# Patient Record
Sex: FEMALE | Race: OTHER | Employment: UNEMPLOYED | URBAN - METROPOLITAN AREA
[De-identification: names, ages, dates, MRNs, and addresses within clinical notes are randomized per-mention and may not be internally consistent; named-entity substitution may affect disease eponyms.]

---

## 2017-01-23 RX ORDER — METOPROLOL TARTRATE 25 MG/1
TABLET, FILM COATED ORAL
Qty: 60 TAB | Refills: 1 | Status: SHIPPED | OUTPATIENT
Start: 2017-01-23 | End: 2017-01-25 | Stop reason: SDUPTHER

## 2017-01-25 ENCOUNTER — OFFICE VISIT (OUTPATIENT)
Dept: FAMILY MEDICINE CLINIC | Age: 52
End: 2017-01-25

## 2017-01-25 VITALS
BODY MASS INDEX: 23.49 KG/M2 | RESPIRATION RATE: 26 BRPM | WEIGHT: 155 LBS | SYSTOLIC BLOOD PRESSURE: 163 MMHG | DIASTOLIC BLOOD PRESSURE: 99 MMHG | HEART RATE: 76 BPM | TEMPERATURE: 98.8 F | HEIGHT: 68 IN | OXYGEN SATURATION: 98 %

## 2017-01-25 DIAGNOSIS — Z12.11 SCREEN FOR COLON CANCER: ICD-10-CM

## 2017-01-25 DIAGNOSIS — R45.89 DEPRESSED AFFECT: ICD-10-CM

## 2017-01-25 DIAGNOSIS — Z23 NEED FOR TDAP VACCINATION: ICD-10-CM

## 2017-01-25 DIAGNOSIS — Z00.00 HEALTHCARE MAINTENANCE: Primary | ICD-10-CM

## 2017-01-25 DIAGNOSIS — I10 ESSENTIAL HYPERTENSION: ICD-10-CM

## 2017-01-25 DIAGNOSIS — J47.1 BRONCHIECTASIS WITH ACUTE EXACERBATION (HCC): ICD-10-CM

## 2017-01-25 DIAGNOSIS — Z23 ENCOUNTER FOR IMMUNIZATION: ICD-10-CM

## 2017-01-25 DIAGNOSIS — R05.9 COUGH: ICD-10-CM

## 2017-01-25 LAB
ALBUMIN UR QL STRIP: 30 MG/L
CREATININE, URINE POC: 200 MG/DL
MICROALBUMIN/CREAT RATIO POC: <30 MG/G

## 2017-01-25 RX ORDER — FLUOXETINE 10 MG/1
10 CAPSULE ORAL DAILY
Qty: 30 CAP | Refills: 11 | Status: SHIPPED | OUTPATIENT
Start: 2017-01-25 | End: 2021-06-29

## 2017-01-25 RX ORDER — METOPROLOL TARTRATE 25 MG/1
TABLET, FILM COATED ORAL
Qty: 60 TAB | Refills: 11 | Status: SHIPPED | OUTPATIENT
Start: 2017-01-25 | End: 2021-06-29

## 2017-01-25 RX ORDER — AZITHROMYCIN 250 MG/1
TABLET, FILM COATED ORAL
Qty: 6 TAB | Refills: 1 | Status: SHIPPED | OUTPATIENT
Start: 2017-01-25 | End: 2017-01-30

## 2017-01-25 RX ORDER — LOSARTAN POTASSIUM 100 MG/1
100 TABLET ORAL DAILY
Qty: 30 TAB | Refills: 11 | Status: SHIPPED | OUTPATIENT
Start: 2017-01-25 | End: 2021-06-29

## 2017-01-25 NOTE — PROGRESS NOTES
Justus Garcia is a 46 y.o. female      Issues discussed today include:        Signs and symptoms:  Cough producing yellow sputum  Duration: one week  Context:  +exposures  Location:  Head and chest  Quality:  congestion  Severity:  Preventing rest  Timing:  now  Modifying factors:  No fevers      Data reviewed or ordered today:      WELLNESS     GYN:  Dr. Shayan Luis, she sees 2017  Mammogram:  2017  LMP:  now  last pap:      Hearing screen:   done  Vision screening:   done  Depression screening:   Done, PHQ 9 = 9, Rx given today  Fall assessment:   done    Colonoscopy:  needs  FOBT:  2017  TDAP:  2008, 2017  Pneumovax:  2017  PCV-13:  Consider 2018  Flu shot:  declined  Eye exam:  Consider   EK    FTF for DME:  done:  none  Advanced directive:  Full code  Specialists:  Mark Lai  CKD Peppiatt  GI Sarah  pulmahin Kenny  ENT Mehdi Miller    In general, I advise patients to be as active as possible. I believe exercise is the key to long life and good health. The current recommendation is for individuals to exercise for 150 minutes each week (in other words 30 minutes 5 days a week). Exercise should be vigorous enough to work up a sweat. These activities include brisk walking, running, tennis, swimming, weight-lifting, etc.     I usually tell folks that work is work and exercise is exercise. Each of these activities has a different goal.  Even though you may be active at work, it may not be aerobically adequate. So build dedicated exercise time into your weekly routine. If a patient drinks alcohol, I suggest that a male drink only 2 beers (or glasses of wine, or shots of liquor) in any 24 hour period ( and not daily). For females, the limits are one drink per 24 hours (and not daily). After these limits, the toxic effects of alcohol consumption start to manifest.     Avoid tobacco products.   I may provide separate information discussing specific smoking cessation instructions if needed. I suggest a wellness exam yearly during your birth month to update health maintenance issues such as fasting labs, EKGs and other studies, appropriate cancer screenings,  immunizations, etc.      I suggest a yearly flu shot    Please call Leon Ross to help arrange and authorize any tests or referrals. 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 30 Cap 11    metoprolol tartrate (LOPRESSOR) 25 mg tablet TAKE 1 TABLET BY MOUTH TWICE A DAY 60 Tab 11    losartan (COZAAR) 100 mg tablet Take 1 Tab by mouth daily. 30 Tab 11    azithromycin (ZITHROMAX) 250 mg tablet Take 2 tablets today, then take 1 tablet daily 6 Tab 1    ascorbic acid (VITAMIN C) 500 mg tablet Take  by mouth.  fluticasone (FLONASE) 50 mcg/actuation nasal spray 2 puffs each nostril daily 1 Bottle 11    ASMANEX TWISTHALER 220 mcg (120 doses) aepb inhaler INHALE 2 PUFFS BY MOUTH TWICE DAILY 1 Inhaler 11    fexofenadine (ALLEGRA) 180 mg tablet Take  by mouth daily.  aspirin delayed-release 81 mg tablet Take  by mouth daily.  CALCIUM CARBONATE/VITAMIN D2 (CALCIUM + VITAMIN D PO) Take  by mouth.  potassium chloride (K-DUR, KLOR-CON) 10 mEq tablet Take 1 Tab by mouth daily. 30 Tab 3    FOLIC ACID/MULTIVIT-MIN/LUTEIN (CENTRUM SILVER PO) Take  by mouth. Allergies: Allergies   Allergen Reactions    Codeine Rash and Itching       LMP:  No LMP recorded.     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 Physical Exam  Visit Vitals    BP (!) 163/99    Pulse 76    Temp 98.8 °F (37.1 °C) (Oral)    Resp 26    Ht 5' 8\" (1.727 m)    Wt 155 lb (70.3 kg)    SpO2 98%    BMI 23.57 kg/m2     BP Readings from Last 3 Encounters:   01/25/17 (!) 163/99   04/07/16 178/87   03/20/16 (!) 143/93     Constitutional:  Appears well,  No Acute Distress, Vitals noted  Psychiatric:   Affect normal, Alert and cooperative, Oriented to person/place/time    Eyes:   Pupils equally round and reactive, EOMI, conjunctiva clear, eyelids normal  ENT:   External ears and nose normal/lips, teeth=OK/gums normal, TMs and Orophyarynx normal  Neck:   general inspection and Thyroid normal.  No abnormal cervical or supraclavicular nodes    Lungs:   Scattered rhonchi to auscultation, good respiratory effort  Heart: Ausculation normal.  Regular rhythm. No cardiac murmurs.   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 Healthcare maintenance Z00.00 V70.0    2. Depressed affect R45.89 311 FLUoxetine (PROZAC) 10 mg capsule      TSH 3RD GENERATION   3. Essential hypertension I10 401.9 metoprolol tartrate (LOPRESSOR) 25 mg tablet      losartan (COZAAR) 100 mg tablet      CHOLESTEROL, HDL      CHOLESTEROL, TOTAL      LDL, DIRECT      CBC W/O DIFF      METABOLIC PANEL, COMPREHENSIVE      AMB POC URINE, MICROALBUMIN, SEMIQUANT (3 RESULTS)    not optimal, ajdust Rx   4. Bronchiectasis with acute exacerbation (HCC) J47.1 494.1 azithromycin (ZITHROMAX) 250 mg tablet   5. Need for Tdap vaccination Z23 V06.1 TETANUS, DIPHTHERIA TOXOIDS AND ACELLULAR PERTUSSIS VACCINE (TDAP), IN INDIVIDS. >=7, IM   6. Encounter for immunization Z23 V03.89 PNEUMOCOCCAL POLYSACCHARIDE VACCINE, 23-VALENT, ADULT OR IMMUNOSUPPRESSED PT DOSE,   7. Screen for colon cancer Z12.11 V76.51 REFERRAL TO GASTROENTEROLOGY      AMB POC FECAL BLOOD, OCCULT, QL 3 CARDS       Plan:  Orders Placed This Encounter    PNEUMOCOCCAL POLYSACCHARIDE VACCINE, 23-VALENT, ADULT OR IMMUNOSUPPRESSED PT DOSE,    TETANUS, DIPHTHERIA TOXOIDS AND ACELLULAR PERTUSSIS VACCINE (TDAP), IN INDIVIDS. >=7, IM    CHOLESTEROL, HDL    CHOLESTEROL, TOTAL    LDL, DIRECT    TSH 3RD GENERATION    CBC W/O DIFF    METABOLIC PANEL, COMPREHENSIVE    REFERRAL TO GASTROENTEROLOGY     Referral Priority:   Routine     Referral Type:   Consultation     Referral Reason:   Specialty Services Required     Referral Location:   [facility]     Referred to Provider:   Lisset Dia MD    AMB POC URINE, MICROALBUMIN, SEMIQUANT (3 RESULTS)    AMB FECAL OCCULT BLOOD QL-3 CARDS    FLUoxetine (PROZAC) 10 mg capsule     Sig: Take 1 Cap by mouth daily.      Dispense:  30 Cap     Refill:  11    metoprolol tartrate (LOPRESSOR) 25 mg tablet     Sig: TAKE 1 TABLET BY MOUTH TWICE A DAY     Dispense:  60 Tab     Refill:  11    losartan (COZAAR) 100 mg tablet     Sig: Take 1 Tab by mouth daily. Dispense:  30 Tab     Refill:  11    azithromycin (ZITHROMAX) 250 mg tablet     Sig: Take 2 tablets today, then take 1 tablet daily     Dispense:  6 Tab     Refill:  1       See patient instructions  Patient Instructions   Start fluoxetine 10 mg daily    Follow up in 2 weeks    Continue other medicines    Take zithromax with food    Use mucinex for cough    Colonoscopy Dr Gabriela Fair #[phone]    Please call Shelly Roach to help arrange and authorize any tests and/or referrals.   Her # [phone]     Follow up for GYN exam and mammogram soon          refresh note:  done    AVS Printed:  done

## 2017-01-25 NOTE — PATIENT INSTRUCTIONS
Start fluoxetine 10 mg daily    Follow up in 2 weeks    Continue other medicines    Take zithromax with food    Use mucinex for cough    Colonoscopy Dr Bette Brush #[phone]    Please call Rosita Gaucher to help arrange and authorize any tests and/or referrals.   Her # is [phone]     Follow up for GYN exam and mammogram soon    Follow up with your specialists

## 2017-01-25 NOTE — MR AVS SNAPSHOT
Visit Information Date & Time Provider Department Dept. Phone Encounter #  
 1/25/2017  1:00 PM Kacey Galindo MD [facility] [phone] [id number] Upcoming Health Maintenance Date Due FOBT Q 1 YEAR AGE 50-75 1/25/2018 BREAST CANCER SCRN MAMMOGRAM 1/25/2019 PAP AKA CERVICAL CYTOLOGY 1/25/2020 DTaP/Tdap/Td series (2 - Td) 1/25/2027 Allergies as of 1/25/2017  Review Complete On: 1/25/2017 By: Kacey Galindo MD  
  
 Severity Noted Reaction Type Reactions Codeine High 04/06/2010    Rash, Itching Current Immunizations  Reviewed on 1/25/2017 Name Date Influenza Vaccine 9/25/2014, 11/18/2013 Influenza Vaccine Whole 10/17/2008 Pneumococcal Polysaccharide (PPSV-23)  Incomplete Pneumococcal Vaccine (Unspecified Type) 11/2/2006 TD Vaccine 10/17/2008 Tdap  Incomplete Reviewed by Niharika Osborne on 1/25/2017 at  1:06 PM  
 Reviewed by Niharika Osborne on 1/25/2017 at  1:06 PM  
You Were Diagnosed With   
  
 Codes Comments Healthcare maintenance    -  Primary ICD-10-CM: Z00.00 ICD-9-CM: V70.0 Depressed affect     ICD-10-CM: R45.89 ICD-9-CM: 879 Essential hypertension     ICD-10-CM: I10 
ICD-9-CM: 401.9 not optimal, ajdust Rx [facility]asis with acute exacerbation [facility]     ICD-10-CM: J47.1 ICD-9-CM: 494.1 Need for Tdap vaccination     ICD-10-CM: F40 ICD-9-CM: V06.1 Encounter for immunization     ICD-10-CM: G25 ICD-9-CM: V03.89 Screen for colon cancer     ICD-10-CM: Z12.11 ICD-9-CM: V76.51 Vitals BP Pulse Temp Resp Height(growth percentile) Weight(growth percentile) (!) 163/99 76 98.8 °F (37.1 °C) (Oral) 26 5' 8\" (1.727 m) 155 lb (70.3 kg) SpO2 BMI OB Status Smoking Status 98% 23.57 kg/m2 Having regular periods Never Smoker BMI and BSA Data Body Mass Index Body Surface Area  
 23.57 kg/m 2 1.84 m 2 Preferred Pharmacy Pharmacy Name Phone [facility]/PHARMACY #[id number], [address] ([place] [address]) [phone] Your Updated Medication List  
  
   
This list is accurate as of: 1/25/17  1:57 PM.  Always use your most recent med list.  
  
  
  
  
 ascorbic acid (vitamin C) 500 mg tablet Commonly known as:  VITAMIN C Take  by mouth. ASMANEX TWISTHALER 220 mcg (120 doses) Aepb inhaler Generic drug:  mometasone INHALE 2 PUFFS BY MOUTH TWICE DAILY  
  
 aspirin delayed-release 81 mg tablet Take  by mouth daily. azithromycin 250 mg tablet Commonly known as:  Eward Bert Take 2 tablets today, then take 1 tablet daily CALCIUM + VITAMIN D PO Take  by mouth. CENTRUM SILVER PO Take  by mouth. fexofenadine 180 mg tablet Commonly known as:  Chary Record Take  by mouth daily. FLUoxetine 10 mg capsule Commonly known as:  PROzac Take 1 Cap by mouth daily. fluticasone 50 mcg/actuation nasal spray Commonly known as:  Byron Sacks 2 puffs each nostril daily  
  
 losartan 100 mg tablet Commonly known as:  COZAAR Take 1 Tab by mouth daily. metoprolol tartrate 25 mg tablet Commonly known as:  LOPRESSOR  
TAKE 1 TABLET BY MOUTH TWICE A DAY  
  
 potassium chloride 10 mEq tablet Commonly known as:  K-DUR, KLOR-CON Take 1 Tab by mouth daily. Prescriptions Sent to Pharmacy Refills FLUoxetine (PROZAC) 10 mg capsule 11 Sig: Take 1 Cap by mouth daily. Class: Normal  
 Pharmacy: [facility]/pharmacy #[id number]- [id number] [facility], [address] [facility] [address] ([place] [address]) Ph #: [phone] Route: Oral  
 metoprolol tartrate (LOPRESSOR) 25 mg tablet 11 Sig: TAKE 1 TABLET BY MOUTH TWICE A DAY Class: Normal  
 Pharmacy: [facility] Ph #: [phone]  
 losartan (COZAAR) 100 mg tablet 11 Sig: Take 1 Tab by mouth daily. Class: Normal  
 Pharmacy: [facility]/pharmacy #[id number]- [address], [address] ([place] [address]) Ph #: [phone] Route: Oral  
 azithromycin (ZITHROMAX) 250 mg tablet 1 Sig: Take 2 tablets today, then take 1 tablet daily Class: Normal  
 Pharmacy: [facility]/pharmacy #[id number]- [address], [address] ([place] [address]) Ph #: [phone] We Performed the Following AMB POC FECAL BLOOD, OCCULT, QL 3 CARDS [06568 CPT(R)] AMB POC URINE, MICROALBUMIN, SEMIQUANT (3 RESULTS) [63481 CPT(R)] CBC W/O DIFF [47095 CPT(R)] CHOLESTEROL, HDL [id number] CPT(R)] CHOLESTEROL, TOTAL [28051 CPT(R)] LDL, DIRECT [id number] CPT(R)] METABOLIC PANEL, COMPREHENSIVE [75544 CPT(R)] PNEUMOCOCCAL POLYSACCHARIDE VACCINE, 23-VALENT, ADULT OR IMMUNOSUPPRESSED PT DOSE, [42673 CPT(R)] REFERRAL TO GASTROENTEROLOGY [EFO96 Custom] Comments:  
 Please evaluate patient for colonoscopy. TETANUS, DIPHTHERIA TOXOIDS AND ACELLULAR PERTUSSIS VACCINE (TDAP), IN INDIVIDS. >=7, IM [id number] CPT(R)] TSH 3RD GENERATION [64405 CPT(R)] Referral Information Referral ID Referred By Referred To  
  
 [id number] [facility], [address] [place] [address]  [address] [place] [address] Visits Status Start Date End Date 1 New Request 1/25/17 1/25/18 If your referral has a status of pending review or denied, additional information will be sent to support the outcome of this decision. Patient Instructions Start fluoxetine 10 mg daily Follow up in 2 weeks Continue other medicines Take zithromax with food Use mucinex for cough Colonoscopy Dr Saldana See #[phone] Please call Sumanth Lomeli to help arrange and authorize any tests and/or referrals. Her # is [phone] Follow up for GYN exam and mammogram soon Introducing [facility]! Shaylee Vazquez introduces [employer] patient portal. Now you can access parts of your medical record, email your doctor's office, and request medication refills online. 1. In your internet browser, go to [email or web address] 2. Click on the First Time User? Click Here link in the Sign In box. You will see the New Member Sign Up page. 3. Enter your [employer] Access Code exactly as it appears below. You will not need to use this code after youve completed the sign-up process. If you do not sign up before the expiration date, you must request a new code. · [employer] Access Code: [email or web address] Expires: 4/25/2017  1:57 PM 
 
4. Enter the last four digits of your Social Security Number (xxxx) and Date of Birth (mm/dd/yyyy) as indicated and click Submit. You will be taken to the next sign-up page. 5. Create a [employer] ID. This will be your [employer] login ID and cannot be changed, so think of one that is secure and easy to remember. 6. Create a [employer] password. You can change your password at any time. 7. Enter your Password Reset Question and Answer. This can be used at a later time if you forget your password. 8. Enter your e-mail address. You will receive e-mail notification when new information is available in [address] 9. Click Sign Up. You can now view and download portions of your medical record. 10. Click the Download Summary menu link to download a portable copy of your medical information. If you have questions, please visit the Frequently Asked Questions section of the [employer] website. Remember, [employer] is NOT to be used for urgent needs. For medical emergencies, dial 911. Now available from your iPhone and Android! Please provide this summary of care documentation to your next provider. Your primary care clinician is listed as Tripp Lentz. If you have any questions after today's visit, please call [phone].

## 2017-01-25 NOTE — PROGRESS NOTES
Chief Complaint   Patient presents with    Complete Physical     is not fasting.  Nasal Congestion     sinus congestion for about a week. feels very sick. Declined flu shot. Reviewed record in preparation for visit and have obtained necessary documentation. 1. Have you been to the ER, urgent care clinic since your last visit? Hospitalized since your last visit? No    2. Have you seen or consulted any other health care providers outside of the [address] [place] [address] since your last visit? Include any pap smears or colon screening.  No

## 2017-01-25 NOTE — LETTER
1/26/2017 12:34 PM 
 
Ms. Princess Patel [address] [address] Dear Princess Patel: 
 
Please find your most recent results below. Resulted Orders CHOLESTEROL, HDL Result Value Ref Range HDL Cholesterol 65 >39 mg/dL Narrative Performed at:  [address]  [id number] : Maryann Hanson MD, Phone:  [phone] CHOLESTEROL, TOTAL Result Value Ref Range Cholesterol, total 159 100 - 199 mg/dL Narrative Performed at:  [address]  [id number] : Maryann Hanson MD, Phone:  [phone] LDL, DIRECT Result Value Ref Range LDL,Direct 79 0 - 99 mg/dL Narrative Performed at:  [address]  [id number] : Maryann Hanson MD, Phone:  [phone] TSH 3RD GENERATION Result Value Ref Range TSH 2.950 0.450 - 4.500 uIU/mL Narrative Performed at:  [address]  [id number] : Maryann Hanson MD, Phone:  [phone] CBC W/O DIFF Result Value Ref Range WBC 8.2 3.4 - 10.8 x10E3/uL  
 RBC 4.69 3.77 - 5.28 x10E6/uL HGB 14.0 11.1 - 15.9 g/dL HCT 41.6 34.0 - 46.6 % MCV 89 79 - 97 fL  
 MCH 29.9 26.6 - 33.0 pg  
 MCHC 33.7 31.5 - 35.7 g/dL  
 RDW 14.1 12.3 - 15.4 % PLATELET 110 475 - 208 x10E3/uL Narrative Performed at:  [address]  [id number] : Maryann Hanson MD, Phone:  [phone] METABOLIC PANEL, COMPREHENSIVE Result Value Ref Range Glucose 69 65 - 99 mg/dL BUN 9 6 - 24 mg/dL Creatinine 0.53 (L) 0.57 - 1.00 mg/dL GFR est non- >59 mL/min/1.73 GFR est  >59 mL/min/1.73  
 BUN/Creatinine ratio 17 9 - 23 Sodium 146 (H) 134 - 144 mmol/L Potassium 3.3 (L) 3.5 - 5.2 mmol/L  Chloride 102 96 - 106 mmol/L  
 CO2 25 18 - 29 mmol/L Calcium 9.6 8.7 - 10.2 mg/dL Protein, total 7.6 6.0 - 8.5 g/dL Albumin 4.3 3.5 - 5.5 g/dL GLOBULIN, TOTAL 3.3 1.5 - 4.5 g/dL A-G Ratio 1.3 1.1 - 2.5 Bilirubin, total 1.2 0.0 - 1.2 mg/dL Alk. phosphatase 79 39 - 117 IU/L  
 AST 24 0 - 40 IU/L  
 ALT 23 0 - 32 IU/L Narrative Performed at:  [address]  [id number] : Micih Gandara MD, Phone:  [phone] AMB POC URINE, MICROALBUMIN, SEMIQUANT (3 RESULTS) Result Value Ref Range ALBUMIN, URINE POC 30 Negative mg/L  
 CREATININE, URINE  mg/dL Microalbumin/creat ratio (POC) <30 mg/g Comment:  
   Normal  
CVD REPORT Result Value Ref Range INTERPRETATION Note Comment:  
   Supplement report is available. Narrative Performed at:  [facility] [place] [address] [place] [address]  [id number] : Consuelo Patel PhD, Phone:  [phone] RECOMMENDATIONS: 
 
Your potassium remains borderline low. Let's recheck your blood pressure when you return in 2 weeks. Sincerely, Orville Casanova MD

## 2017-01-26 LAB
ALBUMIN SERPL-MCNC: 4.3 G/DL (ref 3.5–5.5)
ALBUMIN/GLOB SERPL: 1.3 {RATIO} (ref 1.1–2.5)
ALP SERPL-CCNC: 79 IU/L (ref 39–117)
ALT SERPL-CCNC: 23 IU/L (ref 0–32)
AST SERPL-CCNC: 24 IU/L (ref 0–40)
BILIRUB SERPL-MCNC: 1.2 MG/DL (ref 0–1.2)
BUN SERPL-MCNC: 9 MG/DL (ref 6–24)
BUN/CREAT SERPL: 17 (ref 9–23)
CALCIUM SERPL-MCNC: 9.6 MG/DL (ref 8.7–10.2)
CHLORIDE SERPL-SCNC: 102 MMOL/L (ref 96–106)
CHOLEST SERPL-MCNC: 159 MG/DL (ref 100–199)
CO2 SERPL-SCNC: 25 MMOL/L (ref 18–29)
CREAT SERPL-MCNC: 0.53 MG/DL (ref 0.57–1)
ERYTHROCYTE [DISTWIDTH] IN BLOOD BY AUTOMATED COUNT: 14.1 % (ref 12.3–15.4)
GLOBULIN SER CALC-MCNC: 3.3 G/DL (ref 1.5–4.5)
GLUCOSE SERPL-MCNC: 69 MG/DL (ref 65–99)
HCT VFR BLD AUTO: 41.6 % (ref 34–46.6)
HDLC SERPL-MCNC: 65 MG/DL
HGB BLD-MCNC: 14 G/DL (ref 11.1–15.9)
INTERPRETATION, 910389: NORMAL
LDLC SERPL DIRECT ASSAY-MCNC: 79 MG/DL (ref 0–99)
MCH RBC QN AUTO: 29.9 PG (ref 26.6–33)
MCHC RBC AUTO-ENTMCNC: 33.7 G/DL (ref 31.5–35.7)
MCV RBC AUTO: 89 FL (ref 79–97)
PLATELET # BLD AUTO: 224 X10E3/UL (ref 150–379)
POTASSIUM SERPL-SCNC: 3.3 MMOL/L (ref 3.5–5.2)
PROT SERPL-MCNC: 7.6 G/DL (ref 6–8.5)
RBC # BLD AUTO: 4.69 X10E6/UL (ref 3.77–5.28)
SODIUM SERPL-SCNC: 146 MMOL/L (ref 134–144)
TSH SERPL DL<=0.005 MIU/L-ACNC: 2.95 UIU/ML (ref 0.45–4.5)
WBC # BLD AUTO: 8.2 X10E3/UL (ref 3.4–10.8)

## 2017-01-26 NOTE — PROGRESS NOTES
Your potassium remains borderline low. Let's recheck your blood pressure when you return in 2 weeks.

## 2017-02-16 ENCOUNTER — TELEPHONE (OUTPATIENT)
Dept: FAMILY MEDICINE CLINIC | Age: 52
End: 2017-02-16

## 2017-02-16 NOTE — TELEPHONE ENCOUNTER
Pieter Key MD 1 1      Diagnosis Information   Diagnosis   Z12.11 (ICD-10-CM) - Screen for colon cancer      Referral Notes   Type Date User   Provider Comments 01/25/2017  1:56 PM Starlene Osgood, MD      Summary   Provider Comments      Note   Note     Please evaluate patient for colonoscopy                     Dr. Erlinda Mullen/colonoscopy    appt is 3/17/17 at 11:00 am    Fax to [phone]     [phone]    thanks

## 2017-03-07 ENCOUNTER — TELEPHONE (OUTPATIENT)
Dept: FAMILY MEDICINE CLINIC | Age: 52
End: 2017-03-07

## 2017-03-07 ENCOUNTER — OFFICE VISIT (OUTPATIENT)
Dept: FAMILY MEDICINE CLINIC | Age: 52
End: 2017-03-07

## 2017-03-07 VITALS
SYSTOLIC BLOOD PRESSURE: 167 MMHG | HEART RATE: 71 BPM | HEIGHT: 68 IN | BODY MASS INDEX: 24.1 KG/M2 | OXYGEN SATURATION: 98 % | TEMPERATURE: 98.5 F | DIASTOLIC BLOOD PRESSURE: 78 MMHG | RESPIRATION RATE: 18 BRPM | WEIGHT: 159 LBS

## 2017-03-07 DIAGNOSIS — E87.6 HYPOKALEMIA: ICD-10-CM

## 2017-03-07 DIAGNOSIS — E55.9 VITAMIN D DEFICIENCY: ICD-10-CM

## 2017-03-07 DIAGNOSIS — Z91.09 ENVIRONMENTAL ALLERGIES: ICD-10-CM

## 2017-03-07 DIAGNOSIS — Z91.09 ENVIRONMENTAL ALLERGIES: Primary | ICD-10-CM

## 2017-03-07 DIAGNOSIS — Z13.31 SCREENING FOR DEPRESSION: ICD-10-CM

## 2017-03-07 DIAGNOSIS — I10 ESSENTIAL HYPERTENSION: Primary | ICD-10-CM

## 2017-03-07 DIAGNOSIS — R80.9 PROTEINURIA: ICD-10-CM

## 2017-03-07 RX ORDER — GLUCOSAMINE HCL 500 MG
TABLET ORAL
COMMUNITY
End: 2017-06-09

## 2017-03-07 RX ORDER — FLUTICASONE PROPIONATE 50 MCG
SPRAY, SUSPENSION (ML) NASAL
Qty: 1 BOTTLE | Refills: 11 | Status: SHIPPED | OUTPATIENT
Start: 2017-03-07 | End: 2017-03-07 | Stop reason: ALTCHOICE

## 2017-03-07 RX ORDER — AMLODIPINE BESYLATE 2.5 MG/1
2.5 TABLET ORAL DAILY
Qty: 30 TAB | Refills: 3 | Status: SHIPPED | OUTPATIENT
Start: 2017-03-07 | End: 2017-07-01 | Stop reason: SDUPTHER

## 2017-03-07 RX ORDER — TRIAMCINOLONE ACETONIDE 55 UG/1
2 SPRAY, METERED NASAL DAILY
Qty: 1 BOTTLE | Refills: 11 | Status: SHIPPED | OUTPATIENT
Start: 2017-03-07 | End: 2021-10-13 | Stop reason: SDUPTHER

## 2017-03-07 NOTE — PROGRESS NOTES
Cem Bosch is a 46 y.o. female      Issues discussed today include:    BP check still up    Will add norvasc 2.5 to losartan 100 and metoprolol 25 BID    She still needs to see Dr. José Luis Quintanilla reviewed or ordered today:  potassium    Other problems include:  Patient Active Problem List   Diagnosis Code    [place] syndrome E80.4    Hepatitis A B15.9    Chronic tension headaches G44.229    Insomnia G47.00    DDD (degenerative disc disease) [id number]    HTN (hypertension) I10    Bronchiectasis ([address] J47.9    Vertigo [address] [facility] care maintenance Z00.00    Hyperglycemia R73.9    Microalbuminuria R80.9       Medications:  Current Outpatient Prescriptions   Medication Sig Dispense Refill    Cholecalciferol, Vitamin D3, 3,000 unit tab Take  by mouth.  amLODIPine (NORVASC) 2.5 mg tablet Take 1 Tab by mouth daily. 30 Tab 3    fluticasone (FLONASE) 50 mcg/actuation nasal spray 2 puffs each nostril daily 1 Bottle 11    FLUoxetine (PROZAC) 10 mg capsule Take 1 Cap by mouth daily. 30 Cap 11    metoprolol tartrate (LOPRESSOR) 25 mg tablet TAKE 1 TABLET BY MOUTH TWICE A DAY 60 Tab 11    losartan (COZAAR) 100 mg tablet Take 1 Tab by mouth daily. 30 Tab 11    potassium chloride (K-DUR, KLOR-CON) 10 mEq tablet Take 1 Tab by mouth daily. 30 Tab 3    FOLIC ACID/MULTIVIT-MIN/LUTEIN (CENTRUM SILVER PO) Take  by mouth.  ascorbic acid (VITAMIN C) 500 mg tablet Take  by mouth.  ASMANEX TWISTHALER 220 mcg (120 doses) aepb inhaler INHALE 2 PUFFS BY MOUTH TWICE DAILY 1 Inhaler 11    fexofenadine (ALLEGRA) 180 mg tablet Take  by mouth daily.  aspirin delayed-release 81 mg tablet Take  by mouth daily.  CALCIUM CARBONATE/VITAMIN D2 (CALCIUM + VITAMIN D PO) Take  by mouth. Allergies: Allergies   Allergen Reactions    Codeine Rash and Itching       LMP:  Patient's last menstrual period was 03/07/2017.     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 Physical Exam  Visit Vitals    /78 (BP 1 Location: Left arm, BP Patient Position: Sitting)    Pulse 71    Temp 98.5 °F (36.9 °C) (Oral)    Resp 18    Ht 5' 8\" (1.727 m)    Wt 159 lb (72.1 kg)    LMP 03/07/2017    SpO2 98%    BMI 24.18 kg/m2     BP Readings from Last 3 Encounters:   03/07/17 167/78   01/25/17 (!) 163/99   04/07/16 178/87     Constitutional:  Appears well,  No Acute Distress, Vitals noted  Psychiatric:   Affect normal, Alert and cooperative, Oriented to person/place/time    Eyes:   Pupils equally round and reactive, EOMI, conjunctiva clear, eyelids normal  ENT:   External ears and nose normal/lips, teeth=OK/gums normal, TMs and Orophyarynx normal  Neck:   general inspection and Thyroid normal.  No abnormal cervical or supraclavicular nodes    Lungs:   clear to auscultation, good respiratory effort  Heart: Ausculation normal.  Regular rhythm. No cardiac murmurs.   No carotid bruits or palpable thrills  Chest wall normal  Abd:  benign  Extremities:   without edema, good peripheral pulses  Skin:   Warm to palpation, without rashes, bruising, or suspicious lesions           Assessment:    Patient Active Problem List   Diagnosis Code    [place] syndrome E80.4    Hepatitis A B15.9    Chronic tension headaches G44.229    Insomnia G47.00    DDD (degenerative disc disease) [id number]    HTN (hypertension) I10    Bronchiectasis (HCC) J47.9    Vertigo R42    Health care maintenance Z00.00    Hyperglycemia R73.9    Microalbuminuria R80.9       Today's diagnoses are:    ICD-10-CM ICD-9-CM    1. Essential hypertension I10 401.9 REFERRAL TO NEPHROLOGY    no controlled   2. Hypokalemia E87.6 276.8 POTASSIUM      amLODIPine (NORVASC) 2.5 mg tablet      REFERRAL TO NEPHROLOGY   3. Normal body mass index (BMI) Z78.9 V49.89    4. Proteinuria R80.9 791.0 REFERRAL TO NEPHROLOGY   5. Vitamin D deficiency E55.9 268.9 VITAMIN D, 25 HYDROXY   6. Environmental allergies Z91.09 V15.09 fluticasone (FLONASE) 50 mcg/actuation nasal spray       Plan:  Orders Placed This Encounter    POTASSIUM    VITAMIN D, 25 HYDROXY    REFERRAL TO NEPHROLOGY     Referral Priority:   Routine     Referral Type:   Consultation     Referral Reason:   Specialty Services Required     Referred to Provider:   Poonam Cramer MD    Cholecalciferol, Vitamin D3, 3,000 unit tab     Sig: Take  by mouth.  amLODIPine (NORVASC) 2.5 mg tablet     Sig: Take 1 Tab by mouth daily. Dispense:  30 Tab     Refill:  3    fluticasone (FLONASE) 50 mcg/actuation nasal spray     Si puffs each nostril daily     Dispense:  1 Bottle     Refill:  11       See patient instructions  Patient Instructions   Labs today    Add amlodipine 2.5 mg daily  to losartan and metoprolol    See Dr. Jennifer Gore #[phone]    Please call Yael to help arrange and authorize any tests and/or referrals.   Her # is [phone] at [facility] is #[phone]          refresh note:  done    AVS Printed:  done

## 2017-03-07 NOTE — LETTER
3/8/2017 11:48 AM 
 
Ms. Indio Funk1 [place] Feldman [facility] [place] [address] Dear Indio Soto: 
 
Please find your most recent results below. Resulted Orders POTASSIUM Result Value Ref Range Potassium 3.5 3.5 - 5.2 mmol/L Narrative Performed at:  [address]  [id number] : Harvinder Tapia MD, Phone:  [phone] VITAMIN D, 25 HYDROXY Result Value Ref Range VITAMIN D, 25-HYDROXY 30.7 30.0 - 100.0 ng/mL Comment:  
   Vitamin D deficiency has been defined by the [address] [place] [address] practice guideline as a 
level of serum 25-OH vitamin D less than 20 ng/mL (1,2). The Endocrine Society went on to further define vitamin D 
insufficiency as a level between 21 and 29 ng/mL (2). 1. IOM ([place] of Medicine). 2010. Dietary reference 
   intakes for calcium and D. [address] [place] [address]: The 
   [employer]. 2. Sophie MF, Travon NC, Thanh BRADY, et al. 
   Evaluation, treatment, and prevention of vitamin D 
   deficiency: an Endocrine Society clinical practice 
   guideline. JCEM. 2011 Jul; 96(7):1911-30. Narrative Performed at:  [address]  [id number] : Harvinder Tapia MD, Phone:  [phone] RECOMMENDATIONS: 
 
Labs normal  
 
 
Sincerely, Pierre Wade MD

## 2017-03-07 NOTE — MR AVS SNAPSHOT
Visit Information Date & Time Provider Department Dept. Phone Encounter #  
 3/7/2017  2:30 PM Tha Zhou MD [address] [facility] [phone] [id number] Upcoming Health Maintenance Date Due FOBT Q 1 YEAR AGE 50-75 1/25/2018 BREAST CANCER SCRN MAMMOGRAM 1/25/2019 PAP AKA CERVICAL CYTOLOGY 1/25/2020 DTaP/Tdap/Td series (2 - Td) 1/25/2027 Allergies as of 3/7/2017  Review Complete On: 3/7/2017 By: Tha Zhou MD  
  
 Severity Noted Reaction Type Reactions Codeine High 04/06/2010    Rash, Itching Current Immunizations  Reviewed on 1/25/2017 Name Date Influenza Vaccine 9/25/2014, 11/18/2013 Influenza Vaccine Whole 10/17/2008 Pneumococcal Polysaccharide (PPSV-23) 1/25/2017 Pneumococcal Vaccine (Unspecified Type) 11/2/2006 TD Vaccine 10/17/2008 Tdap 1/25/2017 Not reviewed this visit You Were Diagnosed With   
  
 Codes Comments Hypokalemia    -  Primary ICD-10-CM: E87.6 ICD-9-CM: 276.8 Essential hypertension     ICD-10-CM: I10 
ICD-9-CM: 401.9 Normal body mass index (BMI)     ICD-10-CM: Z78.9 ICD-9-CM: V49.89 Proteinuria     ICD-10-CM: R80.9 ICD-9-CM: 791.0 Vitals BP Pulse Temp Resp Height(growth percentile) Weight(growth percentile) 167/78 (BP 1 Location: Left arm, BP Patient Position: Sitting) 71 98.5 °F (36.9 °C) (Oral) 18 5' 8\" (1.727 m) 159 lb (72.1 kg) LMP SpO2 BMI OB Status Smoking Status 03/07/2017 98% 24.18 kg/m2 Having regular periods Never Smoker Vitals History BMI and BSA Data Body Mass Index Body Surface Area  
 24.18 kg/m 2 1.86 m 2 Preferred Pharmacy Pharmacy Name Phone [id number] [address] ([place] [address]) [phone] Your Updated Medication List  
  
   
This list is accurate as of: 3/7/17  3:06 PM.  Always use your most recent med list.  
  
  
  
  
 amLODIPine 2.5 mg tablet Commonly known as:  Dick Alstrom Take 1 Tab by mouth daily. ascorbic acid (vitamin C) 500 mg tablet Commonly known as:  VITAMIN C Take  by mouth. ASMANEX TWISTHALER 220 mcg (120 doses) Aepb inhaler Generic drug:  mometasone INHALE 2 PUFFS BY MOUTH TWICE DAILY  
  
 aspirin delayed-release 81 mg tablet Take  by mouth daily. CALCIUM + VITAMIN D PO Take  by mouth. CENTRUM SILVER PO Take  by mouth. Cholecalciferol (Vitamin D3) 3,000 unit Tab Take  by mouth. fexofenadine 180 mg tablet Commonly known as:  Anastacia Frankel Take  by mouth daily. FLUoxetine 10 mg capsule Commonly known as:  PROzac Take 1 Cap by mouth daily. fluticasone 50 mcg/actuation nasal spray Commonly known as:  Lindalee Winnebago 2 puffs each nostril daily  
  
 losartan 100 mg tablet Commonly known as:  COZAAR Take 1 Tab by mouth daily. metoprolol tartrate 25 mg tablet Commonly known as:  LOPRESSOR  
TAKE 1 TABLET BY MOUTH TWICE A DAY  
  
 potassium chloride 10 mEq tablet Commonly known as:  K-DUR, KLOR-CON Take 1 Tab by mouth daily. Prescriptions Sent to Pharmacy Refills  
 amLODIPine (NORVASC) 2.5 mg tablet 3 Sig: Take 1 Tab by mouth daily. Class: Normal  
 Pharmacy: [id number]- [address], [address] ([place] [address]) Ph #: [phone] Route: Oral  
  
We Performed the Following POTASSIUM [id number] CPT(R)] REFERRAL TO NEPHROLOGY [BAB06 Custom] Comments:  
 Please evaluate patient for proteinuria, hypokalemia, difficult to control HTN. Referral Information Referral ID Referred By Referred To  
  
 [id number] Petra Villafana MD   
   [address] [place] [address], [address] Phone: [phone] Fax: [phone] Visits Status Start Date End Date 1 New Request 3/7/17 3/7/18 If your referral has a status of pending review or denied, additional information will be sent to support the outcome of this decision. Patient Instructions Labs today Add amlodipine 2.5 mg daily  to losartan and metoprolol See Dr. Evonne Saravia #[phone] Please call Chidi Ho to help arrange and authorize any tests and/or referrals. Her # is [phone] Central Scheduling at [facility] is #[phone] Introducing \[facility] [place] [facility]\"" & [facility] SERVICES! [employer] introduces [employer]IQ patient portal. Now you can access parts of your medical record, email your doctor's office, and request medication refills online. 1. In your internet browser, go to [email or web address] 2. Click on the First Time User? Click Here link in the Sign In box. You will see the New Member Sign Up page. 3. Enter your CancerIQ Access Code exactly as it appears below. You will not need to use this code after youve completed the sign-up process. If you do not sign up before the expiration date, you must request a new code. · CancerIQ Access Code: [email or web address] Expires: 4/25/2017  1:57 PM 
 
4. Enter the last four digits of your Social Security Number (xxxx) and Date of Birth (mm/dd/yyyy) as indicated and click Submit. You will be taken to the next sign-up page. 5. Create a CancerIQ ID. This will be your CancerIQ login ID and cannot be changed, so think of one that is secure and easy to remember. 6. Create a CancerIQ password. You can change your password at any time. 7. Enter your Password Reset Question and Answer. This can be used at a later time if you forget your password. 8. Enter your e-mail address. You will receive e-mail notification when new information is available in [address] 9. Click Sign Up. You can now view and download portions of your medical record. 10. Click the Download Summary menu link to download a portable copy of your medical information. If you have questions, please visit the Frequently Asked Questions section of the [employer] website. Remember, [employer] is NOT to be used for urgent needs. For medical emergencies, dial 911. Now available from your iPhone and Android! Please provide this summary of care documentation to your next provider. Your primary care clinician is listed as Kaylee José. If you have any questions after today's visit, please call [phone].

## 2017-03-07 NOTE — PATIENT INSTRUCTIONS
Labs today    Add amlodipine 2.5 mg daily  to losartan and metoprolol    See Dr. Leda Love #[phone]    Please call Ashley Goode to help arrange and authorize any tests and/or referrals.   Her # is [phone] at [facility] is #[phone]

## 2017-03-08 LAB
25(OH)D3+25(OH)D2 SERPL-MCNC: 30.7 NG/ML (ref 30–100)
POTASSIUM SERPL-SCNC: 3.5 MMOL/L (ref 3.5–5.2)

## 2017-03-14 ENCOUNTER — TELEPHONE (OUTPATIENT)
Dept: FAMILY MEDICINE CLINIC | Age: 52
End: 2017-03-14

## 2017-03-14 NOTE — TELEPHONE ENCOUNTER
Per patient call,    appt is 4/18/17 at 10:00 am  Dr. Meli Smith/Kidney Specialist    Referral order on file, pt didn't have phone number for office        Also appt 4/21/17 at 11:15 am  Dr. Adarsh Teague/colonoscopy    Office ph [phone]    Referral order on file

## 2017-04-20 DIAGNOSIS — Z12.11 SCREEN FOR COLON CANCER: Primary | ICD-10-CM

## 2017-06-09 ENCOUNTER — HOSPITAL ENCOUNTER (OUTPATIENT)
Dept: INTERVENTIONAL RADIOLOGY/VASCULAR | Age: 52
Discharge: HOME | End: 2017-06-09
Attending: INTERNAL MEDICINE
Payer: COMMERCIAL

## 2017-06-09 VITALS
DIASTOLIC BLOOD PRESSURE: 88 MMHG | BODY MASS INDEX: 24.71 KG/M2 | HEIGHT: 68 IN | OXYGEN SATURATION: 99 % | SYSTOLIC BLOOD PRESSURE: 145 MMHG | HEART RATE: 64 BPM | WEIGHT: 163 LBS | TEMPERATURE: 98.5 F | RESPIRATION RATE: 16 BRPM

## 2017-06-09 DIAGNOSIS — E26.9 ALDOSTERONISM (HCC): ICD-10-CM

## 2017-06-09 PROCEDURE — 36500 INSERTION OF CATHETER VEIN: CPT

## 2017-06-09 PROCEDURE — 74011250636 HC RX REV CODE- 250/636: Performed by: RADIOLOGY

## 2017-06-09 PROCEDURE — 36415 COLL VENOUS BLD VENIPUNCTURE: CPT | Performed by: INTERNAL MEDICINE

## 2017-06-09 PROCEDURE — 77030014021 HC SYR ANGI FIX LOK MRTM -A

## 2017-06-09 PROCEDURE — 74011000250 HC RX REV CODE- 250: Performed by: RADIOLOGY

## 2017-06-09 PROCEDURE — C1887 CATHETER, GUIDING: HCPCS

## 2017-06-09 PROCEDURE — C1894 INTRO/SHEATH, NON-LASER: HCPCS

## 2017-06-09 PROCEDURE — C1769 GUIDE WIRE: HCPCS

## 2017-06-09 PROCEDURE — 82533 TOTAL CORTISOL: CPT | Performed by: INTERNAL MEDICINE

## 2017-06-09 PROCEDURE — 82088 ASSAY OF ALDOSTERONE: CPT | Performed by: INTERNAL MEDICINE

## 2017-06-09 PROCEDURE — 84244 ASSAY OF RENIN: CPT | Performed by: INTERNAL MEDICINE

## 2017-06-09 PROCEDURE — 77030016712 HC CATH ANGI DX SVU3 ANGI -B

## 2017-06-09 PROCEDURE — 75893 VENOUS SAMPLING BY CATHETER: CPT

## 2017-06-09 PROCEDURE — 77030021533 HC CATH ANGI DX PRFMA MRTM -A

## 2017-06-09 PROCEDURE — 99153 MOD SED SAME PHYS/QHP EA: CPT

## 2017-06-09 PROCEDURE — 74011636320 HC RX REV CODE- 636/320: Performed by: RADIOLOGY

## 2017-06-09 PROCEDURE — C1892 INTRO/SHEATH,FIXED,PEEL-AWAY: HCPCS

## 2017-06-09 RX ORDER — HEPARIN SODIUM 200 [USP'U]/100ML
200 INJECTION, SOLUTION INTRAVENOUS ONCE
Status: COMPLETED | OUTPATIENT
Start: 2017-06-09 | End: 2017-06-09

## 2017-06-09 RX ORDER — LIDOCAINE HYDROCHLORIDE 20 MG/ML
10 INJECTION, SOLUTION INFILTRATION; PERINEURAL ONCE
Status: DISPENSED | OUTPATIENT
Start: 2017-06-09 | End: 2017-06-09

## 2017-06-09 RX ORDER — SODIUM CHLORIDE 9 MG/ML
25 INJECTION, SOLUTION INTRAVENOUS CONTINUOUS
Status: DISCONTINUED | OUTPATIENT
Start: 2017-06-09 | End: 2017-06-09

## 2017-06-09 RX ORDER — LIDOCAINE HYDROCHLORIDE 20 MG/ML
20 INJECTION, SOLUTION INFILTRATION; PERINEURAL ONCE
Status: COMPLETED | OUTPATIENT
Start: 2017-06-09 | End: 2017-06-09

## 2017-06-09 RX ORDER — FENTANYL CITRATE 50 UG/ML
100 INJECTION, SOLUTION INTRAMUSCULAR; INTRAVENOUS
Status: DISCONTINUED | OUTPATIENT
Start: 2017-06-09 | End: 2017-06-09

## 2017-06-09 RX ORDER — MIDAZOLAM HYDROCHLORIDE 1 MG/ML
5 INJECTION, SOLUTION INTRAMUSCULAR; INTRAVENOUS
Status: DISCONTINUED | OUTPATIENT
Start: 2017-06-09 | End: 2017-06-09

## 2017-06-09 RX ADMIN — MIDAZOLAM HYDROCHLORIDE 2 MG: 1 INJECTION INTRAMUSCULAR; INTRAVENOUS at 11:52

## 2017-06-09 RX ADMIN — SODIUM CHLORIDE 25 ML/HR: 900 INJECTION, SOLUTION INTRAVENOUS at 10:35

## 2017-06-09 RX ADMIN — SODIUM BICARBONATE 4 ML: 0.2 INJECTION, SOLUTION INTRAVENOUS at 13:32

## 2017-06-09 RX ADMIN — FENTANYL CITRATE 25 MCG: 50 INJECTION, SOLUTION INTRAMUSCULAR; INTRAVENOUS at 11:58

## 2017-06-09 RX ADMIN — COSYNTROPIN: 0.25 INJECTION, POWDER, LYOPHILIZED, FOR SOLUTION INTRAMUSCULAR; INTRAVENOUS at 10:35

## 2017-06-09 RX ADMIN — MIDAZOLAM HYDROCHLORIDE 1 MG: 1 INJECTION INTRAMUSCULAR; INTRAVENOUS at 12:05

## 2017-06-09 RX ADMIN — MIDAZOLAM HYDROCHLORIDE 2 MG: 1 INJECTION INTRAMUSCULAR; INTRAVENOUS at 11:58

## 2017-06-09 RX ADMIN — FENTANYL CITRATE 25 MCG: 50 INJECTION, SOLUTION INTRAMUSCULAR; INTRAVENOUS at 12:15

## 2017-06-09 RX ADMIN — IOPAMIDOL 65 ML: 755 INJECTION, SOLUTION INTRAVENOUS at 13:32

## 2017-06-09 RX ADMIN — MIDAZOLAM HYDROCHLORIDE 1 MG: 1 INJECTION INTRAMUSCULAR; INTRAVENOUS at 12:20

## 2017-06-09 RX ADMIN — HEPARIN SODIUM IN SODIUM CHLORIDE 200 UNITS: 200 INJECTION INTRAVENOUS at 13:32

## 2017-06-09 RX ADMIN — LIDOCAINE HYDROCHLORIDE 400 MG: 20 INJECTION, SOLUTION INFILTRATION; PERINEURAL at 13:31

## 2017-06-09 NOTE — DISCHARGE INSTRUCTIONS
[address] Venogram Discharge Instructions    General Information:   A venogram is a procedure that allows the interventional radiologist to take pictures of the blood flow in the vascular system. A radiology contrast (or dye) is injected into the veins so that the condition of the veins and valves may be visualized. Home Care Instructions: You can resume your regular diet. Do not shower, bathe, swim, drink alcohol, or make any important legal decisions in the next 48 hours. Do not lift anything heavier than a gallon of milk for 5 days. If you take Glucophage (metformin) for diabetes, do not take it for the next 48 hours. If you were asked to hold any blood thinners prior to the procedure, you may restart that medicine the day after the procedure is completed. Recline your car seat for the ride home. Call If:   You should call your Physician and/or the Radiology Nurse if you have any signs of infection; fever, drainage, redness, and/or swelling. If the puncture site should ooze, please call. Also call if you have any pain, decreased sensation, numbness, tingling, swelling, or change in color to the affected extremity. SEEK IMMEDIATE MEDICAL CARE IF YOUR PUNCTURE SITE STARTS TO BLEED. APPLY ENOUGH FIRM PRESSURE TO THE SITE WITH THE TIPS OF YOUR FINGERS TO STOP THE BLEEDING. Follow-Up Instructions:  Please see your ordering doctor as he/she has requested.     To Reach Us: [phone]    Patient Signature:  Date: 6/9/2017  Discharging Nurse: Constantin Painting RN

## 2017-06-09 NOTE — IP AVS SNAPSHOT
[address] [facility] 
[phone] Patient: Saqib Hill MRN: [id number] :1965 You are allergic to the following Allergen Reactions Codeine Rash Itching Recent Documentation Height Weight Breastfeeding? BMI OB Status Smoking Status 1.727 m 73.9 kg No 24.78 kg/m2 Having regular periods Never Smoker Unresulted Labs Order Current Status ALDOSTERONE In process ALDOSTERONE In process ALDOSTERONE In process ALDOSTERONE In process ALDOSTERONE In process CORTISOL In process CORTISOL In process CORTISOL In process CORTISOL In process RENIN ACTIVITY In process RENIN ACTIVITY In process RENIN ACTIVITY In process RENIN ACTIVITY In process RENIN ACTIVITY In process Emergency Contacts Name Discharge Info Relation Home Work Mobile Marge Fierro   [phone] About your hospitalization You were admitted on:  2017 You last received care in the:  [facility] RAD ANGIO IR You were discharged on:  2017 Unit phone number:  [phone] Why you were hospitalized Your primary diagnosis was:  Not on File Providers Seen During Your Hospitalizations Provider Role Specialty Primary office phone Jose Kee MD Attending Provider Nephrology [phone] Your Primary Care Physician (PCP) Primary Care Physician Office Phone Office Fax [facility] [phone] Follow-up Information Follow up With Details Comments Contact Info MD [phone] [address] 
[phone] Current Discharge Medication List  
  
CONTINUE these medications which have NOT CHANGED Dose & Instructions Dispensing Information Comments Morning Noon Evening Bedtime  
 amLODIPine 2.5 mg tablet Commonly known as:  Claudell Hesselbach Your last dose was: Your next dose is:    
   
   
 Dose:  2.5 mg Take 1 Tab by mouth daily. Quantity:  30 Tab Refills:  3  
     
   
   
   
  
 ascorbic acid (vitamin C) 500 mg tablet Commonly known as:  VITAMIN C Your last dose was: Your next dose is: Take  by mouth. Refills:  0 ASMANEX TWISTHALER 220 mcg (120 doses) Aepb inhaler Generic drug:  mometasone Your last dose was: Your next dose is:    
   
   
 INHALE 2 PUFFS BY MOUTH TWICE DAILY Quantity:  1 Inhaler Refills:  11  
     
   
   
   
  
 aspirin delayed-release 81 mg tablet Your last dose was: Your next dose is: Take  by mouth daily. Refills:  0  
     
   
   
   
  
 CALCIUM + VITAMIN D PO Your last dose was: Your next dose is: Take  by mouth. Refills:  0 CENTRUM SILVER PO Your last dose was: Your next dose is: Take  by mouth. Refills:  0  
     
   
   
   
  
 fexofenadine 180 mg tablet Commonly known as:  Jeanmarie Harris Your last dose was: Your next dose is: Take  by mouth daily. Refills:  0 FLUoxetine 10 mg capsule Commonly known as:  PROzac Your last dose was: Your next dose is:    
   
   
 Dose:  10 mg Take 1 Cap by mouth daily. Quantity:  30 Cap Refills:  11  
     
   
   
   
  
 losartan 100 mg tablet Commonly known as:  COZAAR Your last dose was: Your next dose is:    
   
   
 Dose:  100 mg Take 1 Tab by mouth daily. Quantity:  30 Tab Refills:  11  
     
   
   
   
  
 metoprolol tartrate 25 mg tablet Commonly known as:  LOPRESSOR Your last dose was: Your next dose is: TAKE 1 TABLET BY MOUTH TWICE A DAY Quantity:  60 Tab Refills:  11  
     
   
   
   
  
 potassium chloride 10 mEq tablet Commonly known as:  KLOR-CON Your last dose was: Your next dose is:    
   
   
 Dose:  10 mEq Take 1 Tab by mouth daily. Quantity:  30 Tab Refills:  3  
     
   
   
   
  
 triamcinolone 55 mcg nasal inhaler Commonly known as:  NASACORT Your last dose was: Your next dose is:    
   
   
 Dose:  2 Spray 2 Sprays by Both Nostrils route daily. Quantity:  1 Bottle Refills:  11 Discharge Instructions [address] Venogram Discharge Instructions General Information: A venogram is a procedure that allows the interventional radiologist to take pictures of the blood flow in the vascular system. A radiology contrast (or dye) is injected into the veins so that the condition of the veins and valves may be visualized. Home Care Instructions: You can resume your regular diet. Do not shower, bathe, swim, drink alcohol, or make any important legal decisions in the next 48 hours. Do not lift anything heavier than a gallon of milk for 5 days. If you take Glucophage (metformin) for diabetes, do not take it for the next 48 hours. If you were asked to hold any blood thinners prior to the procedure, you may restart that medicine the day after the procedure is completed. Recline your car seat for the ride home. Call If: 
 You should call your Physician and/or the Radiology Nurse if you have any signs of infection; fever, drainage, redness, and/or swelling. If the puncture site should ooze, please call. Also call if you have any pain, decreased sensation, numbness, tingling, swelling, or change in color to the affected extremity. SEEK IMMEDIATE MEDICAL CARE IF YOUR PUNCTURE SITE STARTS TO BLEED. APPLY ENOUGH FIRM PRESSURE TO THE SITE WITH THE TIPS OF YOUR FINGERS TO STOP THE BLEEDING. Follow-Up Instructions:  Please see your ordering doctor as he/she has requested. To Reach Us: [phone] Patient Signature: 
Date: 6/9/2017 Discharging Nurse: Eliane Marin RN Discharge Orders None Introducing [facility] & HEALTH SERVICES! [employer] introduces [employer]mx patient portal. Now you can access parts of your medical record, email your doctor's office, and request medication refills online. 1. In your internet browser, go to [email or web address] 2. Click on the First Time User? Click Here link in the Sign In box. You will see the New Member Sign Up page. 3. Enter your Semmx Access Code exactly as it appears below. You will not need to use this code after youve completed the sign-up process. If you do not sign up before the expiration date, you must request a new code. · Semmx Access Code: [email or web address] Expires: 9/7/2017  9:14 AM 
 
4. Enter the last four digits of your Social Security Number (xxxx) and Date of Birth (mm/dd/yyyy) as indicated and click Submit. You will be taken to the next sign-up page. 5. Create a Semmx ID. This will be your Semmx login ID and cannot be changed, so think of one that is secure and easy to remember. 6. Create a Semmx password. You can change your password at any time. 7. Enter your Password Reset Question and Answer. This can be used at a later time if you forget your password. 8. Enter your e-mail address. You will receive e-mail notification when new information is available in [address] 9. Click Sign Up. You can now view and download portions of your medical record. 10. Click the Download Summary menu link to download a portable copy of your medical information. If you have questions, please visit the Frequently Asked Questions section of the Semmx website. Remember, Semmx is NOT to be used for urgent needs. For medical emergencies, dial 911. Now available from your iPhone and Android! General Information Please provide this summary of care documentation to your next provider. Patient Signature:  ____________________________________________________________ Date:  ____________________________________________________________  
  
Fahad Isa Provider Signature:  ____________________________________________________________ Date:  ____________________________________________________________

## 2017-06-09 NOTE — ROUTINE PROCESS
Call placed to Lab at [facility] in regards to labeling of venous samples, spoke with [facility] and she advised how to label and order samples appropriately. Labs placed for venous samples at this time.

## 2017-06-09 NOTE — H&P
Radiology History and Physical    Patient: Saqib Hill 46 y.o. female     Chief Complaint: No chief complaint on file. History of Present Illness: Hypertension. 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  triamcinolone (NASACORT) 55 mcg nasal inhaler 2 Sprays by Both Nostrils route daily.  FLUoxetine (PROZAC) 10 mg capsule Take 1 Cap by mouth daily.  metoprolol tartrate (LOPRESSOR) 25 mg tablet TAKE 1 TABLET BY MOUTH TWICE A DAY    losartan (COZAAR) 100 mg tablet Take 1 Tab by mouth daily.  potassium chloride (K-DUR, KLOR-CON) 10 mEq tablet Take 1 Tab by mouth daily.  FOLIC ACID/MULTIVIT-MIN/LUTEIN (CENTRUM SILVER PO) Take  by mouth.  ascorbic acid (VITAMIN C) 500 mg tablet Take  by mouth.  fexofenadine (ALLEGRA) 180 mg tablet Take  by mouth daily.  CALCIUM CARBONATE/VITAMIN D2 (CALCIUM + VITAMIN D PO) Take  by mouth.  ASMANEX TWISTHALER 220 mcg (120 doses) aepb inhaler INHALE 2 PUFFS BY MOUTH TWICE DAILY    aspirin delayed-release 81 mg tablet Take  by mouth daily.        Current Facility-Administered Medications   Medication Dose Route Frequency    iopamidol (ISOVUE-370) 76 % injection 50 mL  50 mL IntraVENous ONCE    lidocaine (XYLOCAINE) 20 mg/mL (2 %) injection 200 mg  10 mL SubCUTAneous ONCE    sodium bicarbonate (NEUT) injection 2 mL  2 mL SubCUTAneous ONCE    heparinized saline 2 units/mL infusion 200 Units  200 Units IntraSHEAth ONCE    cosyntropin 250 mcg in 0.9% sodium chloride 250 mL IVPB   IntraVENous CONTINUOUS    0.9% sodium chloride infusion  25 mL/hr IntraVENous CONTINUOUS    fentaNYL citrate (PF) injection 100 mcg  100 mcg IntraVENous Multiple    midazolam (VERSED) injection 5 mg  5 mg IntraVENous Multiple    sodium bicarbonate (NEUT) injection 2 mL  2 mL SubCUTAneous ONCE    lidocaine (XYLOCAINE) 20 mg/mL (2 %) injection 400 mg  20 mL SubCUTAneous ONCE        Physical Exam:  Blood pressure 142/87, pulse 62, temperature 98.5 °F (36.9 °C), resp. rate 13, height 5' 8\" (1.727 m), weight 73.9 kg (163 lb), SpO2 100 %, not currently breastfeeding. GENERAL: alert, cooperative, no distress, appears stated age, LUNG: clear to auscultation bilaterally, HEART: regular rate and rhythm      Alerts:    Hospital Problems  Date Reviewed: 3/7/2017    None          Laboratory:    No results for input(s): HGB, HCT, WBC, PLT, INR, BUN, CREA, K, CRCLT, HGBEXT, HCTEXT, PLTEXT in the last 72 hours. No lab exists for component: PTT, PT, INREXT      Plan of Care/Planned Procedure:  Risks, benefits, and alternatives reviewed with patient and she agrees to proceed with the procedure.

## 2017-06-09 NOTE — IP AVS SNAPSHOT
Current Discharge Medication List  
  
CONTINUE these medications which have NOT CHANGED Dose & Instructions Dispensing Information Comments Morning Noon Evening Bedtime  
 amLODIPine 2.5 mg tablet Commonly known as:  Shawn Thakkar Your last dose was: Your next dose is:    
   
   
 Dose:  2.5 mg Take 1 Tab by mouth daily. Quantity:  30 Tab Refills:  3  
     
   
   
   
  
 ascorbic acid (vitamin C) 500 mg tablet Commonly known as:  VITAMIN C Your last dose was: Your next dose is: Take  by mouth. Refills:  0 ASMANEX TWISTHALER 220 mcg (120 doses) Aepb inhaler Generic drug:  mometasone Your last dose was: Your next dose is:    
   
   
 INHALE 2 PUFFS BY MOUTH TWICE DAILY Quantity:  1 Inhaler Refills:  11  
     
   
   
   
  
 aspirin delayed-release 81 mg tablet Your last dose was: Your next dose is: Take  by mouth daily. Refills:  0  
     
   
   
   
  
 CALCIUM + VITAMIN D PO Your last dose was: Your next dose is: Take  by mouth. Refills:  0 CENTRUM SILVER PO Your last dose was: Your next dose is: Take  by mouth. Refills:  0  
     
   
   
   
  
 fexofenadine 180 mg tablet Commonly known as:  Sarmad Augustin Your last dose was: Your next dose is: Take  by mouth daily. Refills:  0 FLUoxetine 10 mg capsule Commonly known as:  PROzac Your last dose was: Your next dose is:    
   
   
 Dose:  10 mg Take 1 Cap by mouth daily. Quantity:  30 Cap Refills:  11  
     
   
   
   
  
 losartan 100 mg tablet Commonly known as:  COZAAR Your last dose was: Your next dose is:    
   
   
 Dose:  100 mg Take 1 Tab by mouth daily. Quantity:  30 Tab Refills:  11  
     
   
   
   
  
 metoprolol tartrate 25 mg tablet Commonly known as:  LOPRESSOR Your last dose was: Your next dose is: TAKE 1 TABLET BY MOUTH TWICE A DAY Quantity:  60 Tab Refills:  11  
     
   
   
   
  
 potassium chloride 10 mEq tablet Commonly known as:  KLOR-CON Your last dose was: Your next dose is:    
   
   
 Dose:  10 mEq Take 1 Tab by mouth daily. Quantity:  30 Tab Refills:  3  
     
   
   
   
  
 triamcinolone 55 mcg nasal inhaler Commonly known as:  NASACORT Your last dose was: Your next dose is:    
   
   
 Dose:  2 Spray 2 Sprays by Both Nostrils route daily. Quantity:  1 Bottle Refills:  11

## 2017-06-09 NOTE — PROCEDURES
PROCEDURE:Adrenal vein sampling. INDICATION:hypertension. ANESTHESIA:sedation and local.  COMPLICATION:NONE. SPECIMENS REMOVED:blood samples for aldosterone and cortisol. BLOOD LOSS:NONE. /ASSISTANT:JAMES Arredondo RECOMMENDATIONS:none. CONSENT OBTAINED:YES.  NOTES:none.

## 2017-06-09 NOTE — ROUTINE PROCESS
0930- Pt in for adrenal vein sampling. Workup completed. Dtr with pt. Pharmacy called, spoke with Kleber Door related to cosyntropin administration. Per pharmacy no special monitoring related to medication is required. 80- Dr Brenda Childress in to discuss procedure with pt and dtr. Pt aware of risks and benefits and wishes to proceed. Consent obtained. 1300- Samples sent to lab as ordered. Pt in recovery, resting. VSS.    1400- Dr Brenda Childress in to speak with pt and dtr. Pt ambulated to BR. Voiding. Right groin WDL. Pedal pulses palpable. 1430- Discharge instructions reviewed with pt. Pt verbalized understanding. Discharged to home via w/c.

## 2017-06-10 LAB
CORTIS SERPL-MCNC: 33.6 UG/DL
CORTIS SERPL-MCNC: 35.9 UG/DL
CORTIS SERPL-MCNC: 36.3 UG/DL
CORTIS SERPL-MCNC: 37.4 UG/DL
CORTIS SERPL-MCNC: >150 UG/DL

## 2017-06-15 LAB
ALDOST SERPL-MCNC: 113.8 NG/DL (ref 0–30)
ALDOST SERPL-MCNC: 97.6 NG/DL (ref 0–30)

## 2017-06-16 LAB
RENIN PLAS-CCNC: <0.167 NG/ML/HR (ref 0.17–5.38)

## 2017-06-19 LAB
ALDOST SERPL-MCNC: 283.5 NG/DL (ref 0–30)
ALDOST SERPL-MCNC: 56.8 NG/DL (ref 0–30)
ALDOST SERPL-MCNC: 95.2 NG/DL (ref 0–30)

## 2017-07-01 DIAGNOSIS — E87.6 HYPOKALEMIA: ICD-10-CM

## 2017-07-01 RX ORDER — AMLODIPINE BESYLATE 2.5 MG/1
TABLET ORAL
Qty: 30 TAB | Refills: 3 | Status: SHIPPED | OUTPATIENT
Start: 2017-07-01 | End: 2022-05-09 | Stop reason: SDUPTHER

## 2021-06-21 ENCOUNTER — VIRTUAL VISIT (OUTPATIENT)
Dept: FAMILY MEDICINE CLINIC | Age: 56
End: 2021-06-21
Payer: MEDICARE

## 2021-06-21 DIAGNOSIS — M25.512 CHRONIC PAIN OF BOTH SHOULDERS: ICD-10-CM

## 2021-06-21 DIAGNOSIS — G89.29 CHRONIC PAIN OF BOTH SHOULDERS: ICD-10-CM

## 2021-06-21 DIAGNOSIS — H93.19 TINNITUS, UNSPECIFIED LATERALITY: ICD-10-CM

## 2021-06-21 DIAGNOSIS — R42 VERTIGO: Primary | ICD-10-CM

## 2021-06-21 DIAGNOSIS — M25.511 CHRONIC PAIN OF BOTH SHOULDERS: ICD-10-CM

## 2021-06-21 PROCEDURE — 99203 OFFICE O/P NEW LOW 30 MIN: CPT | Performed by: FAMILY MEDICINE

## 2021-06-21 NOTE — PROGRESS NOTES
Roslyn Doyle  54 y.o. female  [birth year] [address]  [id number]   Maurice Polanco MD       Encounter Date and Time: June 21, 2021 at 11:05 AM    Consent: Roslyn Doyle, who was seen by synchronous (real-time) audio-video technology, and/or her healthcare decision maker, is aware that this patient-initiated, Telehealth encounter on 6/21/2021 is a billable service, with coverage as determined by her insurance carrier. She is aware that she may receive a bill and has provided verbal consent to proceed: Yes. Chief Complaint   Patient presents with    Shoulder Pain    Dizziness     History of Present Illness   Roslyn Doyle is a 54 y.o. female was evaluated by synchronous (real-time) audio-video technology from home, through a secure patient portal.    Concerns for vertigo and dizziness. Started 2013 and went to the hospital for this. Also gets light headed at times with tinnitus. Recently returned from [facility] and saw some doctors here and also oversees. Tension in her shoulders. Has done PT and was not helpful. Worsened over 4 years. Muscles always feel tight and back. Has not tried dry needling. Review of Systems   ROS    Vitals/Objective:     General: alert, cooperative, no distress   Mental  status: mental status: alert, oriented to person, place, and time, normal mood, behavior, speech, dress, motor activity, and thought processes   Resp: resp: normal effort and no respiratory distress   Neuro: neuro: no gross deficits   Skin: skin: no discoloration or lesions of concern on visible areas   Due to this being a TeleHealth evaluation, many elements of the physical examination are unable to be assessed. Assessment and Plan:       1. Vertigo  -Refer to ENT. - REFERRAL TO ENT-OTOLARYNGOLOGY    2. Chronic pain of both shoulders  -Can follow up with Sports Medicine for shoulders    3.  Tinnitus, unspecified laterality  -Refer to ENT  - REFERRAL TO ENT-OTOLARYNGOLOGY    Time spent in direct conversation with the patient to include medical condition(s) discussed, assessment and treatment plan:       We discussed the expected course, resolution and complications of the diagnosis(es) in detail. Medication risks, benefits, costs, interactions, and alternatives were discussed as indicated. I advised her to contact the office if her condition worsens, changes or fails to improve as anticipated. She expressed understanding with the diagnosis(es) and plan. Patient understands that this encounter was a temporary measure, and the importance of further follow up and examination was emphasized. Patient verbalized understanding. Patient informed to follow up: Follow-up and Dispositions  ·   Return in about 2 weeks (around 7/5/2021) for Annual Physical with any provider and Sports Medicine at her convenience. Electronically Signed: Mani Everett MD    CPT Codes 06555-71301 for Established Patients may apply to this Telehealth Visit. POS code: 18. Modifier GT    Kostas Pagan is a 54 y.o. female who was evaluated by an audio-video encounter for concerns as above. Patient identification was verified prior to start of the visit. A caregiver was present when appropriate. Due to this being a TeleHealth encounter (During FZTLI-65 public health emergency), evaluation of the following organ systems was limited: Vitals/Constitutional/EENT/Resp/CV/GI//MS/Neuro/Skin/Heme-Lymph-Imm. Pursuant to the emergency declaration under the [facility], [facility] waiver authority and the [employer] and [employer] Act, this Virtual Visit was conducted, with patient's (and/or legal guardian's) consent, to reduce the patient's risk of exposure to COVID-19 and provide necessary medical care. Services were provided through a synchronous discussion virtually to substitute for in-person clinic visit.  I was at home. The patient was at home. History   Patients past medical, surgical and family histories were reviewed and updated. Past Medical History:   Diagnosis Date    Asthma     Bronchiectasis     Bruxism     Chronic bronchitis     Hypertension     Vertigo 3/2014     Past Surgical History:   Procedure Laterality Date    HX BACK SURGERY      HX  SECTION  200    x1 w/ twins    HX LOBECTOMY      RML     Family History   Problem Relation Age of Onset    Hypertension Mother     Glaucoma Sister     Hypertension Sister      Social History     Tobacco Use    Smoking status: Never Smoker    Smokeless tobacco: Never Used   Substance Use Topics    Alcohol use: No    Drug use: No     Patient Active Problem List   Diagnosis Code    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 1 Bottle 11    FLUoxetine (PROZAC) 10 mg capsule Take 1 Cap by mouth daily. 30 Cap 11    metoprolol tartrate (LOPRESSOR) 25 mg tablet TAKE 1 TABLET BY MOUTH TWICE A DAY 60 Tab 11    losartan (COZAAR) 100 mg tablet Take 1 Tab by mouth daily. 30 Tab 11    potassium chloride (K-DUR, KLOR-CON) 10 mEq tablet Take 1 Tab by mouth daily. 30 Tab 3    FOLIC ACID/MULTIVIT-MIN/LUTEIN (CENTRUM SILVER PO) Take  by mouth.  ascorbic acid (VITAMIN C) 500 mg tablet Take  by mouth.       ASMANEX TWISTHALER 220 mcg (120 doses) aepb inhaler INHALE 2 PUFFS BY MOUTH TWICE DAILY 1 Inhaler 11    fexofenadine (ALLEGRA) 180 mg tablet Take  by mouth daily.  aspirin delayed-release 81 mg tablet Take  by mouth daily.  CALCIUM CARBONATE/VITAMIN D2 (CALCIUM + VITAMIN D PO) Take  by mouth.        Allergies   Allergen Reactions    Codeine Rash and Itching

## 2021-06-29 ENCOUNTER — OFFICE VISIT (OUTPATIENT)
Dept: FAMILY MEDICINE CLINIC | Age: 56
End: 2021-06-29
Payer: MEDICAID

## 2021-06-29 VITALS
SYSTOLIC BLOOD PRESSURE: 135 MMHG | BODY MASS INDEX: 24.16 KG/M2 | TEMPERATURE: 98.6 F | HEART RATE: 78 BPM | RESPIRATION RATE: 16 BRPM | OXYGEN SATURATION: 98 % | WEIGHT: 159.4 LBS | HEIGHT: 68 IN | DIASTOLIC BLOOD PRESSURE: 81 MMHG

## 2021-06-29 DIAGNOSIS — J47.9 BRONCHIECTASIS WITHOUT COMPLICATION (HCC): Primary | ICD-10-CM

## 2021-06-29 DIAGNOSIS — M25.511 CHRONIC PAIN OF BOTH SHOULDERS: ICD-10-CM

## 2021-06-29 DIAGNOSIS — Z01.89 ENCOUNTER FOR BLOOD TEST: ICD-10-CM

## 2021-06-29 DIAGNOSIS — G89.29 CHRONIC PAIN OF BOTH SHOULDERS: ICD-10-CM

## 2021-06-29 DIAGNOSIS — M25.512 CHRONIC PAIN OF BOTH SHOULDERS: ICD-10-CM

## 2021-06-29 DIAGNOSIS — Z23 ENCOUNTER FOR IMMUNIZATION: ICD-10-CM

## 2021-06-29 PROCEDURE — 90471 IMMUNIZATION ADMIN: CPT | Performed by: STUDENT IN AN ORGANIZED HEALTH CARE EDUCATION/TRAINING PROGRAM

## 2021-06-29 PROCEDURE — 90750 HZV VACC RECOMBINANT IM: CPT | Performed by: STUDENT IN AN ORGANIZED HEALTH CARE EDUCATION/TRAINING PROGRAM

## 2021-06-29 PROCEDURE — 99214 OFFICE O/P EST MOD 30 MIN: CPT | Performed by: STUDENT IN AN ORGANIZED HEALTH CARE EDUCATION/TRAINING PROGRAM

## 2021-06-29 RX ORDER — LOSARTAN POTASSIUM 50 MG/1
50 TABLET ORAL DAILY
COMMUNITY
End: 2021-10-14 | Stop reason: SDUPTHER

## 2021-06-29 RX ORDER — METFORMIN HYDROCHLORIDE 850 MG/1
850 TABLET ORAL
COMMUNITY
End: 2022-01-26

## 2021-06-29 NOTE — PROGRESS NOTES
Chief Complaint   Patient presents with    Follow-up     Patient presents to for routine care; complaining of tension in neck & headaches. Visit Vitals  /81 (BP 1 Location: Left upper arm, BP Patient Position: Sitting, BP Cuff Size: Adult)   Pulse 78   Temp 98.6 °F (37 °C) (Oral)   Resp 16   Ht 5' 7.5\" (1.715 m)   Wt 159 lb 6.4 oz (72.3 kg)   SpO2 98%   BMI 24.60 kg/m²      1. Have you been to the ER, urgent care clinic since your last visit? Hospitalized since your last visit? No    2. Have you seen or consulted any other health care providers outside of the [address] [place] [address] since your last visit? Include any pap smears or colon screening.  No

## 2021-06-29 NOTE — PATIENT INSTRUCTIONS

## 2021-06-29 NOTE — PROGRESS NOTES
Mirna Keyes is an 54 y.o. female who presents for routine follow up. Pt recently returned from home [place] [facility] after been there for more than one year due to been unable to come back sooner due to [employer]. She mentioned has a long history of vertigo and tinnitus and is about to schedule appt to follow up with ENT (has been seen by Neurology and ENT in home country). Also, she mentioned having been diagnosed with Bronchiectasis in [facility] and would like to establish care with Pulmonologist here. Pt unable to provide details about it. States that she had Mammography done on 2/25/21 in [place] which showed benign bilateral cyst. Also PAP smear: ~ 1 year ago. It was normal.   Given that we do not that records I have recommended the pt to get these done within a year to make sure everything is normal.   Pt received full covid vaccine in [place], has records with her. Her complaint at this moment is chronic shoulder pain that has not improved with NSAIDs and PT. And would like to get blood work done as well. Denies fever, SOB, hip pain, CP, palpitations. Allergies - reviewed: Allergies   Allergen Reactions    Codeine Rash and Itching         Medications - reviewed:   Current Outpatient Medications   Medication Sig    losartan (COZAAR) 50 mg tablet Take 50 mg by mouth daily.  metFORMIN (GLUCOPHAGE) 850 mg tablet Take 850 mg by mouth once over twenty-four (24) hours.  amLODIPine (NORVASC) 2.5 mg tablet TAKE 1 TABLET BY MOUTH EVERY DAY (Patient taking differently: Take 5 mg by mouth daily.)    triamcinolone (NASACORT) 55 mcg nasal inhaler 2 Sprays by Both Nostrils route daily.  potassium chloride (K-DUR, KLOR-CON) 10 mEq tablet Take 1 Tab by mouth daily.  ascorbic acid (VITAMIN C) 500 mg tablet Take  by mouth.     ASMANEX TWISTHALER 220 mcg (120 doses) aepb inhaler INHALE 2 PUFFS BY MOUTH TWICE DAILY    aspirin delayed-release 81 mg tablet Take  by mouth daily.      FOLIC ACID/MULTIVIT-MIN/LUTEIN (CENTRUM SILVER PO) Take  by mouth. (Patient not taking: Reported on 2021)     No current facility-administered medications for this visit.          Past Medical History - reviewed:  Past Medical History:   Diagnosis Date    Asthma     Bilateral breast cysts 2021    Bronchiectasis     Bronchiectasis ([address] 2019    Bruxism     Chronic bronchitis     Hypertension     Presbyopia of both eyes 2019    Uterine myoma 2020    Vertigo 3/2014         Past Surgical History - reviewed:   Past Surgical History:   Procedure Laterality Date    HX BACK SURGERY      HX  SECTION  1990    x1 w/ twins    HX LOBECTOMY      RML         Social History - reviewed:  Social History     Socioeconomic History    Marital status:      Spouse name: Not on file    Number of children: 3    Years of education: Not on file    Highest education level: Not on file   Occupational History    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   Tobacco Use    Smoking status: Never Smoker    Smokeless tobacco: Never Used   Substance and Sexual Activity    Alcohol use: No    Drug use: No    Sexual activity: Yes     Partners: Male   Other Topics Concern     Service Not Asked    Blood Transfusions Not Asked    Caffeine Concern Not Asked    Occupational Exposure Not Asked    Hobby Hazards Not Asked    Sleep Concern Not Asked    Stress Concern Not Asked    Weight Concern Not Asked    Special Diet Not Asked    Back Care Not Asked    Exercise Yes     Comment: each day    Bike Helmet Not Asked    Seat Belt Not Asked    Self-Exams Not Asked   Social History Narrative    Not on file     Social Determinants of Health     Financial Resource Strain:     Difficulty of Paying Living Expenses:    Food Insecurity:     Worried About Running Out of Food in the Last Year:     Ran Out of Food in the Last Year:    Transportation Needs:     Lack of Transportation (Medical):  Lack of Transportation (Non-Medical):    Physical Activity:     Days of Exercise per Week:     Minutes of Exercise per Session:    Stress:     Feeling of Stress :    Social Connections:     Frequency of Communication with Friends and Family:     Frequency of Social Gatherings with Friends and Family:     Attends Jehovah's witness Services:     Active Member of Clubs or Organizations:     Attends Club or Organization Meetings:     Marital Status:    Intimate Partner Violence:     Fear of Current or Ex-Partner:     Emotionally Abused:     Physically Abused:     Sexually Abused:          Family History - reviewed:  Family History   Problem Relation Age of Onset    Hypertension Mother     Glaucoma Sister     Hypertension Sister          Immunizations - reviewed:   Immunization History   Administered Date(s) Administered    (RETIRED) Pneumococcal Vaccine (Unspecified Type) 11/02/2006    COVID-19, PFIZER, MRNA, LNP-S, PF, 30MCG/0.3ML DOSE 04/01/2021, 04/22/2021    Influenza Vaccine 11/18/2013, 09/25/2014    Influenza Vaccine Whole 10/17/2008    Pneumococcal Polysaccharide (PPSV-23) 01/25/2017    TD Vaccine 10/17/2008    Tdap 01/25/2017    Zoster Recombinant 06/29/2021       ROS  Negative beside what is written in HPI      Physical Exam  Visit Vitals  /81 (BP 1 Location: Left upper arm, BP Patient Position: Sitting, BP Cuff Size: Adult)   Pulse 78   Temp 98.6 °F (37 °C) (Oral)   Resp 16   Ht 5' 7.5\" (1.715 m)   Wt 159 lb 6.4 oz (72.3 kg)   SpO2 98%   BMI 24.60 kg/m²       General: No acute distress. Alert. Cooperative. Head: Normocephalic. Atraumatic. Throat: Mucosa pink. Moist mucous membranes. Shoulders: no deformities, muscle tension/spams noted in trapezius area, normal ROM, strength and sensitivity. Respiratory: CTAB. No w/r/r/c.  Cardiovascular: RRR. Normal S1,S2. No m/r/g.   GI: Nondistended.+ bowel sounds. Nontender. No rebound tenderness or guarding. Extremities: No LE edema. Distal pulses present. Musculoskeletal: Full ROM in all extremities. Skin: Warm, dry. No rashes. Neuro: Alert and oriented. Assessment/Plan    ICD-10-CM ICD-9-CM    1. Bronchiectasis without complication (HCC)  A37.0 494.0 REFERRAL TO PULMONARY DISEASE   2. Encounter for blood test  Z01.89 V72.60 CBC W/O DIFF      LIPID PANEL      HEMOGLOBIN A1C WITH EAG      METABOLIC PANEL, BASIC      MICROALBUMIN, UR, RAND W/ MICROALB/CREAT RATIO      METABOLIC PANEL, BASIC      HEMOGLOBIN A1C WITH EAG      LIPID PANEL      CBC W/O DIFF   3. Encounter for immunization  Z23 V03.89 ZOSTER VACC RECOMBINANT ADJUVANTED      ME IMMUNIZ ADMIN,1 SINGLE/COMB VAC/TOXOID    4. Shoulder Pain: will schedule an appt with SMT for eval and management. Follow-up and Dispositions    · Return in 1 year (on 6/29/2022), or if symptoms worsen or fail to improve. I have discussed the diagnosis with the patient and the intended plan as seen in the above orders. Patient verbalized understanding of the plan and agrees with the plan. The patient has received an after-visit summary and questions were answered concerning future plans. I have discussed medication side effects and warnings with the patient as well. Informed patient to return to the office if new symptoms arise. Pt discussed with dr. Cb Perez (Attending Physician).      Ny Bejarano MD  Family Medicine Resident

## 2021-06-30 LAB
ANION GAP SERPL CALC-SCNC: 6 MMOL/L (ref 5–15)
BUN SERPL-MCNC: 11 MG/DL (ref 6–20)
BUN/CREAT SERPL: 22 (ref 12–20)
CALCIUM SERPL-MCNC: 10.2 MG/DL (ref 8.5–10.1)
CHLORIDE SERPL-SCNC: 102 MMOL/L (ref 97–108)
CHOLEST SERPL-MCNC: 194 MG/DL
CO2 SERPL-SCNC: 34 MMOL/L (ref 21–32)
CREAT SERPL-MCNC: 0.49 MG/DL (ref 0.55–1.02)
CREAT UR-MCNC: 70.8 MG/DL
ERYTHROCYTE [DISTWIDTH] IN BLOOD BY AUTOMATED COUNT: 14.4 % (ref 11.5–14.5)
EST. AVERAGE GLUCOSE BLD GHB EST-MCNC: 103 MG/DL
GLUCOSE SERPL-MCNC: 93 MG/DL (ref 65–100)
HBA1C MFR BLD: 5.2 % (ref 4–5.6)
HCT VFR BLD AUTO: 42.9 % (ref 35–47)
HDLC SERPL-MCNC: 73 MG/DL
HDLC SERPL: 2.7 {RATIO} (ref 0–5)
HGB BLD-MCNC: 13.8 G/DL (ref 11.5–16)
LDLC SERPL CALC-MCNC: 97.8 MG/DL (ref 0–100)
MCH RBC QN AUTO: 29.5 PG (ref 26–34)
MCHC RBC AUTO-ENTMCNC: 32.2 G/DL (ref 30–36.5)
MCV RBC AUTO: 91.7 FL (ref 80–99)
MICROALBUMIN UR-MCNC: 1.39 MG/DL
MICROALBUMIN/CREAT UR-RTO: 20 MG/G (ref 0–30)
NRBC # BLD: 0 K/UL (ref 0–0.01)
NRBC BLD-RTO: 0 PER 100 WBC
PLATELET # BLD AUTO: 251 K/UL (ref 150–400)
PMV BLD AUTO: 11 FL (ref 8.9–12.9)
POTASSIUM SERPL-SCNC: 3.1 MMOL/L (ref 3.5–5.1)
RBC # BLD AUTO: 4.68 M/UL (ref 3.8–5.2)
SODIUM SERPL-SCNC: 142 MMOL/L (ref 136–145)
TRIGL SERPL-MCNC: 116 MG/DL (ref ?–150)
VLDLC SERPL CALC-MCNC: 23.2 MG/DL
WBC # BLD AUTO: 5.9 K/UL (ref 3.6–11)

## 2021-07-06 ENCOUNTER — PATIENT MESSAGE (OUTPATIENT)
Dept: FAMILY MEDICINE CLINIC | Age: 56
End: 2021-07-06

## 2021-07-06 PROBLEM — E87.6 HYPOKALEMIA: Status: ACTIVE | Noted: 2021-07-06

## 2021-07-06 RX ORDER — POTASSIUM CHLORIDE 20 MEQ/1
20 TABLET, EXTENDED RELEASE ORAL DAILY
Qty: 5 TABLET | Refills: 0 | Status: SHIPPED | OUTPATIENT
Start: 2021-07-06 | End: 2021-07-11

## 2021-07-06 NOTE — TELEPHONE ENCOUNTER
Good afternoon Mrs. June Chapa. Hope this message finds you well. I just want to inform you about your blood test results. Your kidney function is normal. Potassium level is slightly low, I will send a prescription to your pharmacy to treat it. Your hemoglobin and platelets level are normal. HgA1c and microalbumin levele is normal as well. Please let me know if your have any questions. Sincerely,     Dr. Tejas Ferro.

## 2021-07-08 ENCOUNTER — TRANSCRIBE ORDER (OUTPATIENT)
Dept: SCHEDULING | Age: 56
End: 2021-07-08

## 2021-07-08 DIAGNOSIS — J47.9 BRONCHIECTASIS (HCC): Primary | ICD-10-CM

## 2021-07-20 ENCOUNTER — OFFICE VISIT (OUTPATIENT)
Dept: FAMILY MEDICINE CLINIC | Age: 56
End: 2021-07-20

## 2021-07-20 VITALS
RESPIRATION RATE: 16 BRPM | WEIGHT: 157.4 LBS | TEMPERATURE: 98.3 F | SYSTOLIC BLOOD PRESSURE: 117 MMHG | HEIGHT: 68 IN | OXYGEN SATURATION: 98 % | DIASTOLIC BLOOD PRESSURE: 74 MMHG | BODY MASS INDEX: 23.86 KG/M2 | HEART RATE: 71 BPM

## 2021-07-20 DIAGNOSIS — M62.838 TRAPEZIUS MUSCLE SPASM: Primary | ICD-10-CM

## 2021-07-20 DIAGNOSIS — M54.2 CERVICALGIA: ICD-10-CM

## 2021-07-20 PROCEDURE — 99214 OFFICE O/P EST MOD 30 MIN: CPT | Performed by: FAMILY MEDICINE

## 2021-07-20 RX ORDER — MELATONIN
5000 DAILY
COMMUNITY

## 2021-07-20 NOTE — PROGRESS NOTES
[address] [facility]      Chief Complaint:   Chief Complaint   Patient presents with    Shoulder Pain     bilateral shoulder pain x 2-3 years. has a lot of knots along her shoulders. she goes to PT and gets massages but they come back within a few days. takes aleve for pain. rates pain 7/10 currently. describes it as pressure and she also has neck pain and a headache, along with ringing n her ears       Subjective: Zaire Robert is an 54 y.o. female who presents for evaluation and treatment of neck pain. Pt has been experiencing pain/pressure from her shoulders that radiates up in the neck and the back of the head. She notes that she gets ringing in the ears and dizziness when this gets worse. She has gone to PT over 3 times for 1.5 months at a time with some relief that day then it's back to baseline. She also reports massages that reduced pain significantly but that it is too expensive to keep up with. She has flares that result in increase in symptoms. Aggravating factors include driving, bending over, stress, and exercises. Also reports that she feels a knot just superior to her R knee that started two weeks ago. It is not tender nor bruised. No trauma or fall associated. Past Medical History:   Diagnosis Date    Asthma     Bilateral breast cysts 02/2021    Bronchiectasis     Bronchiectasis ([address] 12/2019    Bruxism     Chronic bronchitis     Hypertension     Presbyopia of both eyes 01/2019    Uterine myoma 12/2020    Vertigo 3/2014     Family History   Problem Relation Age of Onset    Hypertension Mother     Glaucoma Sister     Hypertension Sister      Current Outpatient Medications   Medication Sig Dispense Refill    cholecalciferol (Vitamin D3) (1000 Units /25 mcg) tablet Take 1,000 Units by mouth daily.  losartan (COZAAR) 50 mg tablet Take 50 mg by mouth daily.       metFORMIN (GLUCOPHAGE) 850 mg tablet Take 850 mg by mouth once over twenty-four (24) hours.  amLODIPine (NORVASC) 2.5 mg tablet TAKE 1 TABLET BY MOUTH EVERY DAY (Patient taking differently: Take 5 mg by mouth daily.) 30 Tab 3    triamcinolone (NASACORT) 55 mcg nasal inhaler 2 Sprays by Both Nostrils route daily. 1 Bottle 11    potassium chloride (K-DUR, KLOR-CON) 10 mEq tablet Take 1 Tab by mouth daily. 30 Tab 3    ascorbic acid (VITAMIN C) 500 mg tablet Take  by mouth.  ASMANEX TWISTHALER 220 mcg (120 doses) aepb inhaler INHALE 2 PUFFS BY MOUTH TWICE DAILY 1 Inhaler 11    FOLIC ACID/MULTIVIT-MIN/LUTEIN (CENTRUM SILVER PO) Take  by mouth. (Patient not taking: Reported on 7/20/2021)      aspirin delayed-release 81 mg tablet Take  by mouth daily.    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   Tobacco Use    Smoking status: Never Smoker    Smokeless tobacco: Never Used   Substance and Sexual Activity    Alcohol use: No    Drug use: No    Sexual activity: Yes     Partners: Male   Other Topics Concern     Service Not Asked    Blood Transfusions Not Asked    Caffeine Concern Not Asked    Occupational Exposure Not Asked    Hobby Hazards Not Asked    Sleep Concern Not Asked    Stress Concern Not Asked    Weight Concern Not Asked    Special Diet Not Asked    Back Care Not Asked    Exercise Yes     Comment: each day    Bike Helmet Not Asked    Seat Belt Not Asked    Self-Exams Not Asked   Social History Narrative    Not on file     Social Determinants of Health     Financial Resource Strain:     Difficulty of Paying Living Expenses:    Food Insecurity:     Worried About Running Out of [employer] in the Last Year:    [address] in the Last Year:    Transportation Needs:     Lack of Transportation (Medical):  Lack of Transportation (Non-Medical):    Physical Activity:     Days of Exercise per Week:     Minutes of Exercise per Session:    Stress:     Feeling of Stress :    Social Connections:     Frequency of Communication with Friends and Family:     Frequency of Social Gatherings with Friends and Family:     Attends Caodaism Services:     Active Member of Clubs or Organizations:     Attends Club or Organization Meetings:     Marital Status:    Intimate Partner Violence:     Fear of Current or Ex-Partner:     Emotionally Abused:     Physically Abused:     Sexually Abused:        Review of Systems: Per HPI    Objective:     Visit Vitals  /74 (BP 1 Location: Left upper arm, BP Patient Position: Sitting, BP Cuff Size: Adult)   Pulse 71   Temp 98.3 °F (36.8 °C) (Oral)   Resp 16   Ht 5' 7.5\" (1.715 m)   Wt 157 lb 6.4 oz (71.4 kg)   SpO2 98%   BMI 24.29 kg/m²     General: Alert and oriented and in no acute distress. Responds to all questions appropriately. SKIN: No rash. EYES: Sclera and conjunctiva clear. NEUROLOGIC: Speech intact; face symmetrical; moves all extremities equally. MSK:       Posture: Normal      Deformity: None       ROM - Cervical spine:        Flexion: Normal ROM, but with pain        Extension: Normal ROM, but with pain        Lateral bending: Reduced R sided ROM and with pain. Pain on L ROM.        Upper Ext: FROM bilaterally          Palpation:       C1  T1 tenderness: None       Trapezious tenderness: bilaterally, diffusely       Paraspinal tenderness: T1-4        Strength (0-5/5):       :   Left: 5/5  Right: 5/5       Wrist Extension:  Left: 5/5  Right: 5/5       Wrist Flexion:  Left: 5/5  Right: 5/5       Elbow Flextion: Left: 5/5  Right: 5/5       Elbow Extension:  Left: 5/5  Right: 5/5       Shoulder Flexion:  Left: 5/5  Right: 5/5       Shoulder Abduction:  Left: 5/5  Right: 5/5       Shouder Ext Rotation: Left: 5/5  Right: 5/5       Shoulder Int Rotation: Left: 5/5  Right: 5/5         Sensation: Intact, no deficits in the upper ext bilaterally. Special test:      Spurlings: Left: Negative  Right: Negative     EXTREMITIES: Well perfused. Moving all extremities equally. Assessment:       ICD-10-CM ICD-9-CM    1. Trapezius muscle spasm  M62.838 728.85 XR SPINE CERV PA LAT ODONT 3 V MAX   2. Cervicalgia  M54.2 723.1 XR SPINE CERV PA LAT ODONT 3 V MAX       Plan:   1. Home Exercise Program as per handout. 2. Ice 15 minutes, three times a day for 48 hours. 3. Discussed trigger point injection but she declined. She would like to proceed with HEP first.  If sx not improving, then she will consider trigger point injection. Medications:    1. Ibuprofen (Motrin, Advil):  200mg - take 1-4 tablets three times a day for 5 days. -OR-    Naproxin (Aleve): 220mg 1-2 tablets twice a day for 5 days   2. Acetaminophen (Tylenol):  500mg 1-2 tablets every 6 hours as needed for pain.     RTC: PRN

## 2021-07-20 NOTE — PROGRESS NOTES
Chief Complaint   Patient presents with    Shoulder Pain     bilateral shoulder pain x 2-3 years. has a lot of knots along her shoulders. she goes to PT and gets massages but they come back within a few days. takes aleve for pain. rates pain 7/10 currently. describes it as pressure and she also has neck pain and a headache, along with ringing n her ears     1. Have you been to the ER, urgent care clinic since your last visit? Hospitalized since your last visit? No    2. Have you seen or consulted any other health care providers outside of the [address] [place] [address] since your last visit? Include any pap smears or colon screening.  No

## 2021-07-28 ENCOUNTER — HOSPITAL ENCOUNTER (OUTPATIENT)
Dept: CT IMAGING | Age: 56
Discharge: HOME | End: 2021-07-28
Attending: INTERNAL MEDICINE
Payer: MEDICAID

## 2021-07-28 DIAGNOSIS — J47.9 BRONCHIECTASIS (HCC): ICD-10-CM

## 2021-07-28 PROCEDURE — 71250 CT THORAX DX C-: CPT

## 2021-08-20 ENCOUNTER — TELEPHONE (OUTPATIENT)
Dept: FAMILY MEDICINE CLINIC | Age: 56
End: 2021-08-20

## 2021-08-20 NOTE — TELEPHONE ENCOUNTER
Called patient using   #[phone] to clarify what medication is being requested. Left message requesting a callback.

## 2021-08-20 NOTE — TELEPHONE ENCOUNTER
----- Message from [place] sent at 8/20/2021 11:10 AM EDT -----  Regarding: Dr. Alex Crzu (if not patient): n/a      Relationship of caller (if not patient):  n/a       Best contact number(s):[phone]      Name of medication and dosage if known:  electrolyte      Is patient out of this medication (yes/no):  yes      Pharmacy name:  [address] listed in chart? (yes/no): yes  Pharmacy phone number:  [phone]      Details to clarify the request:  Eren Townsend [facility]

## 2021-08-25 ENCOUNTER — OFFICE VISIT (OUTPATIENT)
Dept: FAMILY MEDICINE CLINIC | Age: 56
End: 2021-08-25
Payer: MEDICAID

## 2021-08-25 VITALS
SYSTOLIC BLOOD PRESSURE: 124 MMHG | OXYGEN SATURATION: 96 % | TEMPERATURE: 97.3 F | RESPIRATION RATE: 18 BRPM | DIASTOLIC BLOOD PRESSURE: 81 MMHG | WEIGHT: 157 LBS | HEIGHT: 68 IN | BODY MASS INDEX: 23.79 KG/M2 | HEART RATE: 94 BPM

## 2021-08-25 DIAGNOSIS — G44.221 CHRONIC TENSION-TYPE HEADACHE, INTRACTABLE: Primary | ICD-10-CM

## 2021-08-25 DIAGNOSIS — M62.838 MUSCLE SPASM: ICD-10-CM

## 2021-08-25 DIAGNOSIS — F43.9 STRESS: ICD-10-CM

## 2021-08-25 DIAGNOSIS — G43.109 MIGRAINE WITH VERTIGO: ICD-10-CM

## 2021-08-25 DIAGNOSIS — R42 VERTIGO: ICD-10-CM

## 2021-08-25 PROCEDURE — 99214 OFFICE O/P EST MOD 30 MIN: CPT | Performed by: STUDENT IN AN ORGANIZED HEALTH CARE EDUCATION/TRAINING PROGRAM

## 2021-08-25 RX ORDER — CLARITHROMYCIN 250 MG/1
250 TABLET, FILM COATED ORAL 2 TIMES DAILY
COMMUNITY
End: 2022-01-26

## 2021-08-25 RX ORDER — AMITRIPTYLINE HYDROCHLORIDE 10 MG/1
20 TABLET, FILM COATED ORAL
Qty: 60 TABLET | Refills: 5 | Status: SHIPPED | OUTPATIENT
Start: 2021-08-25 | End: 2021-09-24

## 2021-08-25 RX ORDER — CYCLOBENZAPRINE HCL 5 MG
5 TABLET ORAL
Qty: 12 TABLET | Refills: 0 | Status: SHIPPED | OUTPATIENT
Start: 2021-08-25 | End: 2021-08-29

## 2021-08-25 RX ORDER — METOPROLOL TARTRATE 100 MG/1
25 TABLET ORAL DAILY
COMMUNITY
End: 2022-07-21

## 2021-08-25 RX ORDER — MINERAL OIL
180 ENEMA (ML) RECTAL DAILY
COMMUNITY
End: 2022-01-26

## 2021-08-25 RX ORDER — MONTELUKAST SODIUM 10 MG/1
10 TABLET ORAL DAILY
COMMUNITY
Start: 2021-08-09

## 2021-08-25 RX ORDER — MAGNESIUM GLUCONATE 27 MG(500)
TABLET ORAL 2 TIMES DAILY
COMMUNITY
End: 2022-01-26

## 2021-08-25 RX ORDER — FLUTICASONE PROPIONATE 50 MCG
2 SPRAY, SUSPENSION (ML) NASAL DAILY
COMMUNITY

## 2021-08-25 RX ORDER — AMITRIPTYLINE HYDROCHLORIDE 10 MG/1
20 TABLET, FILM COATED ORAL
COMMUNITY
End: 2021-08-25 | Stop reason: SDUPTHER

## 2021-08-25 RX ORDER — NAPROXEN 500 MG/1
500 TABLET ORAL 2 TIMES DAILY WITH MEALS
Qty: 28 TABLET | Refills: 0 | Status: SHIPPED | OUTPATIENT
Start: 2021-08-25 | End: 2021-09-08

## 2021-08-25 NOTE — PROGRESS NOTES
Regla Raphael is a 64 y.o. female    Chief Complaint   Patient presents with    Headache     has been having neck and shoulder pain as well as headaches. still has ringing in her ears. she also has motion sickness, dizziness, and lightheadedness. she is doing home exercises, which helps relieve her pain. takes aleve PRN       1. Have you been to the ER, urgent care clinic since your last visit? Hospitalized since your last visit? No    2. Have you seen or consulted any other health care providers outside of the [address] [place] [address] since your last visit? Include any pap smears or colon screening. No      Visit Vitals  /81 (BP 1 Location: Left upper arm, BP Patient Position: Sitting, BP Cuff Size: Adult)   Pulse 94   Temp 97.3 °F (36.3 °C) (Temporal)   Resp 18   Ht 5' 7.5\" (1.715 m)   Wt 157 lb (71.2 kg)   SpO2 96%   BMI 24.23 kg/m²           Health Maintenance Due   Topic Date Due    Colorectal Cancer Screening Combo  01/25/2018    Breast Cancer Screen Mammogram  02/09/2019    PAP AKA CERVICAL CYTOLOGY  01/25/2020    Shingrix Vaccine Age 50> (2 of 2) 08/24/2021         Medication Reconciliation completed, changes noted.   Please  Update medication list.

## 2021-08-25 NOTE — PROGRESS NOTES
Subjective  Yamini Yuan is an 64 y.o. female who presents for evaluation of headache. Patient has experiencing chronic headaches since 2014 (after her divorce). States her headaches are associated with neck and shoulder tenderness and tightness. Tenderness is worse in the occipital area and also in her temporal area. Stretching her shoulders, physical therapy, Tylenol and Aleve improves her HA. She states she wakes up with headaches and jaw pain. She has tried changing pillows but HA is still present. Ms. Naomi Gregory has a hx of chronic allergic sinusitis, tinnitus and vertigo for which she follows with ENT (Dr. See Wesley)    Ms. Naomi Gregory reports feeling very stress and states she doesn't know how to relax. She also reports daily teeth grinding and bruxism. Patient takes care of 6 grandchildren and is in plans of moving into her own place to be more calm. When HA are present she reports intensity ranging from 4-7/10 and associated with nausea. Positional changes like bending over can sometimes worse her headaches and she can sometimes feel pressure behind her eyes but denies pain with eye movements. Denies blurry vision, photophobia, phonophobia  Systemic symptoms including fever: no  Neoplasm history: no  Neurologic deficit (including decreased consciousness): no  Onset is sudden or abrupt: no  Precipitated by sneezing, coughing, or exercise: no  Progressive headache and atypical presentations: no  Painful eye with autonomic features: no  Post-traumatic onset of headache: no  Pathology of the immune system such as HIV: no  Painkiller (analgesic) overuse (eg, medication overuse headache) or new drug at onset of headache: no       Allergies - reviewed: Allergies   Allergen Reactions    Codeine Rash and Itching         Medications - reviewed:   Current Outpatient Medications   Medication Sig    levonorgestreL (KYLEENA) 17.5 mcg/24 hrs (5 yrs) 19.5 mg IUD IUD 1 Device by IntraUTERine route once.     montelukast (SINGULAIR) 10 mg tablet Take 10 mg by mouth daily.  potassium gluconate 600 mg (99 mg) tab Take  by mouth two (2) times a day.  fexofenadine (ALLEGRA) 180 mg tablet Take 180 mg by mouth daily.  clarithromycin (BIAXIN) 250 mg tablet Take 250 mg by mouth two (2) times a day. Twice every other day.  metoprolol tartrate (LOPRESSOR) 100 mg IR tablet Take 25 mg by mouth daily.  fluticasone propionate (FLONASE) 50 mcg/actuation nasal spray 2 Sprays by Both Nostrils route daily.  ipratropium (ATROVENT HFA) 17 mcg/actuation inhaler 20 mcg by Other route every six (6) hours as needed for Wheezing.  multivit with iron,hematinic (SUPER B-COMPLEX PO) Take  by mouth daily.  fish oil-omega-3 fatty acids 340-1,000 mg capsule Take 1 Capsule by mouth daily.  naproxen (NAPROSYN) 500 mg tablet Take 1 Tablet by mouth two (2) times daily (with meals) for 14 days.  cyclobenzaprine (FLEXERIL) 5 mg tablet Take 1 Tablet by mouth three (3) times daily as needed for Muscle Spasm(s) for up to 4 days.  amitriptyline (ELAVIL) 10 mg tablet Take 2 Tablets by mouth nightly for 30 days.  cholecalciferol (Vitamin D3) (1000 Units /25 mcg) tablet Take 2,000 Units by mouth daily.  losartan (COZAAR) 50 mg tablet Take 50 mg by mouth daily.  metFORMIN (GLUCOPHAGE) 850 mg tablet Take 850 mg by mouth once over twenty-four (24) hours.  amLODIPine (NORVASC) 2.5 mg tablet TAKE 1 TABLET BY MOUTH EVERY DAY (Patient taking differently: Take 2.5 mg by mouth daily.)    ascorbic acid (VITAMIN C) 500 mg tablet Take 1,000 mg by mouth daily.  triamcinolone (NASACORT) 55 mcg nasal inhaler 2 Sprays by Both Nostrils route daily. (Patient not taking: Reported on 8/25/2021)    potassium chloride (K-DUR, KLOR-CON) 10 mEq tablet Take 1 Tab by mouth daily.  (Patient not taking: Reported on 8/25/2021)    ASMANEX TWISTHALER 220 mcg (120 doses) aepb inhaler INHALE 2 PUFFS BY MOUTH TWICE DAILY (Patient not taking: Reported on 2021)     No current facility-administered medications for this visit. Past Medical History - reviewed:  Past Medical History:   Diagnosis Date    Asthma     Bilateral breast cysts 2021    Bronchiectasis     Bronchiectasis ([address] 2019    Bruxism     Chronic bronchitis     Hypertension     Presbyopia of both eyes 2019    Uterine myoma 2020    Vertigo 3/2014         Past Surgical History - reviewed:   Past Surgical History:   Procedure Laterality Date    HX BACK SURGERY      HX  SECTION  1990    x1 w/ twins    HX LOBECTOMY  1996    RML         Social History - reviewed:  Social History     Socioeconomic History    Marital status:      Spouse name: Not on file    Number of children: 3    Years of education: Not on file    Highest education level: Not on file   Occupational History    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   Tobacco Use    Smoking status: Never Smoker    Smokeless tobacco: Never Used   Substance and Sexual Activity    Alcohol use: No    Drug use: No    Sexual activity: Yes     Partners: Male   Other Topics Concern     Service Not Asked    Blood Transfusions Not Asked    Caffeine Concern Not Asked    Occupational Exposure Not Asked    Hobby Hazards Not Asked    Sleep Concern Not Asked    Stress Concern Not Asked    Weight Concern Not Asked    Special Diet Not Asked    Back Care Not Asked    Exercise Yes     Comment: each day    Bike Helmet Not Asked    Seat Belt Not Asked    Self-Exams Not Asked   Social History Narrative    Not on file     Social Determinants of Health     Financial Resource Strain:     Difficulty of Paying Living Expenses:    Food Insecurity:     Worried About Running Out of Food in the Last Year:     Ran Out of Food in the Last Year:    Transportation Needs:     Lack of Transportation (Medical):      Lack of Transportation (Non-Medical):    Physical Activity:     Days of Exercise per Week:  Minutes of Exercise per Session:    Stress:     Feeling of Stress :    Social Connections:     Frequency of Communication with Friends and Family:     Frequency of Social Gatherings with Friends and Family:     Attends Tenriism Services:     Active Member of Clubs or Organizations:     Attends Club or Organization Meetings:     Marital Status:    Intimate Partner Violence:     Fear of Current or Ex-Partner:     Emotionally Abused:     Physically Abused:     Sexually Abused:          Family History - reviewed:  Family History   Problem Relation Age of Onset    Hypertension Mother     Glaucoma Sister     Hypertension Sister          Immunizations - reviewed:   Immunization History   Administered Date(s) Administered    (RETIRED) Pneumococcal Vaccine (Unspecified Type) 11/02/2006    COVID-19, PFIZER, MRNA, LNP-S, PF, 30MCG/0.3ML DOSE 04/01/2021, 04/22/2021    Influenza Vaccine 11/18/2013, 09/25/2014    Influenza Vaccine Whole 10/17/2008    Pneumococcal Polysaccharide (PPSV-23) 01/25/2017    TD Vaccine 10/17/2008    Tdap 01/25/2017    Zoster Recombinant 06/29/2021       ROS: per HPI    Physical Exam  Visit Vitals  /81 (BP 1 Location: Left upper arm, BP Patient Position: Sitting, BP Cuff Size: Adult)   Pulse 94   Temp 97.3 °F (36.3 °C) (Temporal)   Resp 18   Ht 5' 7.5\" (1.715 m)   Wt 157 lb (71.2 kg)   SpO2 96%   BMI 24.23 kg/m²       General appearance - alert, normal appearing weight, well hydrated and anxious  HEENT - pupils equal and reactive, extraocular eye movements intact, left eye normal, right eye normal  TTP in maxillary, frontal and ethmoidal sinuses. TTP @ TM joint  Neck/ Shoulders - supple, no significant adenopathy. TTP and muscle spasms bilaterally.    Neurological - alert, oriented, normal speech, no focal findings or movement disorder noted, cranial nerves II through XII intact, funduscopic exam normal, discs flat and sharp      Assessment/Plan    64year old female with history of chronic-tension type headaches presents to clinic for evaluation of chronic headaches. I believe her HA are likely tensional, but also multifactorial in the setting of bruxism and chronic sinusitis. Patient reports feeling very stressed and that tipically exacerbates her HA, neck and shoulder pain. She was previously following with Physical therapy and this would also help her with her headaches. I have a lower suspicion for migraines and there were no warning signs that would suggest any malignancy. She does suffer from vertigo and tinnitus for which she follows with ENT and had a CT of her sinuses recently done by him. ICD-10-CM ICD-9-CM    1. Chronic tension-type headache, intractable  G44.221 339.12 naproxen (NAPROSYN) 500 mg tablet   2. Muscle spasm  M62.838 728.85 cyclobenzaprine (FLEXERIL) 5 mg tablet   3. Migraine with vertigo  G43.109 346.80      780.4    4. Vertigo  R42 780.4 amitriptyline (ELAVIL) 10 mg tablet   5. Stress  F43.9 V62.89        Chronic tension headaches: in the setting of neck and shoulder muscle spasm.  - Recommended Naproxen BID for 14 days. - Continue home exercises provided by PT.  - Flexeril 5 mg TID. We discussed side effects including somnolence and to avoid using if driving.  - Continue with heating pads as needed. - Continue using Elavil HS as this can also be use for chronic-tension HA ppx    Bruxism:  - Recommended mouth guard HS    Stress:  - Recommended alone time for breathing and relaxation.  - Recommended mindfulness meditation. Chronic Sinusitis  - Follow up with ENT for CT scan results. - Continue daily Allegra. - Continue daily Flonase. - Avoid identifiable triggers and dust mites and pollen. Vertigo:  - Refilled  Amitriptiline  - Recommended to continue following up with ENT      I have discussed the diagnosis with the patient and the intended plan as seen in the above orders.  Patient verbalized understanding of the plan and agrees with the plan. The patient has received an after-visit summary and questions were answered concerning future plans. I have discussed medication side effects and warnings with the patient as well. Informed patient to return to the office if new symptoms arise.     Patient discussed with Dr. Víctor Louise (Attending physician)    Barby Potter MD  Family Medicine Resident

## 2021-08-25 NOTE — PATIENT INSTRUCTIONS
Meclizine for vertigo       Learning About Mindfulness for Stress  What are mindfulness and stress? Stress is what you feel when you have to handle more than you are used to. A lot of things can cause stress. You may feel stress when you go on a job interview, take a test, or run a race. This kind of short-term stress is normal and even useful. It can help you if you need to work hard or react quickly. Stress also can last a long time. Long-term stress is caused by stressful situations or events. Examples of long-term stress include long-term health problems, ongoing problems at work, and conflicts in your family. Long-term stress can harm your health. Mindfulness is a focus only on things happening in the present moment. It's a process of purposefully paying attention to and being aware of your surroundings, your emotions, your thoughts, and how your body feels. You are aware of these things, but you aren't judging these experiences as \"good\" or \"bad. \" Mindfulness can help you learn to calm your mind and body to help you cope with illness, pain, and stress. How does mindfulness help to relieve stress? Mindfulness can help quiet your mind and relax your body. Studies show that it can help some people sleep better, feel less anxious, and bring their blood pressure down. And it's been shown to help some people live and cope better with certain health problems like heart disease, depression, chronic pain, and cancer. How do you practice mindfulness? To be mindful is to pay attention, to be present, and to be accepting. · When you're mindful, you do just one thing and you pay close attention to that one thing. For example, you may sit quietly and notice your emotions or how your food tastes and smells. · When you're present, you focus on the things that are happening right now. You let go of your thoughts about the past and the future.  When you dwell on the past or the future, you miss moments that can heal and strengthen you. You may miss moments like hearing a child laugh or seeing a friendly face when you think you're all alone. · When you're accepting, you don't  the present moment. Instead you accept your thoughts and feelings as they come. You can practice anytime, anywhere, and in any way you choose. You can practice in many ways. Here are a few ideas:  · While doing your chores, like washing the dishes, let your mind focus on what's in your hand. What does the dish feel like? Is the water warm or cold? · Go outside and take a few deep breaths. What is the air like? Is it warm or cold? · When you can, take some time at the start of your day to sit alone and think. · Take a slow walk by yourself. Count your steps while you breathe in and out. · Try yoga breathing exercises, stretches, and poses to strengthen and relax your muscles. · At work, if you can, try to stop for a few moments each hour. Note how your body feels. Let yourself regroup and let your mind settle before you return to what you were doing. · If you struggle with anxiety or \"worry thoughts,\" imagine your mind as a blue suha and your worry thoughts as clouds. Now imagine those worry thoughts floating across your mind's suha. Just let them pass by as you watch. Follow-up care is a key part of your treatment and safety. Be sure to make and go to all appointments, and call your doctor if you are having problems. It's also a good idea to know your test results and keep a list of the medicines you take. Where can you learn more? Go to http://www.gray.com/  Enter M676 in the search box to learn more about \"Learning About Mindfulness for Stress. \"  Current as of: August 31, 2020               Content Version: 12.8  © 2357-7349 Healthwise, Incorporated. Care instructions adapted under license by [employer] (which disclaims liability or warranty for this information).  If you have questions about a medical condition or this instruction, always ask your healthcare professional. [address] any warranty or liability for your use of this information. Tension Headache: Care Instructions  Overview  Most headaches are tension headaches. Some people get them often, especially if they have a lot of stress in their lives. This kind of headache may cause pain or a feeling of pressure all over your head. Sometimes it's hard to know where the center of the pain is. If you get a lot of these kind of headaches, the best way to reduce them is to find out what's causing them. Then you can make changes in those areas. Follow-up care is a key part of your treatment and safety. Be sure to make and go to all appointments, and call your doctor if you are having problems. It's also a good idea to know your test results and keep a list of the medicines you take. How can you care for yourself at home? · Rest in a quiet, dark room. Put a cool cloth on your forehead. Close your eyes, and try to relax or go to sleep. Do not watch TV, read, or use the computer. · Use a warm, moist towel or a heating pad set on low to relax tight shoulder and neck muscles. · Have someone gently massage your neck and shoulders. · Be safe with medicines. Read and follow all instructions on the label. ? If the doctor gave you a prescription medicine for pain, take it as prescribed. ? If you are not taking a prescription pain medicine, ask your doctor if you can take an over-the-counter medicine. · Be careful not to take more pain medicine than the instructions say. This is because you may get worse or more frequent headaches when the medicine wears off. · If you get a headache, stop what you are doing and sit quietly for a moment. Close your eyes and breathe slowly. Try to relax your head and neck muscles. · Pay attention to any new symptoms you have when you have a headache.  These include a fever, weakness or numbness, vision changes, or confusion. They may be signs of a more serious problem. To help prevent headaches  · Keep a headache diary. This can help you and your doctor figure out what triggers your headaches. If you avoid your triggers, you may be able to prevent headaches. · It's good to include several things in your headache diary. Write down when a headache begins and how long it lasts. Try to describe what the pain was like (throbbing, aching, stabbing, or dull). Then add anything you think may have triggered the headache. This could include stress, anxiety, or depression. It could also include hunger, anger, or fatigue. Sometimes, bad posture and muscle strain are triggers for people. · Find healthy ways to deal with stress. Headaches are most common during or right after stressful times. Take time to relax before and after you do something that caused a headache in the past.  · Get plenty of exercise every day. Go for a walk or jog, ride a bike, or find other ways to be active. This can help with stress and muscle tension. · Get regular sleep. · Eat regularly and well. If you wait too long to eat, it can trigger a headache. · If you have the time and money, you may want to try massage. Some people find that regular massages really help relieve tension. · Try to use good posture and keep the muscles of your jaw, face, neck, and shoulders relaxed. If you sit at a desk, change positions often. Try to stretch for 30 seconds every hour. · If you use a computer a lot, you can do things to make your eyes less tired. Try blinking more and sometimes looking away from the screen. Be sure to use glasses or contacts if you need them. And check that your monitor is about an arm's distance away from you. When should you call for help? Call 911 anytime you think you may need emergency care. For example, call if:    · You have signs of a stroke.  These may include:  ? Sudden numbness, paralysis, or weakness in your face, arm, or leg, especially on only one side of your body. ? Sudden vision changes. ? Sudden trouble speaking. ? Sudden confusion or trouble understanding simple statements. ? Sudden problems with walking or balance. ? A sudden, severe headache that is different from past headaches. Call your doctor now or seek immediate medical care if:    · You have new or worse nausea and vomiting.     · You have a new or higher fever.     · Your headache gets much worse. Watch closely for changes in your health, and be sure to contact your doctor if:    · You are not getting better after 2 days (48 hours). Where can you learn more? Go to http://www.gray.com/  Enter C544 in the search box to learn more about \"Tension Headache: Care Instructions. \"  Current as of: August 4, 2020               Content Version: 12.8  © 6532-2420 Healthwise, Incorporated. Care instructions adapted under license by [employer] (which disclaims liability or warranty for this information). If you have questions about a medical condition or this instruction, always ask your healthcare professional. [place] [address] any warranty or liability for your use of this information.

## 2021-10-14 DIAGNOSIS — I10 HYPERTENSION, UNSPECIFIED TYPE: Primary | ICD-10-CM

## 2021-10-14 NOTE — TELEPHONE ENCOUNTER
Patient called for this refill as saying the my chart would not let her choose this medication for a refill but it did for the other 3 that she sent.

## 2021-10-19 RX ORDER — LOSARTAN POTASSIUM 50 MG/1
50 TABLET ORAL DAILY
Qty: 90 TABLET | Refills: 5 | Status: SHIPPED | OUTPATIENT
Start: 2021-10-19 | End: 2022-06-17

## 2021-12-06 ENCOUNTER — NURSE TRIAGE (OUTPATIENT)
Dept: OTHER | Facility: CLINIC | Age: 56
End: 2021-12-06

## 2021-12-06 NOTE — TELEPHONE ENCOUNTER
Reason for Disposition   MODERATE OR MILD pain that comes and goes (cramps) lasts > 24 hours    Answer Assessment - Initial Assessment Questions  1. LOCATION: \"Where does it hurt? \"       Below umbilicus. 2. RADIATION: \"Does the pain shoot anywhere else? \" (e.g., chest, back)      No    3. ONSET: \"When did the pain begin? \" (e.g., minutes, hours or days ago)       One week ago--? resolved spontaneously    4. SUDDEN: \"Gradual or sudden onset? \"      Gradual    5. PATTERN \"Does the pain come and go, or is it constant? \"     - If constant: \"Is it getting better, staying the same, or worsening? \"       (Note: Constant means the pain never goes away completely; most serious pain is constant and it progresses)      - If intermittent: \"How long does it last?\" \"Do you have pain now? \"      (Note: Intermittent means the pain goes away completely between bouts)      Worse when she doesn't eat. 6. SEVERITY: \"How bad is the pain? \"  (e.g., Scale 1-10; mild, moderate, or severe)    - MILD (1-3): doesn't interfere with normal activities, abdomen soft and not tender to touch     - MODERATE (4-7): interferes with normal activities or awakens from sleep, tender to touch     - SEVERE (8-10): excruciating pain, doubled over, unable to do any normal activities       8/10    7. RECURRENT SYMPTOM: \"Have you ever had this type of abdominal pain before? \" If so, ask: \"When was the last time? \" and \"What happened that time? \"       Similar to episodes of H pylori in the past.    8. CAUSE: \"What do you think is causing the abdominal pain? \"      H pylori    9. RELIEVING/AGGRAVATING FACTORS: \"What makes it better or worse? \" (e.g., movement, antacids, bowel movement)      Small meals relieve pain a bit    10. OTHER SYMPTOMS: \"Has there been any vomiting, diarrhea, constipation, or urine problems? \"        Nausea    11. PREGNANCY: \"Is there any chance you are pregnant? \" \"When was your last menstrual period? \"        ?menopausal.   Has IUD    Protocols used: ABDOMINAL PAIN - FEMALE-ADULT-[place]    Received call from Daisy Mccartney [id number] at [facility] with Red Flag Complaint. Brief description of triage: Allie-umbilical pain x 1 day. Similar episode which resolved spontaneously one week ago. Similar to H pylori episodes in the past.    Triage indicates for patient to be seen in office today--pt does not have transportation and will come tomorrow morning. Told [facility] prn. Agrees. Care advice provided, patient verbalizes understanding; denies any other questions or concerns; instructed to call back for any new or worsening symptoms. Writer sent text to [address] [facility] at [facility] for appointment scheduling. Attention Provider: Thank you for allowing me to participate in the care of your patient. The patient was connected to triage in response to information provided to the [facility]. Please do not respond through this encounter as the response is not directed to a shared pool.

## 2021-12-07 ENCOUNTER — VIRTUAL VISIT (OUTPATIENT)
Dept: FAMILY MEDICINE CLINIC | Age: 56
End: 2021-12-07
Payer: MEDICAID

## 2021-12-07 ENCOUNTER — TELEPHONE (OUTPATIENT)
Dept: FAMILY MEDICINE CLINIC | Age: 56
End: 2021-12-07

## 2021-12-07 DIAGNOSIS — R10.9 ABDOMINAL DISCOMFORT: Primary | ICD-10-CM

## 2021-12-07 DIAGNOSIS — R42 VERTIGO: ICD-10-CM

## 2021-12-07 DIAGNOSIS — H93.19 TINNITUS, UNSPECIFIED LATERALITY: ICD-10-CM

## 2021-12-07 DIAGNOSIS — G43.109 MIGRAINE WITH VERTIGO: ICD-10-CM

## 2021-12-07 PROCEDURE — 99214 OFFICE O/P EST MOD 30 MIN: CPT | Performed by: STUDENT IN AN ORGANIZED HEALTH CARE EDUCATION/TRAINING PROGRAM

## 2021-12-07 RX ORDER — PANTOPRAZOLE SODIUM 40 MG/1
40 TABLET, DELAYED RELEASE ORAL DAILY
Qty: 30 TABLET | Refills: 0 | Status: SHIPPED | OUTPATIENT
Start: 2021-12-07 | End: 2022-01-26

## 2021-12-07 NOTE — PROGRESS NOTES
Henok Rawls  64 y.o. female  1965   [employer]  [id number]    Telemedicine Progress Note  Lissett Sharma MD       Encounter Date and Time: December 7, 2021 at 10:52 AM    Consent: Henok Rawls, who was seen by synchronous (real-time) audio only technology, and/or her healthcare decision maker, is aware that this patient-initiated, Telehealth encounter on 12/7/2021 is a billable service, with coverage as determined by her insurance carrier. She is aware that she may receive a bill and has provided verbal consent to proceed: Yes. Chief Complaint   Patient presents with    Abdominal Pain     History of Present Illness   Henok Rawls is a 64 y.o. female was evaluated by synchronous (real-time) audio-video technology from home, through a secure patient portal.    Patient c/o intermittent lower abdominal discomfort. States that it comes on every 2 hours and worsens continuously until patient eats. Resolves upon eating. She reports h/o H pylori and states this feels similar. No other episodes since then. Does not eat deli meats. Pain is accompanied by nausea w/o vomiting. No constipation or diarrhea. No blood in stool. Pain is central, just below the level of the umbilicus and does not radiate. No fever, chills, CP, SOB, dysuria, new back pain. Had colonoscopy when she turned 50 that was wnl. Patient also asking for neuro referral for her numerous neuro symptoms.  Has seen neurologist recently that she did not like and asking for another referral.    Review of Systems   ROS in HPI    Vitals/Objective:     General: alert, cooperative, no distress   Mental  status: mental status: alert, oriented to person, place, and time, normal mood, behavior, speech, dress, motor activity, and thought processes   Resp: resp: normal effort and no respiratory distress   Neuro: neuro: no gross deficits   Skin: skin: no discoloration or lesions of concern on visible areas   Due to this being a TeleHealth evaluation, many elements of the physical examination are unable to be assessed. Assessment and Plan:   Time-based coding, delete if not needed: I spent at least 15 minutes with this established patient, and >50% of the time was spent counseling and/or coordinating care regarding abdominal discomfort, vertigo and other neuro symptoms    1. Abdominal discomfort: Difficult to diagnose as it as aspects of multiple possible etiologies. Ddx includes PUD, GERD. Less likely diverticulitis as colonoscopy 6y ago reportedly wnl. Unlikely UTI as no other symptoms. Not cholecystitis as eating would worsen the pain. Unlikely pancreatitis as patient w/o history of stones or alcohol and is able to tolerate PO. Will trial PPI  - Will trial PPI to see if it improves symptoms. Consider GI referral at follow up if no improvement  - Follow up in 1 week    2. Various neuro symptoms: Patient desires neuro referral  - REFERRAL TO NEUROLOGY    Time spent in direct conversation with the patient to include medical condition(s) discussed, assessment and treatment plan:       We discussed the expected course, resolution and complications of the diagnosis(es) in detail. Medication risks, benefits, costs, interactions, and alternatives were discussed as indicated. I advised her to contact the office if her condition worsens, changes or fails to improve as anticipated. She expressed understanding with the diagnosis(es) and plan. Patient understands that this encounter was a temporary measure, and the importance of further follow up and examination was emphasized. Patient verbalized understanding. Patient informed to follow up: In 1 week. Electronically Signed: Mayra Delatorre MD    CPT Codes 86761-71449 for Established Patients may apply to this Telehealth Visit. POS code: 18. Modifier GT    Swetha Holland is a 64 y.o. female who was evaluated by an audio-video encounter for concerns as above.  Patient identification was verified prior to start of the visit. A caregiver was present when appropriate. Due to this being a TeleHealth encounter (During TIC-68 public health emergency), evaluation of the following organ systems was limited: Vitals/Constitutional/EENT/Resp/CV/GI//MS/Neuro/Skin/Heme-Lymph-Imm. Pursuant to the emergency declaration under the [facility] [place] [address] waiver authority and the [employer] Act, this Virtual Visit was conducted, with patient's (and/or legal guardian's) consent, to reduce the patient's risk of exposure to COVID-19 and provide necessary medical care. Services were provided through a synchronous discussion virtually to substitute for in-person clinic visit. I was at home. The patient was at home. History   Patients past medical, surgical and family histories were reviewed and updated.       Past Medical History:   Diagnosis Date    Asthma     Bilateral breast cysts 2021    Bronchiectasis     Bronchiectasis ([facility] [address] 2019    Bruxism     Chronic bronchitis     Hypertension     Presbyopia of both eyes 2019    Uterine myoma 2020    Vertigo 3/2014     Past Surgical History:   Procedure Laterality Date    HX BACK SURGERY      HX  SECTION  1990    x1 w/ twins    HX LOBECTOMY      RML     Family History   Problem Relation Age of Onset    Hypertension Mother     Glaucoma Sister     Hypertension Sister      Social History     Socioeconomic History    Marital status:      Spouse name: Not on file    Number of children: 3    Years of education: Not on file    Highest education level: Not on file   Occupational History    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   Tobacco Use    Smoking status: Never Smoker    Smokeless tobacco: Never Used   Substance and Sexual Activity    Alcohol use: No    Drug use: No    Sexual activity: Yes     Partners: Male   Other Topics Concern     Service Not Asked    Blood Transfusions Not Asked    Caffeine Concern Not Asked    Occupational Exposure Not Asked    Hobby Hazards Not Asked    Sleep Concern Not Asked    Stress Concern Not Asked    Weight Concern Not Asked    Special Diet Not Asked    Back Care Not Asked    Exercise Yes     Comment: each day    Bike Helmet Not Asked    Seat Belt Not Asked    Self-Exams Not Asked   Social History Narrative    Not on file     Social Determinants of Health     Financial Resource Strain:     Difficulty of Paying Living Expenses: Not on file   Food Insecurity:     Worried About Running Out of Food in the Last Year: Not on file    [place] of Food in the Last Year: Not on file   Transportation Needs:     Lack of Transportation (Medical): Not on file    Lack of Transportation (Non-Medical):  Not on file   Physical Activity:     Days of Exercise per Week: Not on file    Minutes of Exercise per Session: Not on file   Stress:     Feeling of Stress : Not on file   Social Connections:     Frequency of Communication with Friends and Family: Not on file    Frequency of Social Gatherings with Friends and Family: Not on file    Attends Shinto Services: Not on file    Active Member of [address] [place] [address] or Organizations: Not on file    Attends Club or Organization Meetings: Not on file    Marital Status: Not on file   Intimate Partner Violence:     Fear of Current or Ex-Partner: Not on file    Emotionally Abused: Not on file    Physically Abused: Not on file    Sexually Abused: Not on file   Housing Stability:     Unable to Pay for Housing in the Last Year: Not on file    Number of [place] in the Last Year: Not on file    Unstable Housing in the Last Year: Not on file     Patient Active Problem List   Diagnosis Code    [place] syndrome E80.4    Hepatitis A B15.9    Chronic tension headaches G44.229    Insomnia G47.00    DDD (degenerative disc disease) [id number]    HTN (hypertension) I10  Bronchiectasis ([facility] [address] J47.9    Vertigo R42    Health care maintenance Z00.00    Hypokalemia E87.6    Stress F43.9    Muscle spasm M62.838    Migraine with vertigo G43.109          Current Medications/Allergies   Medications and Allergies reviewed:    Current Outpatient Medications   Medication Sig Dispense Refill    pantoprazole (PROTONIX) 40 mg tablet Take 1 Tablet by mouth daily. 30 Tablet 0    losartan (COZAAR) 50 mg tablet Take 1 Tablet by mouth daily. 90 Tablet 5    mometasone (Asmanex Twisthaler) 220 mcg/ actuation (120) aepb inhaler Take 2 Puffs by inhalation two (2) times a day. 1 Each 5    potassium chloride (KLOR-CON) 10 mEq tablet Take 1 Tablet by mouth daily. 30 Tablet 5    triamcinolone (Nasacort) 55 mcg nasal inhaler 2 Sprays by Both Nostrils route daily. 1 Each 5    levonorgestreL (KYLEENA) 17.5 mcg/24 hrs (5 yrs) 19.5 mg IUD IUD 1 Device by IntraUTERine route once.  montelukast (SINGULAIR) 10 mg tablet Take 10 mg by mouth daily.  potassium gluconate 600 mg (99 mg) tab Take  by mouth two (2) times a day.  fexofenadine (ALLEGRA) 180 mg tablet Take 180 mg by mouth daily.  clarithromycin (BIAXIN) 250 mg tablet Take 250 mg by mouth two (2) times a day. Twice every other day.  metoprolol tartrate (LOPRESSOR) 100 mg IR tablet Take 25 mg by mouth daily.  fluticasone propionate (FLONASE) 50 mcg/actuation nasal spray 2 Sprays by Both Nostrils route daily.  ipratropium (ATROVENT HFA) 17 mcg/actuation inhaler 20 mcg by Other route every six (6) hours as needed for Wheezing.  multivit with iron,hematinic (SUPER B-COMPLEX PO) Take  by mouth daily.  fish oil-omega-3 fatty acids 340-1,000 mg capsule Take 1 Capsule by mouth daily.  cholecalciferol (Vitamin D3) (1000 Units /25 mcg) tablet Take 2,000 Units by mouth daily.  metFORMIN (GLUCOPHAGE) 850 mg tablet Take 850 mg by mouth once over twenty-four (24) hours.       amLODIPine (NORVASC) 2.5 mg tablet TAKE 1 TABLET BY MOUTH EVERY DAY (Patient taking differently: Take 2.5 mg by mouth daily.) 30 Tab 3    ascorbic acid (VITAMIN C) 500 mg tablet Take 1,000 mg by mouth daily.        Allergies   Allergen Reactions    Codeine Rash and Itching

## 2021-12-09 NOTE — PROGRESS NOTES
[address] Medicine Residency Attending Addendum:  Dr. Souleymane Chavarria MD,  the patient and I were not physically present during this encounter. The resident and I are concurrently monitoring the patient care through appropriate telecommunication technology. I discussed the findings, assessment and plan with the resident and agree with the resident's findings and plan as documented in the resident's note.       Shawna Doshi MD

## 2021-12-22 ENCOUNTER — TELEPHONE (OUTPATIENT)
Dept: FAMILY MEDICINE CLINIC | Age: 56
End: 2021-12-22

## 2021-12-22 DIAGNOSIS — R10.9 ABDOMINAL DISCOMFORT: Primary | ICD-10-CM

## 2021-12-22 NOTE — TELEPHONE ENCOUNTER
----- Message from Mohan Raygoza sent at 12/22/2021  2:29 PM EST -----  Subject: Message to Provider    QUESTIONS  Information for Provider? pt stated she was advised to call in if the   medication isnt make her better the pt stated that her stomach is still   hurting and would like to speak with the provider she seen   ---------------------------------------------------------------------------  --------------  [address]  What is the best way for the office to contact you? OK to leave message on   voicemail  Preferred Call Back Phone Number? [phone]  ---------------------------------------------------------------------------  --------------  SCRIPT ANSWERS  Relationship to Patient?  Self

## 2021-12-23 NOTE — TELEPHONE ENCOUNTER
Called patient - no reply. Left VM with my personal number for callback. Plan is to d/c PPI and refer patient to GI for eval - ideally in a couple of weeks so the patients PPI use doesn't interfere with H pylori testing. Due to the patients s/s being quite unusual and nonspecific, dx is difficult and ddx is broad. Because of this, I believe GI eval is appropriate.     Rachel Pallas, MD

## 2022-01-03 ENCOUNTER — VIRTUAL VISIT (OUTPATIENT)
Dept: FAMILY MEDICINE CLINIC | Age: 57
End: 2022-01-03
Payer: MEDICAID

## 2022-01-03 DIAGNOSIS — R20.0 NUMBNESS AND TINGLING IN LEFT HAND: ICD-10-CM

## 2022-01-03 DIAGNOSIS — R20.2 NUMBNESS AND TINGLING IN LEFT HAND: ICD-10-CM

## 2022-01-03 DIAGNOSIS — J01.41 ACUTE RECURRENT PANSINUSITIS: Primary | ICD-10-CM

## 2022-01-03 DIAGNOSIS — R42 VERTIGO: ICD-10-CM

## 2022-01-03 PROCEDURE — 99442 PR PHYS/QHP TELEPHONE EVALUATION 11-20 MIN: CPT | Performed by: STUDENT IN AN ORGANIZED HEALTH CARE EDUCATION/TRAINING PROGRAM

## 2022-01-03 RX ORDER — SUMATRIPTAN 50 MG/1
TABLET, FILM COATED ORAL
COMMUNITY
Start: 2021-12-06 | End: 2022-04-07

## 2022-01-03 RX ORDER — AMOXICILLIN AND CLAVULANATE POTASSIUM 875; 125 MG/1; MG/1
1 TABLET, FILM COATED ORAL 2 TIMES DAILY
Qty: 10 TABLET | Refills: 0 | Status: SHIPPED | OUTPATIENT
Start: 2022-01-03 | End: 2022-01-08

## 2022-01-03 RX ORDER — FLUTICASONE PROPIONATE 50 MCG
2 SPRAY, SUSPENSION (ML) NASAL DAILY
Qty: 1 EACH | Refills: 0 | Status: SHIPPED | OUTPATIENT
Start: 2022-01-03 | End: 2022-01-26

## 2022-01-03 RX ORDER — ONDANSETRON 4 MG/1
4 TABLET, ORALLY DISINTEGRATING ORAL
Qty: 30 TABLET | Refills: 0 | Status: SHIPPED | OUTPATIENT
Start: 2022-01-03 | End: 2022-07-21

## 2022-01-03 RX ORDER — MECLIZINE HCL 12.5 MG 12.5 MG/1
12.5 TABLET ORAL
Qty: 30 TABLET | Refills: 0 | Status: SHIPPED | OUTPATIENT
Start: 2022-01-03 | End: 2022-02-01

## 2022-01-03 RX ORDER — CEFDINIR 300 MG/1
CAPSULE ORAL
COMMUNITY
Start: 2021-09-30 | End: 2022-01-03

## 2022-01-03 RX ORDER — TOPIRAMATE 100 MG/1
100 TABLET, FILM COATED ORAL
COMMUNITY
Start: 2021-11-29 | End: 2022-01-26

## 2022-01-03 RX ORDER — TOPIRAMATE 50 MG/1
TABLET, FILM COATED ORAL
COMMUNITY
Start: 2021-11-19 | End: 2022-01-26

## 2022-01-03 NOTE — PROGRESS NOTES
[address] Medicine Residency Attending Addendum:  Dr. Bobby Navarrete, DO,  the patient and I were not physically present during this encounter. The resident and I are concurrently monitoring the patient care through appropriate telecommunication technology. I discussed the findings, assessment and plan with the resident and agree with the resident's findings and plan as documented in the resident's note.       Corazon Dennis MD

## 2022-01-03 NOTE — PROGRESS NOTES
Valentin Miller  64 y.o. female  1965   [place]  [id number]    [phone] (home)      [address]:    Telephone Encounter  Zac [place]       Encounter Date: 1/3/2022 at 9:54 AM    Consent: Valentin Miller, who was seen by synchronous (real-time) audio only technology, and/or her healthcare decision maker, is aware that this patient-initiated, Telehealth encounter on 1/3/2022 is a billable service, with coverage as determined by her insurance carrier. She is aware that she may receive a bill and has provided verbal consent to proceed: Yes. Chief Complaint   Patient presents with    Sinus Infection       History of Present Illness   Valentin Miller is a 64 y.o. female was evaluated by telephone. I communicated with the patient and/or health care decision maker about cold symptoms. Has pressure on her cheeks and above eyebrows and ears w/ significant postnasal drip for one week. Also has headache that  doesn't feel like this is her typical migraine headache. No cough. Mucus is clear. Endorses recurrent sinus infections in the past    Patient also mentions slight worsening of her chronic dizziness since 2014. Recently seen by neurology for this who attributes it to migraines and put her on topomax. Also has had ringing in the ears for 2 years. Also mentions intermittent tingling/numbness/weakness in her left palm, not fingers, that radiates from shoulder. Does endorse neck pain as well. These symptoms seem to have worsened in the last week however have been present for ~1.5 months. Review of Systems   Negative for those mentioned in the HPI. Vitals/Objective:   General: Patient speaking in complete sentences without effort. Normal speech and cooperative. Due to this being a Virtual Check-in/Telephone evaluation, many elements of the physical examination are unable to be assessed. Assessment and Plan:    Total Time: minutes: 11-20 minutes    1. Acute recurrent pansinusitis: will treat nausea 2/2 postnasal drip with zofran. - amoxicillin-clavulanate (AUGMENTIN) 875-125 mg per tablet; Take 1 Tablet by mouth two (2) times a day for 5 days. Dispense: 10 Tablet; Refill: 0  - ondansetron (ZOFRAN ODT) 4 mg disintegrating tablet; Take 1 Tablet by mouth every eight (8) hours as needed for Nausea or Vomiting. Dispense: 30 Tablet; Refill: 0  - fluticasone propionate (FLONASE) 50 mcg/actuation nasal spray; 2 Sprays by Both Nostrils route daily. Dispense: 1 Each; Refill: 0    2. Numbness and tingling in left hand: Concern for cervical radiculopathy, less likely carpal tunnel given radiation from shoulder. XR cervical spine 7/2021 with well maintained intervetebral disc spaces, mild retrolisthesis. Will hold off on ordering MRI at this time until evaluated in office given unremarkable xray findings recently. - office to schedule sports med visit     3. Vertigo: chronic. Has seen neurology and a cause not identified. Has received meclizine in the hospital which did help. Per chart review, it looks like she was given some outpatient in 2010 and 2014. Will retry. Sees neurology on Feb 4 for a second opinion.  - Meclizine 12.5mg TID       Will follow up in 2 weeks to reevaluate vertigo. Follow up in clinic for sports med visit,  to schedule. I affirm this is a Patient Initiated Episode with an Established Patient who has not had a related appointment within my department in the past 7 days or scheduled within the next 24 hours. Note: not billable if this call serves to triage the patient into an appointment for the relevant concern      Electronically Signed: Melody Mauro DO  Providers location when delivering service: home    CPT:  55156 (5-10 minutes)  05714 (11-20 minutes)  21  (21-30 minutes)    Medicare:  [address]      ICD-10-CM ICD-9-CM    1.  Acute recurrent pansinusitis  J01.41 461.8 amoxicillin-clavulanate (AUGMENTIN) 875-125 mg per tablet      ondansetron (ZOFRAN ODT) 4 mg disintegrating tablet      fluticasone propionate (FLONASE) 50 mcg/actuation nasal spray   2. Numbness and tingling in left hand  R20.0 782.0     R20.2     3. Vertigo  R42 780.4        Pursuant to the emergency declaration under the [facility], [facility] authority and the [employer] Act, this Virtual  Visit was conducted, with patient's consent, to reduce the patient's risk of exposure to COVID-19 and provide continuity of care for an established patient. History   Patients past medical, surgical and family histories were personally reviewed and updated. Past Medical History:   Diagnosis Date    Asthma     Bilateral breast cysts 2021    Bronchiectasis     Bronchiectasis ([address] 2019    Bruxism     Chronic bronchitis     Hypertension     Presbyopia of both eyes 2019    Uterine myoma 2020    Vertigo 3/2014     Past Surgical History:   Procedure Laterality Date    HX BACK SURGERY      HX  SECTION  1990    x1 w/ twins    HX LOBECTOMY      RML     Family History   Problem Relation Age of Onset    Hypertension Mother    [facility] Glaucoma Sister     Hypertension Sister      Social History     Tobacco Use    Smoking status: Never Smoker    Smokeless tobacco: Never Used   Substance Use Topics    Alcohol use: No    Drug use: No              Current Medications/Allergies   Medications and Allergies reviewed:    Current Outpatient Medications   Medication Sig Dispense Refill    amoxicillin-clavulanate (AUGMENTIN) 875-125 mg per tablet Take 1 Tablet by mouth two (2) times a day for 5 days. 10 Tablet 0    ondansetron (ZOFRAN ODT) 4 mg disintegrating tablet Take 1 Tablet by mouth every eight (8) hours as needed for Nausea or Vomiting.  30 Tablet 0    fluticasone propionate (FLONASE) 50 mcg/actuation nasal spray 2 Sprays by Both Nostrils route daily. 1 Each 0    SUMAtriptan (IMITREX) 50 mg tablet       topiramate (TOPAMAX) 100 mg tablet       topiramate (TOPAMAX) 50 mg tablet       pantoprazole (PROTONIX) 40 mg tablet Take 1 Tablet by mouth daily. 30 Tablet 0    losartan (COZAAR) 50 mg tablet Take 1 Tablet by mouth daily. 90 Tablet 5    mometasone (Asmanex Twisthaler) 220 mcg/ actuation (120) aepb inhaler Take 2 Puffs by inhalation two (2) times a day. 1 Each 5    potassium chloride (KLOR-CON) 10 mEq tablet Take 1 Tablet by mouth daily. 30 Tablet 5    triamcinolone (Nasacort) 55 mcg nasal inhaler 2 Sprays by Both Nostrils route daily. 1 Each 5    levonorgestreL (KYLEENA) 17.5 mcg/24 hrs (5 yrs) 19.5 mg IUD IUD 1 Device by IntraUTERine route once.  montelukast (SINGULAIR) 10 mg tablet Take 10 mg by mouth daily.  potassium gluconate 600 mg (99 mg) tab Take  by mouth two (2) times a day.  fexofenadine (ALLEGRA) 180 mg tablet Take 180 mg by mouth daily.  clarithromycin (BIAXIN) 250 mg tablet Take 250 mg by mouth two (2) times a day. Twice every other day.  metoprolol tartrate (LOPRESSOR) 100 mg IR tablet Take 25 mg by mouth daily.  fluticasone propionate (FLONASE) 50 mcg/actuation nasal spray 2 Sprays by Both Nostrils route daily.  ipratropium (ATROVENT HFA) 17 mcg/actuation inhaler 20 mcg by Other route every six (6) hours as needed for Wheezing.  multivit with iron,hematinic (SUPER B-COMPLEX PO) Take  by mouth daily.  fish oil-omega-3 fatty acids 340-1,000 mg capsule Take 1 Capsule by mouth daily.  cholecalciferol (Vitamin D3) (1000 Units /25 mcg) tablet Take 2,000 Units by mouth daily.  metFORMIN (GLUCOPHAGE) 850 mg tablet Take 850 mg by mouth once over twenty-four (24) hours.       amLODIPine (NORVASC) 2.5 mg tablet TAKE 1 TABLET BY MOUTH EVERY DAY (Patient taking differently: Take 2.5 mg by mouth daily.) 30 Tab 3    ascorbic acid (VITAMIN C) 500 mg tablet Take 1,000 mg by mouth daily.        Allergies   Allergen Reactions    Codeine Rash and Itching

## 2022-01-05 ENCOUNTER — TELEPHONE (OUTPATIENT)
Dept: FAMILY MEDICINE CLINIC | Age: 57
End: 2022-01-05

## 2022-01-05 NOTE — TELEPHONE ENCOUNTER
----- Message from Cirilo Rao DO sent at 1/3/2022 10:16 AM EST -----  Regarding: sports med visit  At nearest opening for radiating intermittent left arm numbness/pain.   Thanks! (pt speaks perfect english, no need for )

## 2022-01-25 ENCOUNTER — OFFICE VISIT (OUTPATIENT)
Dept: FAMILY MEDICINE CLINIC | Age: 57
End: 2022-01-25
Payer: MEDICAID

## 2022-01-25 VITALS
TEMPERATURE: 98 F | BODY MASS INDEX: 22.94 KG/M2 | DIASTOLIC BLOOD PRESSURE: 79 MMHG | WEIGHT: 151.4 LBS | OXYGEN SATURATION: 96 % | SYSTOLIC BLOOD PRESSURE: 139 MMHG | HEIGHT: 68 IN | HEART RATE: 66 BPM

## 2022-01-25 DIAGNOSIS — M54.2 CERVICALGIA: ICD-10-CM

## 2022-01-25 DIAGNOSIS — F40.248 OTHER SITUATIONAL TYPE PHOBIA: ICD-10-CM

## 2022-01-25 DIAGNOSIS — G44.221 CHRONIC TENSION-TYPE HEADACHE, INTRACTABLE: ICD-10-CM

## 2022-01-25 DIAGNOSIS — M54.12 RADICULOPATHY OF CERVICAL SPINE: Primary | ICD-10-CM

## 2022-01-25 DIAGNOSIS — R42 VERTIGO: ICD-10-CM

## 2022-01-25 PROCEDURE — 99214 OFFICE O/P EST MOD 30 MIN: CPT | Performed by: STUDENT IN AN ORGANIZED HEALTH CARE EDUCATION/TRAINING PROGRAM

## 2022-01-25 RX ORDER — ALPRAZOLAM 1 MG/1
1 TABLET ORAL
Qty: 1 TABLET | Refills: 0 | Status: SHIPPED | OUTPATIENT
Start: 2022-01-25 | End: 2022-01-25

## 2022-01-25 NOTE — PROGRESS NOTES
Chief Complaint   Patient presents with    Follow-up     sinus     Visit Vitals  /79 (BP 1 Location: Left upper arm, BP Patient Position: Sitting, BP Cuff Size: Large adult)   Pulse 66   Temp 98 °F (36.7 °C) (Oral)   Ht 5' 7.5\" (1.715 m)   Wt 151 lb 6.4 oz (68.7 kg)   SpO2 96%   BMI 23.36 kg/m²

## 2022-01-25 NOTE — PROGRESS NOTES
Louisa Tejada is an 64 y.o. female who presents for follow up on sinus infection. Her sinus infection have improve after taking Augmentin and Flonase. Additionally, reports left arm numbness, tingling and burning for the past 9 months associated Lt. Sided neck pain and point tenderness. Pain and paresthesias start in the neck and radiates down her arm. Also reports occasional  left hand weakness, sometimes to the point of not being able to open Ziploc bags. Flexion and lateral neck movements trigger symptoms. Her weakness is only occasional.     Review of Systems   Constitutional: Positive for malaise/fatigue. Negative for chills, fever and weight loss. HENT: Positive for tinnitus. Respiratory: Negative for cough and shortness of breath. Cardiovascular: Negative for chest pain. Gastrointestinal: Negative for abdominal pain, constipation, diarrhea, nausea and vomiting. Musculoskeletal: Positive for myalgias and neck pain. Neurological: Positive for dizziness, sensory change and headaches. Negative for focal weakness. Past Medical History - reviewed:  Past Medical History:   Diagnosis Date    Asthma     Bilateral breast cysts 02/2021    Bronchiectasis     Bronchiectasis ([address] 12/2019    Bruxism     Chronic bronchitis     Hypertension     Presbyopia of both eyes 01/2019    Uterine myoma 12/2020    Vertigo 3/2014       Medications - reviewed:   Current Outpatient Medications   Medication Sig    ondansetron (ZOFRAN ODT) 4 mg disintegrating tablet Take 1 Tablet by mouth every eight (8) hours as needed for Nausea or Vomiting.  SUMAtriptan (IMITREX) 50 mg tablet     losartan (COZAAR) 50 mg tablet Take 1 Tablet by mouth daily.  mometasone (Asmanex Twisthaler) 220 mcg/ actuation (120) aepb inhaler Take 2 Puffs by inhalation two (2) times a day.  triamcinolone (Nasacort) 55 mcg nasal inhaler 2 Sprays by Both Nostrils route daily.     levonorgestreL (KYLEENA) 17.5 mcg/24 hrs (5 yrs) 19.5 mg IUD IUD 1 Device by IntraUTERine route once.  montelukast (SINGULAIR) 10 mg tablet Take 10 mg by mouth daily.  metoprolol tartrate (LOPRESSOR) 100 mg IR tablet Take 25 mg by mouth daily.  fluticasone propionate (FLONASE) 50 mcg/actuation nasal spray 2 Sprays by Both Nostrils route daily.  ipratropium (ATROVENT HFA) 17 mcg/actuation inhaler 20 mcg by Other route every six (6) hours as needed for Wheezing.  fish oil-omega-3 fatty acids 340-1,000 mg capsule Take 1 Capsule by mouth daily.  cholecalciferol (Vitamin D3) (1000 Units /25 mcg) tablet Take 5,000 Units by mouth daily.  amLODIPine (NORVASC) 2.5 mg tablet TAKE 1 TABLET BY MOUTH EVERY DAY (Patient taking differently: Take 2.5 mg by mouth daily.)     No current facility-administered medications for this visit.          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   Tobacco Use    Smoking status: Never Smoker    Smokeless tobacco: Never Used   Substance and Sexual Activity    Alcohol use: No    Drug use: No    Sexual activity: Yes     Partners: Male   Other Topics Concern     Service Not Asked    Blood Transfusions Not Asked    Caffeine Concern Not Asked    Occupational Exposure Not Asked    Hobby Hazards Not Asked    Sleep Concern Not Asked    Stress Concern Not Asked    Weight Concern Not Asked    Special Diet Not Asked    Back Care Not Asked    Exercise Yes     Comment: each day    Bike Helmet Not Asked    Seat Belt Not Asked    Self-Exams Not Asked   Social History Narrative    Not on file     Social Determinants of Health     Financial Resource Strain:     Difficulty of Paying Living Expenses: Not on file   Food Insecurity:     Worried About Running Out of Food in the Last Year: Not on file    Pierre of Food in the Last Year: Not on file   Transportation Needs:     Lack of Transportation (Medical): Not on file    Lack of Transportation (Non-Medical): Not on file   Physical Activity:     Days of Exercise per Week: Not on file    Minutes of Exercise per Session: Not on file   Stress:     Feeling of Stress : Not on file   Social Connections:     Frequency of Communication with Friends and Family: Not on file    Frequency of Social Gatherings with Friends and Family: Not on file    Attends Restorationism Services: Not on file    Active Member of Clubs or Organizations: Not on file    Attends Club or Organization Meetings: Not on file    Marital Status: Not on file   Intimate Partner Violence:     Fear of Current or Ex-Partner: Not on file    Emotionally Abused: Not on file    Physically Abused: Not on file    Sexually Abused: Not on file   Housing Stability:     Unable to Pay for Housing in the Last Year: Not on file    Number of [place] in the Last Year: Not on file    Unstable Housing in the Last Year: Not on file         Family History - reviewed:  Family History   Problem Relation Age of Onset    Hypertension Mother     Glaucoma Sister     Hypertension Sister        Allergies - reviewed:    Allergies   Allergen Reactions    Codeine Rash and Itching       Immunizations - reviewed:   Immunization History   Administered Date(s) Administered    (RETIRED) Pneumococcal Vaccine (Unspecified Type) 11/02/2006    COVID-19, Pfizer Purple top, DILUTE for use, 12+ yrs, 30mcg/0.3mL dose 04/01/2021, 04/22/2021    Influenza Vaccine 11/18/2013, 09/25/2014    Influenza Vaccine Whole 10/17/2008    Pneumococcal Polysaccharide (PPSV-23) 01/25/2017    TD Vaccine 10/17/2008    Tdap 01/25/2017    Zoster Recombinant 06/29/2021         Physical Exam  Visit Vitals  /79 (BP 1 Location: Left upper arm, BP Patient Position: Sitting, BP Cuff Size: Large adult)   Pulse 66   Temp 98 °F (36.7 °C) (Oral)   Ht 5' 7.5\" (1.715 m)   Wt 151 lb 6.4 oz (68.7 kg)   SpO2 96%   BMI 23.36 kg/m²       Physical Exam  Constitutional:       General: She is not in acute distress. Appearance: She is normal weight. HENT:      Head: Normocephalic. Cardiovascular:      Rate and Rhythm: Normal rate and regular rhythm. Heart sounds: Normal heart sounds. Pulmonary:      Breath sounds: Normal breath sounds. Abdominal:      General: Abdomen is flat. Palpations: Abdomen is soft. Musculoskeletal:         General: Normal range of motion. Right lower leg: No edema. Left lower leg: No edema. Neurological:      General: No focal deficit present. Mental Status: She is alert. Cranial Nerves: Cranial nerves are intact. Sensory: Sensation is intact. Motor: Motor function is intact. No weakness or tremor. Deep Tendon Reflexes:      Reflex Scores:       Tricep reflexes are 2+ on the right side and 2+ on the left side. Brachioradialis reflexes are 2+ on the right side and 2+ on the left side. Patellar reflexes are 2+ on the right side and 2+ on the left side. Comments: Lhermitte's sign negative  Spurling sign negative        XR Results (most recent):  Results from Appointment encounter on 07/20/21    XR SPINE CERV PA LAT ODONT 3 V MAX    Narrative  Cervical spine 2 views dated 7/20/2021. Comparison is cervical spine dated 1/27/2010    History is neck pain    AP and lateral views of the cervical spine were obtained. The cervical vertebrae  are of normal height and alignment. The intervertebral disc spaces are  well-maintained. There is very mild retrolisthesis of C5 on C6.  In retrospect,  this very subtle finding was present at the time of the previous examination of  1/20/2017 and has not changed in appearance since that time. Impression  Evidence of minimal retrolisthesis of C5 on C6 as described above. Assessment/Plan    ICD-10-CM ICD-9-CM    1. Radiculopathy of cervical spine  M54.12 723.4 MRI CERV SPINE W WO CONT   2. Other situational type phobia  F40.248 300.29 ALPRAZolam (XANAX) 1 mg tablet   3. Cervicalgia  M54.2 723.1    4. Chronic tension-type headache, intractable  G44.221 339.12    5. Vertigo  R42 780.4      Radiculopathy of cervical spine: present for months. Paresthesias in the C5 and C6 distribution. Cervical spine x-ray 07/2021 with minimal retrolisthesis if C5 on C6.  - Cervical spine MRI---sent Xanax prn for anxiety. - Continue Tylenol, Massage and heating pads prn  - Recommended PT outpatient    Vertigo- chronic, improving with Meclizine prn--has had work up in the past and has been seen Neurology and ENT, so far all work up has come back normal. Likely due to Meniere's disease.   - Has follow upcoming follow up with Neurology. - Continue Meclizine prn  - Discussed that she could be a good candidate for vestibular PT. Chronic tension-HA: improving since starting Imitrex. - Continue current treatment. I have discussed the diagnosis with the patient and the intended plan as seen in the above orders. Patient verbalized understanding of the plan and agrees with the plan. The patient has received an after-visit summary and questions were answered concerning future plans. I have discussed medication side effects and warnings with the patient as well. Informed patient to return to the office if new symptoms arise. Patient discussed with Dr. Power Mitchell.        Debbe Phalen, MD  Family Medicine Resident

## 2022-01-25 NOTE — PATIENT INSTRUCTIONS
Neck Spasm: Exercises  Introduction  Here are some examples of exercises for you to try. The exercises may be suggested for a condition or for rehabilitation. Start each exercise slowly. Ease off the exercises if you start to have pain. You will be told when to start these exercises and which ones will work best for you. How to do the exercises  Levator scapula stretch    1. Sit in a firm chair, or stand up straight. 2. Gently tilt your head toward your left shoulder. 3. Turn your head to look down into your armpit, bending your head slightly forward. Let the weight of your head stretch your neck muscles. 4. Hold for 15 to 30 seconds. 5. Return to your starting position. 6. Follow the same instructions above, but tilt your head toward your right shoulder. 7. Repeat 2 to 4 times toward each shoulder. Upper trapezius stretch    1. Sit in a firm chair, or stand up straight. 2. This stretch works best if you keep your shoulder down as you lean away from it. To help you remember to do this, start by relaxing your shoulders and lightly holding on to your thighs or your chair. 3. Tilt your head toward your shoulder and hold for 15 to 30 seconds. Let the weight of your head stretch your muscles. 4. If you would like a little added stretch, place your arm behind your back. Use the arm opposite of the direction you are tilting your head. For example, if you are tilting your head to the left, place your right arm behind your back. 5. Repeat 2 to 4 times toward each shoulder. Neck rotation    1. Sit in a firm chair, or stand up straight. 2. Keeping your chin level, turn your head to the right, and hold for 15 to 30 seconds. 3. Turn your head to the left, and hold for 15 to 30 seconds. 4. Repeat 2 to 4 times to each side. Chin tuck    1. Lie on the floor with a rolled-up towel under your neck. Your head should be touching the floor. 2. Slowly bring your chin toward the front of your neck.   3. Hold for a count of 6, and then relax for up to 10 seconds. 4. Repeat 8 to 12 times. Forward neck flexion    1. Sit in a firm chair, or stand up straight. 2. Bend your head forward. 3. Hold for 15 to 30 seconds, then return to your starting position. 4. Repeat 2 to 4 times. Follow-up care is a key part of your treatment and safety. Be sure to make and go to all appointments, and call your doctor if you are having problems. It's also a good idea to know your test results and keep a list of the medicines you take. Where can you learn more? Go to http://www.gray.com/  Enter P962 in the search box to learn more about \"Neck Spasm: Exercises. \"  Current as of: July 1, 2021               Content Version: 13.0  © 3765-0922 [employer]. Care instructions adapted under license by [employer] (which disclaims liability or warranty for this information). If you have questions about a medical condition or this instruction, always ask your healthcare professional. [address] any warranty or liability for your use of this information.

## 2022-01-26 PROBLEM — M54.2 CERVICALGIA: Status: ACTIVE | Noted: 2022-01-26

## 2022-01-26 PROBLEM — F41.9 ANXIETY: Status: ACTIVE | Noted: 2022-01-26

## 2022-01-26 PROBLEM — M54.12 RADICULOPATHY OF CERVICAL SPINE: Status: ACTIVE | Noted: 2022-01-26

## 2022-02-01 ENCOUNTER — TELEPHONE (OUTPATIENT)
Dept: FAMILY MEDICINE CLINIC | Age: 57
End: 2022-02-01

## 2022-02-01 RX ORDER — MECLIZINE HCL 12.5 MG 12.5 MG/1
12.5 TABLET ORAL
Qty: 30 TABLET | Refills: 0 | Status: SHIPPED | OUTPATIENT
Start: 2022-02-01 | End: 2022-02-11

## 2022-02-01 NOTE — TELEPHONE ENCOUNTER
----- Message from Jame Enciso sent at 1/29/2022  3:08 PM EST -----  Subject: Refill Request    QUESTIONS  Name of Medication? meclizine (ANTIVERT) 12.5 mg tablet  Patient-reported dosage and instructions? Take 1 Tablet by mouth three (3)   times daily as needed for Dizziness for up to 10 days. How many days do you have left? 0  Preferred Pharmacy? [facility]Y #[id number]  Pharmacy phone number (if available)? [phone]  ---------------------------------------------------------------------------  --------------  Erika PICKERING  What is the best way for the office to contact you? OK to leave message on   voicemail  Preferred Call Back Phone Number?  [phone]

## 2022-02-07 ENCOUNTER — PATIENT MESSAGE (OUTPATIENT)
Dept: FAMILY MEDICINE CLINIC | Age: 57
End: 2022-02-07

## 2022-02-08 ENCOUNTER — PATIENT MESSAGE (OUTPATIENT)
Dept: FAMILY MEDICINE CLINIC | Age: 57
End: 2022-02-08

## 2022-02-09 ENCOUNTER — OFFICE VISIT (OUTPATIENT)
Dept: NEUROLOGY | Age: 57
End: 2022-02-09
Payer: MEDICAID

## 2022-02-09 VITALS
DIASTOLIC BLOOD PRESSURE: 70 MMHG | HEIGHT: 67 IN | OXYGEN SATURATION: 99 % | BODY MASS INDEX: 23.23 KG/M2 | RESPIRATION RATE: 16 BRPM | HEART RATE: 78 BPM | WEIGHT: 148 LBS | SYSTOLIC BLOOD PRESSURE: 120 MMHG

## 2022-02-09 DIAGNOSIS — R42 DIZZINESS: ICD-10-CM

## 2022-02-09 DIAGNOSIS — G43.709 CHRONIC MIGRAINE WITHOUT AURA WITHOUT STATUS MIGRAINOSUS, NOT INTRACTABLE: Primary | ICD-10-CM

## 2022-02-09 DIAGNOSIS — H91.92 HEARING LOSS OF LEFT EAR, UNSPECIFIED HEARING LOSS TYPE: ICD-10-CM

## 2022-02-09 DIAGNOSIS — R42 VERTIGO: ICD-10-CM

## 2022-02-09 PROCEDURE — 99205 OFFICE O/P NEW HI 60 MIN: CPT | Performed by: PSYCHIATRY & NEUROLOGY

## 2022-02-09 RX ORDER — TOPIRAMATE 50 MG/1
50 TABLET, FILM COATED ORAL 2 TIMES DAILY
Qty: 60 TABLET | Refills: 5 | Status: SHIPPED | OUTPATIENT
Start: 2022-02-09 | End: 2022-06-08

## 2022-02-09 RX ORDER — TOPIRAMATE 100 MG/1
TABLET, FILM COATED ORAL
COMMUNITY
Start: 2022-01-26 | End: 2022-02-09

## 2022-02-09 NOTE — PROGRESS NOTES
Chief Complaint   Patient presents with    New Patient     patient was referred by Dr Noe Zavala, for vertigo which is constant and tinnitus, patient states she saw Dr Tess Foster ENT and she did not have a problem that he could help her with, for her to see neuro, patient also states she has severe motion sickness     patient is accompanied by her son       Visit Vitals  /70   Pulse 78   Resp 16   Ht 5' 7\" (1.702 m)   Wt 67.1 kg (148 lb)   SpO2 99%   BMI 23.18 kg/m²

## 2022-02-09 NOTE — LETTER
2/9/2022    Patient: Marchia Aase   YOB: 1965   Date of Visit: 2/9/2022     Aakash José, [address] [facility] [address]  [address] [place] [address] Road[address], MD  [facility] [address]  [address] [facility] [address]    Dear MD Terrie Tariq MD,      Thank you for referring Ms. Carmen Vu to [facility] for evaluation. My notes for this consultation are attached. If you have questions, please do not hesitate to call me. I look forward to following your patient along with you.       Sincerely,    Bella Sherwood MD

## 2022-02-09 NOTE — PROGRESS NOTES
Marchia Aase (1965) is a 64 y.o. female, new patient, here for evaluation of the following     Chief complaint(s):   Chief Complaint   Patient presents with    New Patient     patient was referred by Dr Roxane Vu, for vertigo which is constant and tinnitus, patient states she saw Dr Roosevelt Chow ENT and she did not have a problem that he could help her with, for her to see neuro, patient also states she has severe motion sickness       HPI: 64 y.o. female  has a past medical history of Asthma, Bilateral breast cysts (02/2021), Bronchiectasis, Bronchiectasis ([facility] [address] (12/2019), Bruxism, Chronic bronchitis, Hypertension, Presbyopia of both eyes (01/2019), Uterine myoma (12/2020), and Vertigo (3/2014). Referred for evaluation of vertigo, tinnitus, migraine with vertigo    1) Vertigo, dizziness: reports her first attack of vertigo was in 2014 and after that she developed chronic, episodic dizziness. Says she had brain MRI done at [facility] in 2014 to evaluate for her vertigo/dizziness and that was a normal study. She reports that she was living in [facility] in the early part of 2021 and went to see an ENT who specialized in balance disorders there (actually in [facility]). She says that she had audiogram which showed mild reduced hearing on the left, she also says she had a \"VNG. \"  She cannot tell me the specific results of the VNG but says that the ENT had diagnosed her with \"severe motion sickness,\" and \"possibly Ménière's. \"  She reports seeing ENT here in [place] (Dr. Karen Gurrola) who did physical exam, didn't find anything wrong, said couldn't help her and to see Neurologist.     2) Chronic migraine: Reports having chronic migraines dating back first 2010. Says she had MRI brain at that time which was normal.  Ports going to [facility] ER sometime recent for an episode of vertigo.   Was seen by neurologist there who put her on Topamax 100 mg a.m. 50 mg p.m. due to her complaint of chronic migraine. She says this has not reduced the incidence of her dizziness/vertigo. She says she has very few headaches since being on Topamax. She says the dizziness/vertigo seems to be increased and she is also having tingling in the hands and feet since being on Topamax. She has tried sumatriptan as a migraine pain reliever and says it did not help, has not had to use any acute migraine pain reliever recently as she is having very few headache since being on the Topamax    Reviewed labs dated 6/29/2021: BMP showed mild low potassium, mild low creatinine, normal GFR. A1c was 5.2. Lipid panel was normal.  CBC was normal.    Brain MRIs done in the past for dizziness/ vertigo was reportedly normal  CT head done on 9- for chronic headache was normal  CT head done on 3- for dizziness was normal      Review of Systems: Constipation, frequent headaches, high blood pressure, movement disorder, nausea vomiting, vision problem, poor appetite.    =================================================    Allergies   Allergen Reactions    Codeine Rash and Itching       Current Outpatient Medications   Medication Sig Dispense Refill    topiramate (TOPAMAX) 50 mg tablet Take 1 Tablet by mouth two (2) times a day. To reduce migraine frequency. 60 Tablet 5    ubrogepant (Ubrelvy) 100 mg tablet Take one tablet at onset of migraine. 1 Tablet 0    meclizine (ANTIVERT) 12.5 mg tablet TAKE 1 TABLET BY MOUTH THREE (3) TIMES DAILY AS NEEDED FOR DIZZINESS FOR UP TO 10 DAYS. 30 Tablet 0    ondansetron (ZOFRAN ODT) 4 mg disintegrating tablet Take 1 Tablet by mouth every eight (8) hours as needed for Nausea or Vomiting. 30 Tablet 0    SUMAtriptan (IMITREX) 50 mg tablet       losartan (COZAAR) 50 mg tablet Take 1 Tablet by mouth daily. 90 Tablet 5    mometasone (Asmanex Twisthaler) 220 mcg/ actuation (120) aepb inhaler Take 2 Puffs by inhalation two (2) times a day.  1 Each 5    triamcinolone (Nasacort) 55 mcg nasal inhaler 2 Sprays by Both Nostrils route daily. 1 Each 5    levonorgestreL (KYLEENA) 17.5 mcg/24 hrs (5 yrs) 19.5 mg IUD IUD 1 Device by IntraUTERine route once.  montelukast (SINGULAIR) 10 mg tablet Take 10 mg by mouth daily.  metoprolol tartrate (LOPRESSOR) 100 mg IR tablet Take 25 mg by mouth daily.  fluticasone propionate (FLONASE) 50 mcg/actuation nasal spray 2 Sprays by Both Nostrils route daily.  ipratropium (ATROVENT HFA) 17 mcg/actuation inhaler 20 mcg by Other route every six (6) hours as needed for Wheezing.  fish oil-omega-3 fatty acids 340-1,000 mg capsule Take 1 Capsule by mouth daily.  cholecalciferol (Vitamin D3) (1000 Units /25 mcg) tablet Take 5,000 Units by mouth daily.  amLODIPine (NORVASC) 2.5 mg tablet TAKE 1 TABLET BY MOUTH EVERY DAY (Patient taking differently: Take 2.5 mg by mouth daily.) 30 Tab 3       Past Medical History:   Diagnosis Date    Asthma     Bilateral breast cysts 2021    Bronchiectasis     Bronchiectasis ([address] 2019    Bruxism     Chronic bronchitis     Hypertension     Presbyopia of both eyes 2019    Uterine myoma 2020    Vertigo 3/2014       Past Surgical History:   Procedure Laterality Date    HX BACK SURGERY      HX  SECTION  1990    x1 w/ twins    HX LOBECTOMY      RML       family history includes Glaucoma in her sister; Hypertension in her mother and sister. reports that she has never smoked. She has never used smokeless tobacco. She reports that she does not drink alcohol and does not use drugs. PHYSICAL EXAM    Vitals:    22 0952   BP: 120/70   Pulse: 78   Resp: 16   Height: 5' 7\" (1.702 m)   Weight: 67.1 kg (148 lb)   SpO2: 99%       General: Awake, alert, conversant. EOMI. Visual fields normal bilateral.  Speech is normal.  There is no dysarthria, there is no aphasia. She is oriented to person place and situation. Pupils (II): equal, round. Funduscopic: not examined.   Extraocular movements (III, IV, VI): conjugate in all directions, no ANALISA. Ptosis (III, VII): none. Facial Sensation (V): intact to LT on both sides. Facial Movements (VII): symmetric at rest and on activation. Hearing (VIII): normal.  Soft palate elevation (IX): symmetric, no droop. Shoulder shrug (XI): symmetric, strong. Tongue protrusion (XII): midline    Motor: 5 out of 5 in all extremities. Sensory: Intact light touch in all extremities. Cerebellar: No resting or postural tremors. Deep Tendon Reflexes: 1+ symmetric throughout. Plantar response: Not examined. Gait: Normal including tandem. Romberg: Negative      ==================================================    ASSESSMENT/ PLAN:       ICD-10-CM ICD-9-CM    1. Chronic migraine without aura without status migrainosus, not intractable  G43.709 346.70 topiramate (TOPAMAX) 50 mg tablet      ubrogepant (Ubrelvy) 100 mg tablet   2. Dizziness  R42 780.4    3. Vertigo  R42 780.4    4. Hearing loss of left ear, unspecified hearing loss type  H91.92 389.9           D/w patient-Son that 1) her dizziness is not due to her migraines (not vestibular migraine) and 2) she should ask PCP to send her to [address] [place] [address] ENT (Neuro-Otologist preferably) for her report of possible Menière's and chronic dizziness/ vertigo. Advised her to take the records from the Neuro-Otologist / ENT she saw in [facility] so ENT at [address] [place] [address] has those records to review. Advised pt to reduce topamax to 50 mg BID for c/o increased dizziness and paresthesias  Gave sample of Ubrelvy 100 mg tablet for patient to try for next migraine headache    F/u in 2 months regarding migraine management        An electronic signature was used to authenticate this note.   -- Indu Bryant MD

## 2022-02-09 NOTE — PATIENT INSTRUCTIONS
Reduce Topamax to 50 mg twice a day    Gave you a sample of Ubrelvy to try if / when you have another migraine day. Ask PCP for referral to [facility] ENT to evaluate for your complaints of chronic dizziness and possible Meniere's. You must take your previous ENT records (from [facility]) to the [facility] ENT's office so they have something to review.

## 2022-02-14 ENCOUNTER — HOSPITAL ENCOUNTER (OUTPATIENT)
Dept: MRI IMAGING | Age: 57
Discharge: HOME | End: 2022-02-14
Attending: STUDENT IN AN ORGANIZED HEALTH CARE EDUCATION/TRAINING PROGRAM
Payer: MEDICAID

## 2022-02-14 DIAGNOSIS — M54.12 RADICULOPATHY OF CERVICAL SPINE: ICD-10-CM

## 2022-02-14 PROCEDURE — 72141 MRI NECK SPINE W/O DYE: CPT

## 2022-02-14 PROCEDURE — 74011250636 HC RX REV CODE- 250/636: Performed by: STUDENT IN AN ORGANIZED HEALTH CARE EDUCATION/TRAINING PROGRAM

## 2022-02-14 PROCEDURE — A9576 INJ PROHANCE MULTIPACK: HCPCS | Performed by: STUDENT IN AN ORGANIZED HEALTH CARE EDUCATION/TRAINING PROGRAM

## 2022-02-14 RX ADMIN — GADOTERIDOL 14 ML: 279.3 INJECTION, SOLUTION INTRAVENOUS at 13:32

## 2022-02-18 ENCOUNTER — TRANSCRIBE ORDER (OUTPATIENT)
Dept: SCHEDULING | Age: 57
End: 2022-02-18

## 2022-02-18 DIAGNOSIS — R11.0 NAUSEA: ICD-10-CM

## 2022-02-18 DIAGNOSIS — K59.00 CONSTIPATION: Primary | ICD-10-CM

## 2022-03-04 ENCOUNTER — HOSPITAL ENCOUNTER (OUTPATIENT)
Dept: ULTRASOUND IMAGING | Age: 57
Discharge: HOME | End: 2022-03-04
Attending: NURSE PRACTITIONER
Payer: MEDICAID

## 2022-03-04 DIAGNOSIS — K59.00 CONSTIPATION: ICD-10-CM

## 2022-03-04 DIAGNOSIS — R11.0 NAUSEA: ICD-10-CM

## 2022-03-04 PROCEDURE — 76700 US EXAM ABDOM COMPLETE: CPT

## 2022-03-07 ENCOUNTER — TELEPHONE (OUTPATIENT)
Dept: FAMILY MEDICINE CLINIC | Age: 57
End: 2022-03-07

## 2022-03-07 ENCOUNTER — VIRTUAL VISIT (OUTPATIENT)
Dept: FAMILY MEDICINE CLINIC | Age: 57
End: 2022-03-07
Payer: MEDICAID

## 2022-03-07 ENCOUNTER — NURSE TRIAGE (OUTPATIENT)
Dept: OTHER | Facility: CLINIC | Age: 57
End: 2022-03-07

## 2022-03-07 DIAGNOSIS — E87.6 HYPOKALEMIA: ICD-10-CM

## 2022-03-07 DIAGNOSIS — R42 DIZZINESS: ICD-10-CM

## 2022-03-07 DIAGNOSIS — R53.83 FATIGUE, UNSPECIFIED TYPE: ICD-10-CM

## 2022-03-07 DIAGNOSIS — R42 VERTIGO: Primary | ICD-10-CM

## 2022-03-07 DIAGNOSIS — H93.19 TINNITUS, UNSPECIFIED LATERALITY: ICD-10-CM

## 2022-03-07 PROCEDURE — 99214 OFFICE O/P EST MOD 30 MIN: CPT | Performed by: FAMILY MEDICINE

## 2022-03-07 NOTE — PROGRESS NOTES
Tomer Myers  64 y.o. female  1965  [address] [address]  [id number]   Karen Duran MD       Encounter Date and Time: March 7, 2022 at 1:28 PM       Tomer Myers, who was evaluated through a synchronous (real-time) audio-video encounter, and/or her healthcare decision maker, is aware that it is a billable service, which includes applicable co-pays, with coverage as determined by her insurance carrier. She provided verbal consent to proceed and patient identification was verified. This visit was conducted pursuant to the emergency declaration under the [facility], [address] [place] [address] authority and the [facility] [address] and [place] [employer] Act. A caregiver was present when appropriate. Ability to conduct physical exam was limited. The patient was located at home in a state where the provider was licensed to provide care. --Karen Duran MD on 3/7/2022 at 1:28 PM           Chief Complaint   Patient presents with    Dizziness    Fatigue     History of Present Illness   Tomer Myers is a 64 y.o. female was evaluated by synchronous (real-time) audio-video technology from home, through a secure patient portal.    Tomer Myers is a 64 y.o. female here today to address the following issues:  Chief Complaint   Patient presents with    Dizziness    Fatigue       Onset: 2014 dizziness and right ear tinnitis and fullness also for years. Was recommended to go to [facility] from her neurologist, Dr. Melyssa Daley. Was asked to go to PCP to get a referral.  Also with some fatigue. On and off finger numbness for about a year. Currently no symptoms.      Review of Systems   ROS    Vitals/Objective:     General: alert, cooperative, no distress   Mental  status: mental status: alert, oriented to person, place, and time, normal mood, behavior, speech, dress, motor activity, and thought processes   Resp: resp: normal effort and no respiratory distress   Neuro: neuro: no gross deficits   Skin: skin: no discoloration or lesions of concern on visible areas   Due to this being a TeleHealth evaluation, many elements of the physical examination are unable to be assessed. Assessment and Plan:       1. Vertigo  - REFERRAL TO ENT-OTOLARYNGOLOGY    2. Tinnitus, unspecified laterality  - REFERRAL TO ENT-OTOLARYNGOLOGY    3. Dizziness  - REFERRAL TO ENT-OTOLARYNGOLOGY    4. Fatigue, unspecified type  - METABOLIC PANEL, COMPREHENSIVE; Future  - CBC WITH AUTOMATED DIFF; Future  - TSH 3RD GENERATION; Future    5. Hypokalemia  - METABOLIC PANEL, COMPREHENSIVE; Future    Patient will need to be seen by Sports Medicine to clinically correlate her symptoms to her imaging of her C-spine. Refer to [address] [place] [address] ENT per Neuro. Also will get basic labs. K+ was low when last checks. Last preventive visit 1/2017. Will need appt for this. Time spent in direct conversation with the patient to include medical condition(s) discussed, assessment and treatment plan:    Patient encounter was >30 minutes was spent of total time spent on the date of the encounter: preparing to see the patient(reviewing prior notes and tests), obtaining and/or reviewing separately obtained history, performing a medially appropriate examination and/or evaluation, counseling and educating the patient, ordering tests, documenting clinical information in the electronic or other health record, independently interpreting results(not separately reported) and communicating results to the patient and care coordination(not separately reported) for follow up. We discussed the expected course, resolution and complications of the diagnosis(es) in detail. Medication risks, benefits, costs, interactions, and alternatives were discussed as indicated. I advised her to contact the office if her condition worsens, changes or fails to improve as anticipated.  She expressed understanding with the diagnosis(es) and plan. Patient understands that this encounter was a temporary measure, and the importance of further follow up and examination was emphasized. Patient verbalized understanding. Patient informed to follow up: Follow-up and Dispositions  ·   Return in about 1 week (around 3/14/2022) for Sports Medicine visit and needs separate AWV as well. .         Electronically Signed: Maggy Ardon MD    CPT Codes 19560-72191 for Established Patients may apply to this Telehealth Visit. POS code: 18. Modifier GT    Briseyda Sanchez is a 64 y.o. female who was evaluated by an audio-video encounter for concerns as above. Patient identification was verified prior to start of the visit. A caregiver was present when appropriate. Due to this being a TeleHealth encounter (During [place] [address] public health emergency), evaluation of the following organ systems was limited: Vitals/Constitutional/EENT/Resp/CV/GI//MS/Neuro/Skin/Heme-Lymph-Imm. Pursuant to the emergency declaration under the [facility], [facility] waiver authority and the [employer] and [employer] Act, this Virtual Visit was conducted, with patient's (and/or legal guardian's) consent, to reduce the patient's risk of exposure to COVID-19 and provide necessary medical care. Services were provided through a synchronous discussion virtually to substitute for in-person clinic visit. I was at home. The patient was at home. History   Patients past medical, surgical and family histories were reviewed and updated.       Past Medical History:   Diagnosis Date    Asthma     Bilateral breast cysts 02/2021    Bronchiectasis     Bronchiectasis ([address] 12/2019    Bruxism     Chronic bronchitis     Hypertension     Presbyopia of both eyes 01/2019    Uterine myoma 12/2020    Vertigo 3/2014     Past Surgical History:   Procedure Laterality Date    HX BACK SURGERY  2006   [address] [place] x1 w/ twins    [facility]X LOBECTOMY  1996    [facility]     Family History   Problem Relation Age of Onset   Kate Taveras Hypertension Mother     Glaucoma Sister     Hypertension Sister      Social History     Tobacco Use    Smoking status: Never Smoker    Smokeless tobacco: Never Used   Substance Use Topics    Alcohol use: No    Drug use: No     Patient Active Problem List   Diagnosis Code    [place] syndrome E80.4    Hepatitis A B15.9    Chronic tension headaches G44.229    Insomnia G47.00    DDD (degenerative disc disease) [id number]    HTN (hypertension) I10    Bronchiectasis (HCC) J47.9    Vertigo R42    Health care maintenance Z00.00    Hypokalemia E87.6    Stress F43.9    Muscle spasm M62.838    Migraine with vertigo G43. 109    Cervicalgia M54.2    Anxiety F41.9    Radiculopathy of cervical spine M54.12          Current Medications/Allergies   Medications and Allergies reviewed:    Current Outpatient Medications   Medication Sig Dispense Refill    topiramate (TOPAMAX) 50 mg tablet Take 1 Tablet by mouth two (2) times a day. To reduce migraine frequency. 60 Tablet 5    ubrogepant (Ubrelvy) 100 mg tablet Take one tablet at onset of migraine. 1 Tablet 0    ondansetron (ZOFRAN ODT) 4 mg disintegrating tablet Take 1 Tablet by mouth every eight (8) hours as needed for Nausea or Vomiting. 30 Tablet 0    SUMAtriptan (IMITREX) 50 mg tablet       losartan (COZAAR) 50 mg tablet Take 1 Tablet by mouth daily. 90 Tablet 5    mometasone (Asmanex Twisthaler) 220 mcg/ actuation (120) aepb inhaler Take 2 Puffs by inhalation two (2) times a day. 1 Each 5    triamcinolone (Nasacort) 55 mcg nasal inhaler 2 Sprays by Both Nostrils route daily. 1 Each 5    levonorgestreL (KYLEENA) 17.5 mcg/24 hrs (5 yrs) 19.5 mg IUD IUD 1 Device by IntraUTERine route once.  montelukast (SINGULAIR) 10 mg tablet Take 10 mg by mouth daily.  metoprolol tartrate (LOPRESSOR) 100 mg IR tablet Take 25 mg by mouth daily.       fluticasone propionate (FLONASE) 50 mcg/actuation nasal spray 2 Sprays by Both Nostrils route daily.  ipratropium (ATROVENT HFA) 17 mcg/actuation inhaler 20 mcg by Other route every six (6) hours as needed for Wheezing.  fish oil-omega-3 fatty acids 340-1,000 mg capsule Take 1 Capsule by mouth daily.  cholecalciferol (Vitamin D3) (1000 Units /25 mcg) tablet Take 5,000 Units by mouth daily.       amLODIPine (NORVASC) 2.5 mg tablet TAKE 1 TABLET BY MOUTH EVERY DAY (Patient taking differently: Take 2.5 mg by mouth daily.) 30 Tab 3     Allergies   Allergen Reactions    Codeine Rash and Itching

## 2022-03-07 NOTE — TELEPHONE ENCOUNTER
Received call from [place] at [facility] with Red Flag Complaint. Subjective: Caller states \"dizziness/fatigue\"     Current Symptoms:   Dizziness-feels she wants to faint so she goes to bed  Fatigue-going to the bathroom wears her out   Vertigo attacks-not often, last time was August   Numbness pain in neck down to fingers not new, MRI done  Dizziness feeling is increased  Full ears  Lightheadedness  Ears ringing  Blurry eyes  Migraine   Her neurologist wants her to see an Otologist    Onset:  The last few weeks; worsening    Associated Symptoms: NA    Pain Severity: denies    Temperature:  denies    What has been tried: meclizine and zofran, nothing is helping    LMP: NA Pregnant: NA    Recommended disposition: Go to [facility], advised caller if unable to get an appointment in the suggested time frame to go to an [facility] or ED, caller agreeable. Care advice provided, patient verbalizes understanding; denies any other questions or concerns; instructed to call back for any new or worsening symptoms. Patient/Caller agrees with recommended disposition; writer provided warm transfer to [place] at [facility] for appointment scheduling. Attention Provider: Thank you for allowing me to participate in the care of your patient. The patient was connected to triage in response to information provided to the [facility]. Please do not respond through this encounter as the response is not directed to a shared pool.     Reason for Disposition   Lightheadedness (dizziness) present now, after 2 hours of rest and fluids    Protocols used: DIZZINESS-ADULT-[place]

## 2022-03-18 PROBLEM — M54.12 RADICULOPATHY OF CERVICAL SPINE: Status: ACTIVE | Noted: 2022-01-26

## 2022-03-18 PROBLEM — G43.109 MIGRAINE WITH VERTIGO: Status: ACTIVE | Noted: 2021-08-25

## 2022-03-19 PROBLEM — M62.838 MUSCLE SPASM: Status: ACTIVE | Noted: 2021-08-25

## 2022-03-19 PROBLEM — M54.2 CERVICALGIA: Status: ACTIVE | Noted: 2022-01-26

## 2022-03-19 PROBLEM — F43.9 STRESS: Status: ACTIVE | Noted: 2021-08-25

## 2022-03-20 PROBLEM — F41.9 ANXIETY: Status: ACTIVE | Noted: 2022-01-26

## 2022-03-20 PROBLEM — E87.6 HYPOKALEMIA: Status: ACTIVE | Noted: 2021-07-06

## 2022-03-22 ENCOUNTER — TELEPHONE (OUTPATIENT)
Dept: FAMILY MEDICINE CLINIC | Age: 57
End: 2022-03-22

## 2022-03-22 NOTE — TELEPHONE ENCOUNTER
Received in basket from Dr. Diogo Schmidt:    schedule an in person Sports Medicine appt with one of our fellows. Renae Og will also need a separate appt with one of our family medicine residents she has seen in the past for her AWV and labs. Lauren Spindle you! Pt is scheduled with Dr. Maya Orourke this weeks. Called her to schedule her AWV with Dr. Seun Fernandez but her voicemail had a different name on it. Will schedule her AWV when she comes in for her [facility] appt.

## 2022-03-25 ENCOUNTER — OFFICE VISIT (OUTPATIENT)
Dept: FAMILY MEDICINE CLINIC | Age: 57
End: 2022-03-25
Payer: MEDICAID

## 2022-03-25 VITALS
RESPIRATION RATE: 16 BRPM | BODY MASS INDEX: 22.8 KG/M2 | WEIGHT: 145.6 LBS | DIASTOLIC BLOOD PRESSURE: 77 MMHG | SYSTOLIC BLOOD PRESSURE: 137 MMHG | HEART RATE: 62 BPM | OXYGEN SATURATION: 97 %

## 2022-03-25 DIAGNOSIS — M54.2 CERVICALGIA: Primary | ICD-10-CM

## 2022-03-25 DIAGNOSIS — M62.838 TRAPEZIUS MUSCLE SPASM: ICD-10-CM

## 2022-03-25 PROCEDURE — 99213 OFFICE O/P EST LOW 20 MIN: CPT | Performed by: FAMILY MEDICINE

## 2022-03-25 NOTE — PROGRESS NOTES
[address]     Chief Complaint:   Chief Complaint   Patient presents with    Follow-up     worsening headaches and dizziness over the past 8-9 months. had MRI done, which showed slipped disc       SUBJECTIVE:  Michelle Canas is a 64 y.o. female who presents for:    Chronic Neck Pain:  - Followed by Luis for headaches  - MRI Cerc Spine on 1/25/2022  - Has PT for vertigo  - Would like for Neck pain  - Has not taken pain meds or home PT exercise (stopped home PT d/t concern for making it worse after MRI results)  - Reports previously used heat which helped but has not used since MRI  - Reports worsening of chronic pain since MRI, predominantly Left paraspinal  - Denies weight loss and fever/chills    PMHx:  Past Medical History:   Diagnosis Date    Asthma     Bilateral breast cysts 02/2021    Bronchiectasis     Bronchiectasis ([address] 12/2019    Bruxism     Chronic bronchitis     Hypertension     Presbyopia of both eyes 01/2019    Uterine myoma 12/2020    Vertigo 3/2014       Meds:   Current Outpatient Medications   Medication Sig Dispense Refill    topiramate (TOPAMAX) 50 mg tablet Take 1 Tablet by mouth two (2) times a day. To reduce migraine frequency. 60 Tablet 5    ubrogepant (Ubrelvy) 100 mg tablet Take one tablet at onset of migraine. 1 Tablet 0    ondansetron (ZOFRAN ODT) 4 mg disintegrating tablet Take 1 Tablet by mouth every eight (8) hours as needed for Nausea or Vomiting. 30 Tablet 0    SUMAtriptan (IMITREX) 50 mg tablet       losartan (COZAAR) 50 mg tablet Take 1 Tablet by mouth daily. 90 Tablet 5    mometasone (Asmanex Twisthaler) 220 mcg/ actuation (120) aepb inhaler Take 2 Puffs by inhalation two (2) times a day. 1 Each 5    triamcinolone (Nasacort) 55 mcg nasal inhaler 2 Sprays by Both Nostrils route daily. 1 Each 5    levonorgestreL (KYLEENA) 17.5 mcg/24 hrs (5 yrs) 19.5 mg IUD IUD 1 Device by IntraUTERine route once.       montelukast (SINGULAIR) 10 mg tablet Take 10 mg by mouth daily.  metoprolol tartrate (LOPRESSOR) 100 mg IR tablet Take 25 mg by mouth daily.  fluticasone propionate (FLONASE) 50 mcg/actuation nasal spray 2 Sprays by Both Nostrils route daily.  ipratropium (ATROVENT HFA) 17 mcg/actuation inhaler 20 mcg by Other route every six (6) hours as needed for Wheezing.  fish oil-omega-3 fatty acids 340-1,000 mg capsule Take 1 Capsule by mouth daily.  cholecalciferol (Vitamin D3) (1000 Units /25 mcg) tablet Take 5,000 Units by mouth daily.  amLODIPine (NORVASC) 2.5 mg tablet TAKE 1 TABLET BY MOUTH EVERY DAY (Patient taking differently: Take 2.5 mg by mouth daily.) 30 Tab 3       Allergies: Allergies   Allergen Reactions    Codeine Rash and Itching       Smoker:  Social History     Tobacco Use   Smoking Status Never Smoker   Smokeless Tobacco Never Used       ETOH:   Social History     Substance and Sexual Activity   Alcohol Use No       FH:   Family History   Problem Relation Age of Onset    Hypertension Mother    Andreea Coral Glaucoma Sister     Hypertension Sister        ROS:  General/Constitutional:   No fever, fatigue, weight loss or weight gain. Positive for Headache.      Eyes:   No visual changes      Ears:    No changes      Neck:   No pain, or limited movement     Cardiac:    No chest pain      Respiratory:   No cough or shortness of breath     GI:   No nausea/vomiting, diarrhea, abdominal pain       :   No dysuria or  hematuria    Neurological:   No problems with balance, or unilateral weakness     Skin: No rash     Physical Exam:  Visit Vitals  /77 (BP 1 Location: Left upper arm, BP Patient Position: Sitting, BP Cuff Size: Adult)   Pulse 62   Resp 16   Wt 145 lb 9.6 oz (66 kg)   SpO2 97%   BMI 22.80 kg/m²     MSK:    Posture: Normal   Deformity: None    ROM - Cervical spine:     Flexion: Normal     Extension: Normal     Lateral bending: Normal    Upper Ext: FROM bilaterally       Palpation:    C1  T1 tenderness: None    Trapezious tenderness: Mild L>R     Strength (0-5/5):    :   Left: 5/5  Right: 5/5    Wrist Extension:  Left: 5/5  Right: 5/5    Wrist Flexion:  Left: 5/5  Right: 5/5    Elbow Flextion: Left: 5/5  Right: 5/5    Elbow Extension:  Left: 5/5  Right: 5/5    Shoulder Flexion:  Left: 5/5  Right: 5/5    Shoulder Abduction:  Left: 5/5  Right: 5/5    Shouder Ext Rotation: Left: 5/5  Right: 5/5    Shoulder Int Rotation: Left: 5/5  Right: 5/5       Sensation: Intact, no deficits in the upper ext bilaterally. Special test:    Spurlings: Left: Negative  Right: Negative    Imaging:   MRI of cervical spine 2/14/22    IMPRESSION  Degenerative findings, detailed by level above, with reversal of cervical  lordosis centered at C5 and mild retrolisthesis at C5-6. Note C5-6 moderate disc  bulging with superimposed right lateral and left foraminal disc herniations,  mild-moderate canal stenosis without cord compression, severe bilateral  subarticular stenosis, and severe left and moderate right foraminal stenosis. Note also C4-5 severe left subarticular and foraminal stenosis related to  accentuated disc bulging and uncovertebral osteophytes. Assessment:    ICD-10-CM ICD-9-CM    1. Cervicalgia  M54.2 723.1    2. Trapezius muscle spasm  M62.838 728.85        Plan:  1. Home Exercise Program as per handout. 2. Ice 15 minutes, three times a day PRN and after exercise. Can alternate with heat for 15 minutes. Medications:    1. Naproxin (Aleve): 220mg 1-2 tablets twice a day PRN. 2. Acetaminophen (Tylenol):  500mg 1-2 tablets every 6 hours as needed for pain.     RTC: PRN

## 2022-03-25 NOTE — PROGRESS NOTES
Allie Roberts is a 64 y.o. female    Chief Complaint   Patient presents with    Follow-up     worsening headaches and dizziness over the past 8-9 months. had MRI done, which showed slipped disc       1. \"Have you been to the ER, urgent care clinic since your last visit? Hospitalized since your last visit? \" No    2. \"Have you seen or consulted any other health care providers outside of the [address] [place] [address] since your last visit? \" No     3. For patients aged 39-70: Has the patient had a colonoscopy / FIT/ Cologuard? Yes - Care Gap present. Most recent result on file      If the patient is female:    4. For patients aged 41-77: Has the patient had a mammogram within the past 2 years? Yes - Care Gap present. Most recent result on file      5. For patients aged 21-65: Has the patient had a pap smear? Yes - Care Gap present. Most recent result on file    Vitals:    03/25/22 1430   BP: 137/77   BP 1 Location: Left upper arm   BP Patient Position: Sitting   BP Cuff Size: Adult   Pulse: 62   Resp: 16   Weight: 145 lb 9.6 oz (66 kg)   SpO2: 97%             Health Maintenance Due   Topic Date Due    Colorectal Cancer Screening Combo  01/25/2018    Breast Cancer Screen Mammogram  02/09/2019    Shingrix Vaccine Age 50> (2 of 2) 08/24/2021    Flu Vaccine (1) 09/01/2021    COVID-19 Vaccine (3 - Booster for Pfizer series) 09/22/2021    Cervical cancer screen  02/13/2022         Medication Reconciliation completed, changes noted.   Please update medication list.

## 2022-04-07 ENCOUNTER — OFFICE VISIT (OUTPATIENT)
Dept: NEUROLOGY | Age: 57
End: 2022-04-07
Payer: MEDICAID

## 2022-04-07 ENCOUNTER — DOCUMENTATION ONLY (OUTPATIENT)
Dept: NEUROLOGY | Age: 57
End: 2022-04-07

## 2022-04-07 VITALS
HEART RATE: 67 BPM | BODY MASS INDEX: 23.42 KG/M2 | SYSTOLIC BLOOD PRESSURE: 130 MMHG | DIASTOLIC BLOOD PRESSURE: 80 MMHG | HEIGHT: 67 IN | WEIGHT: 149.2 LBS | OXYGEN SATURATION: 98 %

## 2022-04-07 DIAGNOSIS — G43.719 INTRACTABLE CHRONIC MIGRAINE WITHOUT AURA AND WITHOUT STATUS MIGRAINOSUS: Primary | ICD-10-CM

## 2022-04-07 DIAGNOSIS — M47.812 CERVICAL SPONDYLOSIS: ICD-10-CM

## 2022-04-07 DIAGNOSIS — M54.81 CERVICO-OCCIPITAL NEURALGIA: ICD-10-CM

## 2022-04-07 PROCEDURE — 99214 OFFICE O/P EST MOD 30 MIN: CPT | Performed by: PSYCHIATRY & NEUROLOGY

## 2022-04-07 RX ORDER — ERENUMAB-AOOE 70 MG/ML
70 INJECTION SUBCUTANEOUS
Qty: 1 EACH | Refills: 0 | Status: SHIPPED | COMMUNITY
Start: 2022-04-07 | End: 2022-05-09

## 2022-04-07 RX ORDER — ERENUMAB-AOOE 70 MG/ML
70 INJECTION SUBCUTANEOUS
Qty: 1 EACH | Refills: 0 | Status: SHIPPED | OUTPATIENT
Start: 2022-04-07 | End: 2022-05-09 | Stop reason: SDUPTHER

## 2022-04-07 NOTE — PROGRESS NOTES
Aneudy Griffith (1965) is a 64 y.o. female, established patient, here for evaluation of the following     Chief complaint(s):   Chief Complaint   Patient presents with    Follow-up    Headache       SUBJECTIVE/ OBJECTIVE:    HPI: 64 y.o. female      Pt says she saw [address] [place] [address] ENT, and she was told has she has hearing loss and was recommended to get bilateral hearing aids. Says she saw one ENT there that didn't think her dizziness/ vertigo was related to her hearing loss, and then saw another ENT there that was going to refer her to a specialist for her Tinnitus. Pt reduced her Topamax to 50 mg BID as directed (as she reported she worsened dizziness, and paresthesia from higher dose). Describes pain radiating from base of head up the back of head on both sides. Calls it severe. Had MRI C-spine recently that showed multilevel disc degeneration (C4-C5 to C6-C7), varying degrees of foraminal stenosis between these levels, no substantial facet arthropathy, mild to moderate spinal canal stenosis at C5-C6. # of headache days a week: 7    # of severe headache days a week: 6-7      ========================================    Brief Hx/ Prior Data:     See initial visit 2-9-2022 for detailed hx    Impression/ Plan from initial visit: D/w patient-Son that 1) her dizziness is not due to her migraines (not vestibular migraine) and 2) she should ask PCP to send her to [address] [place] [address] ENT (Neuro-Otologist preferably) for her report of possible Menière's and chronic dizziness/ vertigo. Advised her to take the records from the Neuro-Otologist / ENT she saw in [facility] so ENT at [address] [place] [address] has those records to review.      Advised pt to reduce topamax to 50 mg BID for c/o increased dizziness and paresthesias  Gave sample of Ubrelvy 100 mg tablet for patient to try for next migraine headache    F/u in 2 months regarding migraine management          Allergies   Allergen Reactions    Codeine Rash and Itching         Current Outpatient Medications:     erenumab-aooe (Aimovig Autoinjector) 70 mg/mL injection, 1 mL by SubCUTAneous route every thirty (30) days. Indications: migraine prevention, Disp: 1 Each, Rfl: 0    rimegepant (NURTEC) 75 mg disintegrating tablet, Take one tablet at onset of migraine. Limit use to one tablet per 24 hours. Limit use to 2 days a week., Disp: 2 Tablet, Rfl: 0    erenumab-aooe (Aimovig Autoinjector) 70 mg/mL injection, 1 mL by SubCUTAneous route every thirty (30) days. Indications: migraine prevention, Disp: 1 Each, Rfl: 0    topiramate (TOPAMAX) 50 mg tablet, Take 1 Tablet by mouth two (2) times a day. To reduce migraine frequency. , Disp: 60 Tablet, Rfl: 5    ondansetron (ZOFRAN ODT) 4 mg disintegrating tablet, Take 1 Tablet by mouth every eight (8) hours as needed for Nausea or Vomiting., Disp: 30 Tablet, Rfl: 0    losartan (COZAAR) 50 mg tablet, Take 1 Tablet by mouth daily. , Disp: 90 Tablet, Rfl: 5    mometasone (Asmanex Twisthaler) 220 mcg/ actuation (120) aepb inhaler, Take 2 Puffs by inhalation two (2) times a day., Disp: 1 Each, Rfl: 5    triamcinolone (Nasacort) 55 mcg nasal inhaler, 2 Sprays by Both Nostrils route daily. , Disp: 1 Each, Rfl: 5    levonorgestreL (KYLEENA) 17.5 mcg/24 hrs (5 yrs) 19.5 mg IUD IUD, 1 Device by IntraUTERine route once., Disp: , Rfl:     montelukast (SINGULAIR) 10 mg tablet, Take 10 mg by mouth daily. , Disp: , Rfl:     metoprolol tartrate (LOPRESSOR) 100 mg IR tablet, Take 25 mg by mouth daily. , Disp: , Rfl:     fluticasone propionate (FLONASE) 50 mcg/actuation nasal spray, 2 Sprays by Both Nostrils route daily. , Disp: , Rfl:     ipratropium (ATROVENT HFA) 17 mcg/actuation inhaler, 20 mcg by Other route every six (6) hours as needed for Wheezing., Disp: , Rfl:     cholecalciferol (Vitamin D3) (1000 Units /25 mcg) tablet, Take 5,000 Units by mouth daily. , Disp: , Rfl:     amLODIPine (NORVASC) 2.5 mg tablet, TAKE 1 TABLET BY MOUTH EVERY DAY (Patient taking differently: Take 2.5 mg by mouth daily.), Disp: 30 Tab, Rfl: 3    fish oil-omega-3 fatty acids 340-1,000 mg capsule, Take 1 Capsule by mouth daily. (Patient not taking: Reported on 2022), Disp: , Rfl:      has a past medical history of Asthma, Bilateral breast cysts (2021), Bronchiectasis, Bronchiectasis ([facility] [address] (2019), Bruxism, Chronic bronchitis, Hypertension, Presbyopia of both eyes (2019), Uterine myoma (2020), and Vertigo (3/2014). has a past surgical history that includes hx  section (); hx back surgery (); and hx lobectomy (). Physical Exam:    Vitals:    22 1043   BP: 130/80   Pulse: 67   Height: 5' 7\" (1.702 m)   Weight: 67.7 kg (149 lb 3.2 oz)   SpO2: 98%     No exam today  Entirety of visit spent discussing headache management  Pt was awake, resting, conversant    ========================================    ASSESSMENT/ PLAN:       ICD-10-CM ICD-9-CM    1. Intractable chronic migraine without aura and without status migrainosus  G43.719 346.71 erenumab-aooe (Aimovig Autoinjector) 70 mg/mL injection      rimegepant (NURTEC) 75 mg disintegrating tablet      erenumab-aooe (Aimovig Autoinjector) 70 mg/mL injection   2. Cervico-occipital neuralgia  M54.81 723.8    3. Cervical spondylosis  M47.812 721.0       Rx'd Aimovig 70 mg SQ once monthly (every 30 days) injector to reduce migraine frequency  Given one sample of this to get started with today/ tomorrow. Sent Rx to pharmacy  Given sample of Nurtec to try as acute migraine pain reliever (reports Sumatriptan and Ubrelvy didn't help)    D/w patient that if Aimovig doesn't reduce her headache frequency, another option would be bilateral occipital nerve blocks (explained what that was), if insurance would cover it. She expressed understanding. F/u in 6 weeks      An electronic signature was used to authenticate this note.   -- Sumanth Escalona MD

## 2022-04-07 NOTE — LETTER
4/7/2022    Patient: Sherley Serrato   YOB: 1965   Date of Visit: 4/7/2022     Gracie Henry, [address]  [place] [id number]  Via Fax: [phone]    Dear Gracie Henry MD,      Thank you for referring Ms. Gabriela Jenkins to [facility] for evaluation. My notes for this consultation are attached. If you have questions, please do not hesitate to call me. I look forward to following your patient along with you.       Sincerely,    Sumanth Escalona MD

## 2022-04-07 NOTE — PROGRESS NOTES
Pt was given a sample of Nurtec ODT 75mg at office visit  SCS:[id number]  Exp: 9/2024    Pt was also given Aimovig 70mg/mL at office visit  Lot: [id number]  Exp:5/2023

## 2022-04-19 DIAGNOSIS — G43.719 INTRACTABLE CHRONIC MIGRAINE WITHOUT AURA AND WITHOUT STATUS MIGRAINOSUS: Primary | ICD-10-CM

## 2022-04-19 NOTE — TELEPHONE ENCOUNTER
PT tried samples that were given called Jeimy Hutchinson 75mgs  Pt thinks it seems to be working well and want to have a prescription sent to the pharmacy

## 2022-04-20 RX ORDER — RIMEGEPANT SULFATE 75 MG/75MG
TABLET, ORALLY DISINTEGRATING ORAL
Qty: 8 TABLET | Refills: 3 | Status: SHIPPED | OUTPATIENT
Start: 2022-04-20 | End: 2022-05-11

## 2022-04-25 NOTE — PROGRESS NOTES
ROUTINE ANNUAL WELL WOMAN    Subjective   Winferd Kocher is a 64 y.o. female who presents to clinic today for annual physical exam.    She would also like to address the following issues:  None    Gyn History  No obstetric history on file. No LMP recorded. (Menstrual status: IUD). Placed December 2020. Mother had menses til 46 yo. Any family history of gyn cancers (breast, ovarian or cervical ca)? No    Do you feel safe at home? yes    Diet: healthy, fruits, veggies, meats    Exercise: walking everyday    Preventative care:  Last pap 2/8/2017 - NILM, HPV neg - due for pap with cotesting today. Has not had COVID booster and not planning on getting it. Had colonoscopy at age 48 with Dr. Cyrus Simms. Reported was normal. I do not see these results in our system. Declines 2nd shingles vaccine. Had neg Hep C testing in 2010. Never had HIV testing - will order today. Past Medical History  Past Medical History:   Diagnosis Date    Asthma     Bilateral breast cysts 02/2021    Bronchiectasis     Bronchiectasis ([address] 12/2019    Bruxism     Chronic bronchitis     Hypertension     Presbyopia of both eyes 01/2019    Uterine myoma 12/2020    Vertigo 3/2014       Current Medications   Current Outpatient Medications on File Prior to Visit   Medication Sig Dispense Refill    erenumab-aooe (Aimovig Autoinjector) 70 mg/mL injection 1 mL by SubCUTAneous route every thirty (30) days. Indications: migraine prevention 1 Each 0    topiramate (TOPAMAX) 50 mg tablet Take 1 Tablet by mouth two (2) times a day. To reduce migraine frequency. 60 Tablet 5    ondansetron (ZOFRAN ODT) 4 mg disintegrating tablet Take 1 Tablet by mouth every eight (8) hours as needed for Nausea or Vomiting. 30 Tablet 0    losartan (COZAAR) 50 mg tablet Take 1 Tablet by mouth daily. 90 Tablet 5    triamcinolone (Nasacort) 55 mcg nasal inhaler 2 Sprays by Both Nostrils route daily.  1 Each 5    levonorgestreL (KYLEENA) 17.5 mcg/24 hrs (5 yrs) 19.5 mg IUD IUD 1 Device by IntraUTERine route once.  montelukast (SINGULAIR) 10 mg tablet Take 10 mg by mouth daily.  metoprolol tartrate (LOPRESSOR) 100 mg IR tablet Take 25 mg by mouth daily.  fluticasone propionate (FLONASE) 50 mcg/actuation nasal spray 2 Sprays by Both Nostrils route daily.  ipratropium (ATROVENT HFA) 17 mcg/actuation inhaler 20 mcg by Other route every six (6) hours as needed for Wheezing.  fish oil-omega-3 fatty acids 340-1,000 mg capsule Take 1 Capsule by mouth daily.  cholecalciferol (Vitamin D3) (1000 Units /25 mcg) tablet Take 5,000 Units by mouth daily.  amLODIPine (NORVASC) 2.5 mg tablet TAKE 1 TABLET BY MOUTH EVERY DAY (Patient taking differently: Take 2.5 mg by mouth daily.) 30 Tab 3    rimegepant (Nurtec ODT) 75 mg disintegrating tablet Take one tablet at onset of migraine headache. Limit use to 2 days a week. Indications: a migraine headache (Patient not taking: Reported on 2022) 8 Tablet 3    rimegepant (NURTEC) 75 mg disintegrating tablet Take one tablet at onset of migraine. Limit use to one tablet per 24 hours. Limit use to 2 days a week. 2 Tablet 0    erenumab-aooe (Aimovig Autoinjector) 70 mg/mL injection 1 mL by SubCUTAneous route every thirty (30) days. Indications: migraine prevention 1 Each 0    mometasone (Asmanex Twisthaler) 220 mcg/ actuation (120) aepb inhaler Take 2 Puffs by inhalation two (2) times a day. (Patient not taking: Reported on 2022) 1 Each 5     No current facility-administered medications on file prior to visit.        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   Tobacco Use    Smoking status: Never Smoker    Smokeless tobacco: Never Used   Substance and Sexual Activity    Alcohol use: No    Drug use: No    Sexual activity: Yes     Partners: Male   Other Topics Concern     Service Not Asked    Blood Transfusions Not Asked    Caffeine Concern Not Asked    Occupational Exposure Not Asked    Hobby Hazards Not Asked    Sleep Concern Not Asked    Stress Concern Not Asked    Weight Concern Not Asked    Special Diet Not Asked    Back Care Not Asked    Exercise Yes     Comment: each day    Bike Helmet Not Asked    Seat Belt Not Asked    Self-Exams Not Asked   Social History Narrative    Not on file     Social Determinants of Health     Financial Resource Strain:     Difficulty of Paying Living Expenses: Not on file   Food Insecurity:     Worried About Running Out of Food in the Last Year: Not on file    Pierre of Food in the Last Year: Not on file   Transportation Needs:     Lack of Transportation (Medical): Not on file    Lack of Transportation (Non-Medical):  Not on file   Physical Activity:     Days of Exercise per Week: Not on file    Minutes of Exercise per Session: Not on file   Stress:     Feeling of Stress : Not on file   Social Connections:     Frequency of Communication with Friends and Family: Not on file    Frequency of Social Gatherings with Friends and Family: Not on file    Attends Islam Services: Not on file    Active Member of Clubs or Organizations: Not on file    Attends Club or Organization Meetings: Not on file    Marital Status: Not on file   Intimate Partner Violence:     Fear of Current or Ex-Partner: Not on file    Emotionally Abused: Not on file    Physically Abused: Not on file    Sexually Abused: Not on file Housing Stability:     Unable to Pay for Housing in the Last Year: Not on file    Number of Places Lived in the Last Year: Not on file    Unstable Housing in the Last Year: Not on file         Objective   Vital Signs  Visit Vitals  /80   Pulse 74   Temp 97.1 °F (36.2 °C) (Temporal)   Resp 17   Ht 5' 7\" (1.702 m)   Wt 142 lb (64.4 kg)   SpO2 96%   BMI 22.24 kg/m²       PHYSICAL EXAM   GEN: No apparent distress. Alert and oriented and responds to all questions appropriately. EYES:  Conjunctiva clear; pupils round and reactive to light; extraocular movements are intact. EAR: External ears are normal.  Tympanic membranes are clear and without effusion. NOSE: Turbinates are within normal limits. No drainage  OROPHYARYNX: No oral lesions or exudates. NECK:  Supple; no masses; thyroid normal           LUNGS: Respirations unlabored; clear to auscultation bilaterally  CARDIOVASCULAR: Regular, rate, and rhythm without murmurs, gallops or rubs   ABDOMEN: Soft; nontender; nondistended; normoactive bowel sounds; no masses or organomegaly  PELVIC: normal external genitalia, vulva, vagina, cervix, uterus and adnexa, exam chaperoned by JORDEN Cabezas  NEUROLOGIC: No focal neurologic deficits. Strength and sensation grossly intact. Coordination and gait grossly intact. EXT: Well perfused. No edema. SKIN: No obvious rashes  Assessment   Renetta Corley is a 64 y.o. female presenting to clinic today for routine annual exam.    Plan       ICD-10-CM ICD-9-CM    1. Well woman exam  Z01.419 V72.31    2. Screening mammogram for breast cancer  Z12.31 V76.12 [facility] MAMMO BI SCREENING INCL CAD   3. Screening for cervical cancer  Z12.4 V76.2 PAP IG, APTIMA HPV AND RFX 16/18,45 ([id number])      PAP IG, APTIMA HPV AND RFX 16/18,45 ([id number])   4.  Screening for HIV (human immunodeficiency virus)  Z11.4 V73.89 HIV 1/2 AG/AB, 4TH GENERATION,W RFLX CONFIRM     - f/u pap smear  - will send message to  to have patient scheduled for lab visit as she was not able to have labs done today d/t dizzy episode after sitting up after pap smear. Patient with known issues with this for 8+ years and followed by [address] [place] [address] neurology and currently doing vestibular rehab. Patient reports this episode is significantly improved to prior episodes and will have her daughter pick her up to drive her home. - Counseled re: diet, exercise, healthy lifestyle  - Return for yearly wellness visits    I discussed the aforementioned diagnoses with the patient as well as the plan of care. All questions were answered and an AVS was provided.      I discussed this patient with Dr. Kalyn Lopez (Attending Physician)      Signed By:  Connie Fatima DO

## 2022-04-26 ENCOUNTER — HOSPITAL ENCOUNTER (OUTPATIENT)
Dept: LAB | Age: 57
Discharge: HOME | End: 2022-04-26
Payer: MEDICAID

## 2022-04-26 ENCOUNTER — OFFICE VISIT (OUTPATIENT)
Dept: FAMILY MEDICINE CLINIC | Age: 57
End: 2022-04-26
Payer: MEDICAID

## 2022-04-26 VITALS
DIASTOLIC BLOOD PRESSURE: 80 MMHG | RESPIRATION RATE: 17 BRPM | HEART RATE: 74 BPM | WEIGHT: 142 LBS | TEMPERATURE: 97.1 F | HEIGHT: 67 IN | SYSTOLIC BLOOD PRESSURE: 134 MMHG | BODY MASS INDEX: 22.29 KG/M2 | OXYGEN SATURATION: 96 %

## 2022-04-26 DIAGNOSIS — Z12.4 SCREENING FOR CERVICAL CANCER: ICD-10-CM

## 2022-04-26 DIAGNOSIS — Z11.4 SCREENING FOR HIV (HUMAN IMMUNODEFICIENCY VIRUS): ICD-10-CM

## 2022-04-26 DIAGNOSIS — Z12.31 SCREENING MAMMOGRAM FOR BREAST CANCER: ICD-10-CM

## 2022-04-26 DIAGNOSIS — Z01.419 WELL WOMAN EXAM: Primary | ICD-10-CM

## 2022-04-26 PROCEDURE — 87624 HPV HI-RISK TYP POOLED RSLT: CPT

## 2022-04-26 PROCEDURE — 88175 CYTOPATH C/V AUTO FLUID REDO: CPT

## 2022-04-26 PROCEDURE — 99396 PREV VISIT EST AGE 40-64: CPT | Performed by: STUDENT IN AN ORGANIZED HEALTH CARE EDUCATION/TRAINING PROGRAM

## 2022-04-26 NOTE — PROGRESS NOTES
Chief Complaint   Patient presents with    Well Woman     1. Have you been to the ER, urgent care clinic since your last visit? Hospitalized since your last visit? Yes. Alexa Santiago for vertigo. 2. Have you seen or consulted any other health care providers outside of the [address] [place] [address] since your last visit? Include any pap smears or colon screening. No    Tylenol 500mg tabs 2 given at 11am per VO Dr. Armani France. Zofran 8mg tab one given sublingual at 11:40am per VO Dr. Armani France.

## 2022-04-27 ENCOUNTER — TELEPHONE (OUTPATIENT)
Dept: FAMILY MEDICINE CLINIC | Age: 57
End: 2022-04-27

## 2022-04-27 NOTE — TELEPHONE ENCOUNTER
Attempted to call pt about setting up a Lab appt, no answer, left vm to reach back and schedule over phone or can come in clinic during office hours to have them done.

## 2022-05-09 DIAGNOSIS — E87.6 HYPOKALEMIA: ICD-10-CM

## 2022-05-11 ENCOUNTER — TELEPHONE (OUTPATIENT)
Dept: NEUROLOGY | Age: 57
End: 2022-05-11

## 2022-05-11 RX ORDER — AMLODIPINE BESYLATE 2.5 MG/1
2.5 TABLET ORAL DAILY
Qty: 90 TABLET | Refills: 3 | Status: SHIPPED | OUTPATIENT
Start: 2022-05-11 | End: 2022-05-26 | Stop reason: SINTOL

## 2022-05-11 NOTE — TELEPHONE ENCOUNTER
Patient called very upset regarding medication for her migraines. She stated she was given samples the last time on her visit. Patient stated the medicine helped tremendously. She is currently out of all her medicine and her headaches are getting worse. When the patient called the pharmacy they stated the forms are not filled all the way out by the doctor. Patient not sure the name of the form needing to be filled out. Please contact.

## 2022-05-11 NOTE — TELEPHONE ENCOUNTER
Spoke with patient advised I had samples her at the office for her and I am sending the form for the nurtec, will ask PA coordinator about SANDY RICHARD

## 2022-05-23 ENCOUNTER — TELEPHONE (OUTPATIENT)
Dept: NEUROLOGY | Age: 57
End: 2022-05-23

## 2022-05-23 DIAGNOSIS — G43.719 INTRACTABLE CHRONIC MIGRAINE WITHOUT AURA AND WITHOUT STATUS MIGRAINOSUS: Primary | ICD-10-CM

## 2022-05-23 NOTE — TELEPHONE ENCOUNTER
Re: Natalie Muhammad from nurse, PA needed. Initiated in [facility], key#[id number]. -PA submitted. Awaiting update. Re: Dawn RICHARD request from [employer] portal. Initiated in [facility], key#[id number]. -PA submitted. Awaiting update.

## 2022-05-24 NOTE — TELEPHONE ENCOUNTER
Re: Theresa DU denied. Case# [id number]. Per letter:     Pt must have 'tried or cannot use two preferred drugs within the same class that are appropriate for your condition: Ajovy auto-injector, Ajovy prefilled syringe, Emgality, Emgality 300mg dose.'      Re: Nurtec    -PA denied. Case# [id number]. Per letter:     Pt must have 'tried or cannot use one preferred drug within the same class that are appropriate for your condition: Ubrelvy.'    Pt HAS tried/failed Sim Phenes and this was documented in progress notes submitted w/ PA. Calling [facility] [phone] to reconsider/appeal.     Spoke w/ [facility] rep, since Ubrelvy \"samples\" were tried/failed, this is not a valid trial and pt must be Rx'd med and then fail medcation. Per rep, can [facility] within 7 days of denial [phone]. Or Appeal but will likely be denied again. Sent nurse pool msg w/ updates.

## 2022-05-26 ENCOUNTER — VIRTUAL VISIT (OUTPATIENT)
Dept: FAMILY MEDICINE CLINIC | Age: 57
End: 2022-05-26
Payer: MEDICAID

## 2022-05-26 ENCOUNTER — TELEPHONE (OUTPATIENT)
Dept: NEUROLOGY | Age: 57
End: 2022-05-26

## 2022-05-26 DIAGNOSIS — H93.19 TINNITUS, UNSPECIFIED LATERALITY: ICD-10-CM

## 2022-05-26 DIAGNOSIS — R42 DIZZINESS: Primary | ICD-10-CM

## 2022-05-26 DIAGNOSIS — I10 HYPERTENSION, UNSPECIFIED TYPE: ICD-10-CM

## 2022-05-26 DIAGNOSIS — G43.109 MIGRAINE WITH VERTIGO: ICD-10-CM

## 2022-05-26 PROCEDURE — 99442 PR PHYS/QHP TELEPHONE EVALUATION 11-20 MIN: CPT | Performed by: FAMILY MEDICINE

## 2022-05-26 NOTE — PROGRESS NOTES
Ewelina Gustafson  64 y.o. female  1965  [address] [address]  [id number]   Samuel Fleming MD       Encounter Date and Time: May 26, 2022 at 10:46 AM       Ewelina Gustafson, who was evaluated through a synchronous (real-time) audio only encounter, and/or her healthcare decision maker, is aware that it is a billable service, which includes applicable co-pays, with coverage as determined by her insurance carrier. She provided verbal consent to proceed and patient identification was verified. This visit was conducted pursuant to the emergency declaration under the [facility], [address] [place] [address] authority and the [employer] and [employer] General Act. A caregiver was present when appropriate. Ability to conduct physical exam was limited. The patient was located at home in a state where the provider was licensed to provide care. --Samuel Fleming MD on 5/26/2022 at 10:46 AM           Chief Complaint   Patient presents with   John Corrine in [place] Light Sensitivity    Migraine     History of Present Illness   Ewelina Gustafson is a 64 y.o. female was evaluated by synchronous (real-time) audio-video technology from home, through a secure patient portal.    Onset: 2014 dizziness and right ear tinnitis and fullness also for years. Was recommended to go to [facility] from her neurologist, Dr. Nakul Blakely. Was asked to go to PCP to get a referral.  Also with some fatigue. Per Neuro, states migraine is different from ear symptoms and states [facility] told her it's vestibular migraines. Review of Systems   ROS    Vitals/Objective:   O: Patient speaking in complete sentences without effort. Normal speech and cooperative. Due to this being a TeleHealth evaluation, many elements of the physical examination are unable to be assessed. Assessment and Plan:       1. Dizziness    2. Tinnitus, unspecified laterality    3.  Migraine with vertigo    4. Hypertension, unspecified type    Stop amlodipine as this can be contributing to tinnitis. Will need in person appt in 1-2 weeks for BP check. She has not collected blood work I, nor, Dr. Joseline Tejada has ordered. Encouraged to do so. Time spent in direct conversation with the patient to include medical condition(s) discussed, assessment and treatment plan:    Patient encounter was >12 minutes of total time is spent on the date of the encounter: preparing to see the patient(reviewing prior notes and tests), obtaining and/or reviewing separately obtained history, performing a medially appropriate examination and/or evaluation, counseling and educating, discontinuing medication, documenting clinical information in the electronic or other health record, and care coordination(not separately reported) for follow up appt scheduling with our front office. We discussed the expected course, resolution and complications of the diagnosis(es) in detail. Medication risks, benefits, costs, interactions, and alternatives were discussed as indicated. I advised her to contact the office if her condition worsens, changes or fails to improve as anticipated. She expressed understanding with the diagnosis(es) and plan. Patient understands that this encounter was a temporary measure, and the importance of further follow up and examination was emphasized. Patient verbalized understanding. Patient informed to follow up: Follow-up and Dispositions  ·   Return in about 1 week (around 6/2/2022) for In person visit BP check and medication adjustment . Electronically Signed: Asia Doe MD    CPT Codes 40534-53933 for Established Patients may apply to this Telehealth Visit. POS code: 18. Modifier ROLO Weirelor is a 64 y.o. female who was evaluated by an audio only encounter for concerns as above. Patient identification was verified prior to start of the visit.  A caregiver was present when appropriate. Due to this being a TeleHealth encounter (During  public health emergency), evaluation of the following organ systems was limited: Vitals/Constitutional/EENT/Resp/CV/GI//MS/Neuro/Skin/Heme-Lymph-Imm. Pursuant to the emergency declaration under the [facility] [place] [address] [facility] authority and the [employer] Act, this Virtual Visit was conducted, with patient's (and/or legal guardian's) consent, to reduce the patient's risk of exposure to COVID-19 and provide necessary medical care. Services were provided through a synchronous discussion virtually to substitute for in-person clinic visit. I was at home. The patient was at home. History   Patients past medical, surgical and family histories were reviewed and updated.       Past Medical History:   Diagnosis Date    Asthma     Bilateral breast cysts 2021    Bronchiectasis     Bronchiectasis ([facility] [address] 2019    Bruxism     Chronic bronchitis     Hypertension     Presbyopia of both eyes 2019    Uterine myoma 2020    Vertigo 3/2014     Past Surgical History:   Procedure Laterality Date    HX BACK SURGERY      HX  SECTION  1990    x1 w/ twins    HX LOBECTOMY      RML     Family History   Problem Relation Age of Onset   Loraine Manual Hypertension Mother     Glaucoma Sister     Hypertension Sister      Social History     Tobacco Use    Smoking status: Never Smoker    Smokeless tobacco: Never Used   Substance Use Topics    Alcohol use: No    Drug use: No     Patient Active Problem List   Diagnosis Code    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 109    Cervicalgia M54.2    Anxiety F41.9    Radiculopathy of cervical spine M54.12          Current Medications/Allergies   Medications and Allergies reviewed:    Current Outpatient Medications   Medication Sig Dispense Refill    triamcinolone (Nasacort) 55 mcg nasal inhaler 2 Sprays by Both Nostrils route daily. 1 Each 5    rimegepant (Nurtec ODT) 75 mg disintegrating tablet Take one tablet at onset of migraine headache. Limit use to 2 days per week. Indications: a migraine headache 8 Tablet 3    erenumab-aooe (Aimovig Autoinjector) 70 mg/mL injection 1 mL by SubCUTAneous route every thirty (30) days. Indications: migraine prevention 1 Each 2    topiramate (TOPAMAX) 50 mg tablet Take 1 Tablet by mouth two (2) times a day. To reduce migraine frequency. 60 Tablet 5    ondansetron (ZOFRAN ODT) 4 mg disintegrating tablet Take 1 Tablet by mouth every eight (8) hours as needed for Nausea or Vomiting. 30 Tablet 0    losartan (COZAAR) 50 mg tablet Take 1 Tablet by mouth daily. 90 Tablet 5    mometasone (Asmanex Twisthaler) 220 mcg/ actuation (120) aepb inhaler Take 2 Puffs by inhalation two (2) times a day. (Patient not taking: Reported on 4/26/2022) 1 Each 5    levonorgestreL (KYLEENA) 17.5 mcg/24 hrs (5 yrs) 19.5 mg IUD IUD 1 Device by IntraUTERine route once.  montelukast (SINGULAIR) 10 mg tablet Take 10 mg by mouth daily.  metoprolol tartrate (LOPRESSOR) 100 mg IR tablet Take 25 mg by mouth daily.  fluticasone propionate (FLONASE) 50 mcg/actuation nasal spray 2 Sprays by Both Nostrils route daily.  ipratropium (ATROVENT HFA) 17 mcg/actuation inhaler 20 mcg by Other route every six (6) hours as needed for Wheezing.  fish oil-omega-3 fatty acids 340-1,000 mg capsule Take 1 Capsule by mouth daily.  cholecalciferol (Vitamin D3) (1000 Units /25 mcg) tablet Take 5,000 Units by mouth daily.        Allergies   Allergen Reactions    Codeine Rash and Itching w

## 2022-05-26 NOTE — TELEPHONE ENCOUNTER
Patient calling regarding the medication she is taking the side effect she is having is ringing in the ear (TOPIRRAMATE) the ringing in the ear is increasing. Patient stopped taking her blood pressure medicine please advise. Patient stated her insurance will not cover the injector she takes for her migraines and would like to know if there is a generic. Patient would like a call from the doctor or nurse to advise her.

## 2022-05-27 NOTE — TELEPHONE ENCOUNTER
Msg already previously left for the patient, awaiting for her to call back so we can send medication to pharmacy she prefers.

## 2022-05-27 NOTE — TELEPHONE ENCOUNTER
Called pt. Verified. Inform pt of Dr. Mitra Concepcion detailed message. Pt verbalizes understanding. Pt is asking if she should continue taking the Topamax? She states that she spoke with someone and they states that the tinnitus is a side effect of the Topamax.

## 2022-05-31 ENCOUNTER — TELEPHONE (OUTPATIENT)
Dept: FAMILY MEDICINE CLINIC | Age: 57
End: 2022-05-31

## 2022-05-31 NOTE — TELEPHONE ENCOUNTER
Called pt. Verified. Inform pt that Dr. Alexey Boone states that she can stop taking the Topamax. Pt verbalizes understanding.

## 2022-05-31 NOTE — TELEPHONE ENCOUNTER
Received in basket from Dr. Arun Deleon:    Elda Gonzalez MD  P [facility] [address] [facility] Office  Please call patient to schedule an in person visit BP check and medication adjustment in 1-2 weeks.  Has recently seen Dr. Jesse Mayroga so if possible, follow up with her maybe better     Left vm with pt asking her to call us back to schedule fuv

## 2022-06-08 ENCOUNTER — OFFICE VISIT (OUTPATIENT)
Dept: NEUROLOGY | Age: 57
End: 2022-06-08
Payer: MEDICAID

## 2022-06-08 VITALS
SYSTOLIC BLOOD PRESSURE: 140 MMHG | BODY MASS INDEX: 22.29 KG/M2 | RESPIRATION RATE: 16 BRPM | WEIGHT: 142 LBS | HEART RATE: 72 BPM | HEIGHT: 67 IN | DIASTOLIC BLOOD PRESSURE: 80 MMHG | OXYGEN SATURATION: 97 %

## 2022-06-08 DIAGNOSIS — G43.709 CHRONIC MIGRAINE WITHOUT AURA WITHOUT STATUS MIGRAINOSUS, NOT INTRACTABLE: ICD-10-CM

## 2022-06-08 PROCEDURE — 99214 OFFICE O/P EST MOD 30 MIN: CPT | Performed by: PSYCHIATRY & NEUROLOGY

## 2022-06-08 RX ORDER — MECLIZINE HCL 12.5 MG 12.5 MG/1
TABLET ORAL
COMMUNITY
Start: 2022-05-25 | End: 2022-09-20 | Stop reason: SDUPTHER

## 2022-06-08 RX ORDER — SUMATRIPTAN 50 MG/1
TABLET, FILM COATED ORAL
COMMUNITY
Start: 2022-04-12 | End: 2022-06-08

## 2022-06-08 NOTE — PROGRESS NOTES
Aman Norris (1965) is a 64 y.o. female, established patient, here for evaluation of the following     Chief complaint(s):   Chief Complaint   Patient presents with    Migraine     6 weeks follow up, patient states she was recently seen in the ER for vertigo, she also saw an ENT ([facility]), tinnitus specialist, thinks a earing aid would help, she is accompanied by her son, patient states she is in bed all day everyday and has panic attacks, and needs help and cannot be alone       SUBJECTIVE/ OBJECTIVE:    HPI: 64 y.o. female      Pt reported having tinnitus while taking Topamax and stopped the topamax around 5 days ago. Tinnitus continues. [facility] tinnitus, hearing loss, and some vertigo-type complaints. [facility] was denied by insurance. Started Ajovy 2 months ago (has had 2 injections) and about to give herself the 3rd. Pt reports significant reduction in migraine frequency since starting the Ajovy. She reports using Ubrelvy 100 mg tablet half tablet at onset of migraine and half tablet 2 hours later is not enough to limit her migraine. She says she then takes a couple Excedrin and then her headache is gone. I recommended that she use the full tablet of Ubrelvy at the onset of migraine and can repeat a full tablet of Ubrelvy in 2 hours if the headache is still there. # of headache days a week: 0-1 (was 7 days a week before Ajovy)  # of migraine days a month: 0-1 (was 6-7 days a week before Ajovy)     Pt asks if her Vertigo is relate to her migraines. When asked if she has had a similar reduction in the incidence of her vertigo (as her migraines) since starting Ajovy, she says no, not really.  Says she has had less vertigo recetly but only because she feels she has to limit her activities (suggesting her vertigo is movement related and not migraine related).       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 Advised her to take the records from the Neuro-Otologist / ENT she saw in [facility] so ENT at [address] [place] [address] has those records to review. Advised pt to reduce topamax to 50 mg BID for c/o increased dizziness and paresthesias  Gave sample of Ubrelvy 100 mg tablet for patient to try for next migraine headache    F/u in 2 months regarding migraine management          Allergies   Allergen Reactions    Codeine Rash and Itching         Current Outpatient Medications:     meclizine (ANTIVERT) 12.5 mg tablet, TAKE 2 TABLETS BY MOUTH 3 TIMES A DAY AS NEEDED FOR DIZZINESS FOR UP TO 10 DAYS., Disp: , Rfl:     docosahexanoic acid-eicosapent 120-180 mg cap, Take 1 Capsule by mouth daily. , Disp: , Rfl:     fremanezumab-vfrm (AJOVY) 225 mg/1.5 mL auto-injector, 1.5 mL by SubCUTAneous route every thirty (30) days. Indications: migraine prevention, Disp: 1.5 mL, Rfl: 5    ubrogepant (Ubrelvy) 100 mg tablet, Take one tablet at onset of migraine. Repeat one tablet in 2 hours if headache remains. Limit use to 2 days per week., Disp: 16 Tablet, Rfl: 5    triamcinolone (Nasacort) 55 mcg nasal inhaler, 2 Sprays by Both Nostrils route daily. , Disp: 1 Each, Rfl: 5    ondansetron (ZOFRAN ODT) 4 mg disintegrating tablet, Take 1 Tablet by mouth every eight (8) hours as needed for Nausea or Vomiting., Disp: 30 Tablet, Rfl: 0    losartan (COZAAR) 50 mg tablet, Take 1 Tablet by mouth daily. , Disp: 90 Tablet, Rfl: 5    mometasone (Asmanex Twisthaler) 220 mcg/ actuation (120) aepb inhaler, Take 2 Puffs by inhalation two (2) times a day., Disp: 1 Each, Rfl: 5    levonorgestreL (KYLEENA) 17.5 mcg/24 hrs (5 yrs) 19.5 mg IUD IUD, 1 Device by IntraUTERine route once., Disp: , Rfl:    montelukast (SINGULAIR) 10 mg tablet, Take 10 mg by mouth daily. , Disp: , Rfl:     metoprolol tartrate (LOPRESSOR) 100 mg IR tablet, Take 25 mg by mouth daily. , Disp: , Rfl:     fluticasone propionate (FLONASE) 50 mcg/actuation nasal spray, 2 Sprays by Both Nostrils route daily. , Disp: , Rfl:     ipratropium (ATROVENT HFA) 17 mcg/actuation inhaler, 20 mcg by Other route every six (6) hours as needed for Wheezing., Disp: , Rfl:     fish oil-omega-3 fatty acids 340-1,000 mg capsule, Take 1 Capsule by mouth daily. , Disp: , Rfl:     cholecalciferol (Vitamin D3) (1000 Units /25 mcg) tablet, Take 5,000 Units by mouth daily. , Disp: , Rfl:      has a past medical history of Asthma, Bilateral breast cysts (2021), Bronchiectasis, Bronchiectasis ([facility] [address] (2019), Bruxism, Chronic bronchitis, Hypertension, Presbyopia of both eyes (2019), Uterine myoma (2020), and Vertigo (3/2014). has a past surgical history that includes hx  section (); hx back surgery (); and hx lobectomy (). Physical Exam:    Vitals:    22 1440   BP: (!) 140/80   Pulse: 72   Resp: 16   Height: 5' 7\" (1.702 m)   Weight: 64.4 kg (142 lb)   SpO2: 97%     No formal exam today  Patient was resting on exam table, awake, alert conversant  NAD    ========================================    ASSESSMENT/ PLAN:       ICD-10-CM ICD-9-CM    1. Chronic migraine without aura without status migrainosus, not intractable  G43.709 346.70 fremanezumab-vfrm (AJOVY) 225 mg/1.5 mL auto-injector      ubrogepant (Ubrelvy) 100 mg tablet        Excellent response to Ajovy 225 mg SQ once every 30 days as a migraine preventive. Sent Ajovy prescription and Ubrelvy prescription to her local [facility] pharmacy with 5 refills enough to last 6 months. Patient reports that [place] going out of the [place] she is not sure when she is leaving or when she is returning.   Suggested to her that before she leaves she asked for her most recent neurology note i.e. this note) which will detail what medicine she is taking for migraine so that she may request or continue the same medications when she is overseas. Follow-up with me in 6 months or after returning to the [place] An electronic signature was used to authenticate this note.   -- Cris Lorenzo MD

## 2022-06-08 NOTE — PROGRESS NOTES
Chief Complaint   Patient presents with    Migraine     6 weeks follow up, patient states she was recently seen in the ER for vertigo, she also saw an ENT ([facility]), tinnitus specialist, thinks a earing aid would help, she is accompanied by her son, patient states she is in bed all day everyday and has panic attacks, and needs help and cannot be alone     .   Visit Vitals  BP (!) 140/80   Pulse 72   Resp 16   Ht 5' 7\" (1.702 m)   Wt 64.4 kg (142 lb)   SpO2 97%   BMI 22.24 kg/m²

## 2022-06-08 NOTE — PATIENT INSTRUCTIONS
Gama RICHARD initiated through Cover my meds key # [id number] - PA Case ID: [id number]     Request Reference Number: [id number]. Karey Urbano 225/1.5 is approved through 08/26/2022.      For further questions, call [employer] at [phone]

## 2022-06-15 ENCOUNTER — TELEPHONE (OUTPATIENT)
Dept: FAMILY MEDICINE CLINIC | Age: 57
End: 2022-06-15

## 2022-06-15 NOTE — TELEPHONE ENCOUNTER
Called pt to let her know of her appt change this Friday 6/17/22 from Dr. Sarkis Wilson to Dr. Kaley Gramajo schedule. Pt did not answer. Left vm asking her to call us back. When she calls back please make her aware of this change.

## 2022-06-17 ENCOUNTER — OFFICE VISIT (OUTPATIENT)
Dept: FAMILY MEDICINE CLINIC | Age: 57
End: 2022-06-17
Payer: MEDICAID

## 2022-06-17 VITALS
HEIGHT: 69 IN | OXYGEN SATURATION: 96 % | HEART RATE: 76 BPM | WEIGHT: 142 LBS | DIASTOLIC BLOOD PRESSURE: 89 MMHG | SYSTOLIC BLOOD PRESSURE: 164 MMHG | BODY MASS INDEX: 21.03 KG/M2

## 2022-06-17 DIAGNOSIS — Z11.4 SCREENING FOR HIV (HUMAN IMMUNODEFICIENCY VIRUS): ICD-10-CM

## 2022-06-17 DIAGNOSIS — F41.0 PANIC DISORDER: ICD-10-CM

## 2022-06-17 DIAGNOSIS — H93.19 TINNITUS, UNSPECIFIED LATERALITY: ICD-10-CM

## 2022-06-17 DIAGNOSIS — G43.109 MIGRAINE WITH VERTIGO: ICD-10-CM

## 2022-06-17 DIAGNOSIS — I10 HYPERTENSION, UNSPECIFIED TYPE: Primary | ICD-10-CM

## 2022-06-17 DIAGNOSIS — R53.83 FATIGUE, UNSPECIFIED TYPE: ICD-10-CM

## 2022-06-17 PROCEDURE — 99214 OFFICE O/P EST MOD 30 MIN: CPT | Performed by: STUDENT IN AN ORGANIZED HEALTH CARE EDUCATION/TRAINING PROGRAM

## 2022-06-17 RX ORDER — LOSARTAN POTASSIUM 100 MG/1
100 TABLET ORAL DAILY
Qty: 30 TABLET | Refills: 0 | Status: SHIPPED | OUTPATIENT
Start: 2022-06-17 | End: 2022-07-17

## 2022-06-17 NOTE — PROGRESS NOTES
Chief Complaint   Patient presents with    Annual Wellness Visit     Update meds,check on meds     Visit Vitals  BP (!) 164/89 (BP 1 Location: Right arm, BP Patient Position: Sitting, BP Cuff Size: Adult)   Pulse 76   Ht 5' 9\" (1.753 m)   Wt 142 lb (64.4 kg)   SpO2 96%   BMI 20.97 kg/m²

## 2022-06-17 NOTE — PROGRESS NOTES
[address], [place] [address]  [phone]  Date of visit:  6/18/2022    Subjective   Winferd Kocher is a  64 y.o. female with medical history of HTN, Asthma, Bronchiectasis, Migraine presents for routine visit. Migraine: Follows with Neurology (Dr. Alda Hernandez). Last saw patient on 06/8. Stopped Topamax. Taking Ajovy every 30 days. Ubrelvy prn for migraine. HTN:   - Takes Losartan 50 mg daily, Norvasc 2.5 mg  - Stopped Metoprolol 25 mg for 1 month. - She has been using Norvasc 2.5 mg for about 10 years. - She was informed to stop Norvasc 2.5 mg due to dizziness. - Checks BP at home gets around 135-140/76-80. Tinnitus   - Started years ago. - Takes Meclizine prn and it helps. - Saw ENT in the past. [facility] told her she has hearing loss and she needs hearing aid. She saw a tinnitus specialty, she is getting fitted for her hearing aid 06/30.  - Did MRI of the head in the past, it was normal    Went to the ED 3 weeks ago, she was diagnosed with Vertigo, but was thought my EMS to have a panic attack. Started with numbness in the jaw, and moved to tip of fingers and toes. During the event, she had chest tightness, palpitation. Last episode was yesterday, it was not very bad. Had palpation and numbness in the jaw, not in the feet and fingers. Lasted 30-40 min. She took meclizine and fell asleep. When she is alone, it is worse. Review of Systems   Constitutional: Negative for chills and fever. HENT: Positive for hearing loss and tinnitus. Respiratory: Negative for shortness of breath. Cardiovascular: Negative for chest pain and leg swelling. Gastrointestinal: Negative for nausea and vomiting. Musculoskeletal: Negative for neck pain. Neurological: Positive for dizziness. Negative for headaches.        Allergies   Allergies   Allergen Reactions    Codeine Rash and Itching       Medications  No current facility-administered medications for this visit. No current outpatient medications on file. Facility-Administered Medications Ordered in Other Visits   Medication Dose Route Frequency    0.9% sodium chloride with KCl 40 mEq/L infusion   IntraVENous CONTINUOUS    [START ON 6/19/2022] cholecalciferol (VITAMIN D3) (1000 Units /25 mcg) tablet 5,000 Units  5,000 Units Oral DAILY    [START ON 6/19/2022] fluticasone propionate (FLONASE) 50 mcg/actuation nasal spray 2 Spray  2 Spray Both Nostrils DAILY    ipratropium (ATROVENT HFA) 17 mcg inhaler  1 Puff Inhalation Q6H PRN    [START ON 6/19/2022] losartan (COZAAR) tablet 100 mg  100 mg Oral DAILY    meclizine (ANTIVERT) tablet 25 mg  25 mg Oral Q6H PRN    [START ON 6/19/2022] metoprolol tartrate (LOPRESSOR) tablet 25 mg  25 mg Oral DAILY    budesonide (PULMICORT) 500 mcg/2 ml nebulizer suspension  500 mcg Nebulization BID    [START ON 6/19/2022] montelukast (SINGULAIR) tablet 10 mg  10 mg Oral DAILY    . PHARMACY TO SUBSTITUTE PER PROTOCOL (Reordered from: ubrogepant (Ubrelvy) 100 mg tablet)    Per Protocol    sodium chloride (NS) flush 5-40 mL  5-40 mL IntraVENous Q8H    sodium chloride (NS) flush 5-40 mL  5-40 mL IntraVENous PRN    acetaminophen (TYLENOL) tablet 650 mg  650 mg Oral Q6H PRN    Or    acetaminophen (TYLENOL) suppository 650 mg  650 mg Rectal Q6H PRN    polyethylene glycol (MIRALAX) packet 17 g  17 g Oral DAILY PRN    ondansetron (ZOFRAN ODT) tablet 4 mg  4 mg Oral Q8H PRN    Or    ondansetron (ZOFRAN) injection 4 mg  4 mg IntraVENous Q6H PRN    [START ON 6/19/2022] enoxaparin (LOVENOX) injection 40 mg  40 mg SubCUTAneous DAILY    aluminum-magnesium hydroxide (MAALOX) oral suspension 15 mL  15 mL Oral QID PRN       Medical History  Past Medical History:   Diagnosis Date    Asthma     Bilateral breast cysts 02/2021    Bronchiectasis     Bronchiectasis (HCC) 12/2019    Bruxism     Chronic bronchitis     Hypertension     Presbyopia of both eyes 01/2019    Uterine myoma 12/2020    Vertigo 3/2014       Immunizations   Immunization History   Administered Date(s) Administered    (RETIRED) Pneumococcal Vaccine (Unspecified Type) 11/02/2006    COVID-19, Pfizer Purple top, DILUTE for use, 12+ yrs, 30mcg/0.3mL dose 04/01/2021, 04/22/2021    Influenza Vaccine 11/18/2013, 09/25/2014    Influenza Vaccine Whole 10/17/2008    Pneumococcal Polysaccharide (PPSV-23) 01/25/2017    TD Vaccine 10/17/2008    Tdap 01/25/2017    Zoster Recombinant 06/29/2021       Social History  Social History     Tobacco Use    Smoking status: Never Smoker    Smokeless tobacco: Never Used   Substance Use Topics    Alcohol use: No    Drug use: No       Objective     Visit Vitals  BP (!) 164/89 (BP 1 Location: Right arm, BP Patient Position: Sitting, BP Cuff Size: Adult)   Pulse 76   Ht 5' 9\" (1.753 m)   Wt 142 lb (64.4 kg)   SpO2 96%   BMI 20.97 kg/m²       Physical Exam  Constitutional:       Appearance: Normal appearance. HENT:      Head: Normocephalic and atraumatic. Cardiovascular:      Rate and Rhythm: Normal rate and regular rhythm. Pulmonary:      Effort: Pulmonary effort is normal. No respiratory distress. Neurological:      Mental Status: She is alert. Marlen Downey is a 64 y.o. female who presents for routine check up. Plan     1. Tinnitus, unspecified laterality: See HPI  - F/u with tinnitus specialty for fitting for hearing aid. 2. Hypertension, unspecified type  BP Readings from Last 3 Encounters:   06/18/22 (!) 171/87   06/17/22 (!) 164/89   06/08/22 (!) 140/80   - Losartan 50 mg increased to losartan (COZAAR) 100 mg tablet; Take 1 Tablet by mouth daily. Dispense: 30 Tablet; Refill: 0  - Stop Norvasc 2.5 mg given side effect of dizziness.   - METABOLIC PANEL, BASIC   - Follow up in 2 weeks with BP log. 3. Migraine with vertigo: See HPI  - F/u with neurology.      4. Panic disorder: Patient experienced what seems like episodes of panic attack. She would like to hold off on starting medication because she would like to complete getting fitted for her hearing aid. The episodes of what seems like panic attack may be attributed to having episodes of vertigo. Pt would like not to be started on medication that have a side effect of dizziness or tinnitus. - f/u after hearing aid is fitted. Consider starting on low does of Effexor. Follow-up and Dispositions    · Return in about 2 weeks (around 7/1/2022) for BP check . I have discussed the aforementioned diagnoses and plan with the patient in detail. I have provided information in person and/or in AVS. All questions answered prior to discharge.     I discussed this patient with Dr. Antonio Persaud   Signed By:  Jalil Tripathi MD    Family Medicine Resident

## 2022-06-18 ENCOUNTER — TELEPHONE (OUTPATIENT)
Dept: FAMILY MEDICINE CLINIC | Age: 57
End: 2022-06-18

## 2022-06-18 ENCOUNTER — HOSPITAL ENCOUNTER (INPATIENT)
Age: 57
LOS: 3 days | Discharge: HOME | DRG: 720 | End: 2022-06-21
Attending: EMERGENCY MEDICINE | Admitting: HOSPITALIST
Payer: MEDICAID

## 2022-06-18 DIAGNOSIS — E87.6 HYPOKALEMIA: Primary | ICD-10-CM

## 2022-06-18 LAB
ALBUMIN SERPL-MCNC: 4.1 G/DL (ref 3.5–5)
ALBUMIN/GLOB SERPL: 0.9 {RATIO} (ref 1.1–2.2)
ALP SERPL-CCNC: 70 U/L (ref 45–117)
ALT SERPL-CCNC: 41 U/L (ref 12–78)
ANION GAP SERPL CALC-SCNC: 5 MMOL/L (ref 5–15)
ANION GAP SERPL CALC-SCNC: 6 MMOL/L (ref 5–15)
ANION GAP SERPL CALC-SCNC: 6 MMOL/L (ref 5–15)
AST SERPL-CCNC: 44 U/L (ref 15–37)
BASOPHILS # BLD: 0.1 K/UL (ref 0–0.1)
BASOPHILS # BLD: 0.1 K/UL (ref 0–0.1)
BASOPHILS NFR BLD: 1 % (ref 0–1)
BASOPHILS NFR BLD: 1 % (ref 0–1)
BILIRUB SERPL-MCNC: 2.7 MG/DL (ref 0.2–1)
BUN SERPL-MCNC: 11 MG/DL (ref 6–20)
BUN SERPL-MCNC: 11 MG/DL (ref 6–20)
BUN SERPL-MCNC: 8 MG/DL (ref 6–20)
BUN/CREAT SERPL: 11 (ref 12–20)
BUN/CREAT SERPL: 15 (ref 12–20)
BUN/CREAT SERPL: 19 (ref 12–20)
CALCIUM SERPL-MCNC: 8.9 MG/DL (ref 8.5–10.1)
CALCIUM SERPL-MCNC: 9.5 MG/DL (ref 8.5–10.1)
CALCIUM SERPL-MCNC: 9.8 MG/DL (ref 8.5–10.1)
CHLORIDE SERPL-SCNC: 100 MMOL/L (ref 97–108)
CHLORIDE SERPL-SCNC: 98 MMOL/L (ref 97–108)
CHLORIDE SERPL-SCNC: 99 MMOL/L (ref 97–108)
CO2 SERPL-SCNC: 36 MMOL/L (ref 21–32)
CO2 SERPL-SCNC: 37 MMOL/L (ref 21–32)
CO2 SERPL-SCNC: 37 MMOL/L (ref 21–32)
CREAT SERPL-MCNC: 0.59 MG/DL (ref 0.55–1.02)
CREAT SERPL-MCNC: 0.71 MG/DL (ref 0.55–1.02)
CREAT SERPL-MCNC: 0.72 MG/DL (ref 0.55–1.02)
DIFFERENTIAL METHOD BLD: ABNORMAL
DIFFERENTIAL METHOD BLD: ABNORMAL
EOSINOPHIL # BLD: 0.1 K/UL (ref 0–0.4)
EOSINOPHIL # BLD: 0.1 K/UL (ref 0–0.4)
EOSINOPHIL NFR BLD: 0 % (ref 0–7)
EOSINOPHIL NFR BLD: 1 % (ref 0–7)
ERYTHROCYTE [DISTWIDTH] IN BLOOD BY AUTOMATED COUNT: 14.4 % (ref 11.5–14.5)
ERYTHROCYTE [DISTWIDTH] IN BLOOD BY AUTOMATED COUNT: 15.5 % (ref 11.5–14.5)
GLOBULIN SER CALC-MCNC: 4.4 G/DL (ref 2–4)
GLUCOSE SERPL-MCNC: 147 MG/DL (ref 65–100)
GLUCOSE SERPL-MCNC: 163 MG/DL (ref 65–100)
GLUCOSE SERPL-MCNC: 69 MG/DL (ref 65–100)
HCT VFR BLD AUTO: 42.1 % (ref 35–47)
HCT VFR BLD AUTO: 43.5 % (ref 35–47)
HGB BLD-MCNC: 13.9 G/DL (ref 11.5–16)
HGB BLD-MCNC: 14.4 G/DL (ref 11.5–16)
HIV 1+2 AB+HIV1 P24 AG SERPL QL IA: NONREACTIVE
HIV12 RESULT COMMENT, HHIVC: NORMAL
IMM GRANULOCYTES # BLD AUTO: 0 K/UL (ref 0–0.04)
IMM GRANULOCYTES # BLD AUTO: 0.1 K/UL (ref 0–0.04)
IMM GRANULOCYTES NFR BLD AUTO: 0 % (ref 0–0.5)
IMM GRANULOCYTES NFR BLD AUTO: 0 % (ref 0–0.5)
LYMPHOCYTES # BLD: 1 K/UL (ref 0.8–3.5)
LYMPHOCYTES # BLD: 2.3 K/UL (ref 0.8–3.5)
LYMPHOCYTES NFR BLD: 32 % (ref 12–49)
LYMPHOCYTES NFR BLD: 7 % (ref 12–49)
MAGNESIUM SERPL-MCNC: 1.8 MG/DL (ref 1.6–2.4)
MCH RBC QN AUTO: 29.4 PG (ref 26–34)
MCH RBC QN AUTO: 29.4 PG (ref 26–34)
MCHC RBC AUTO-ENTMCNC: 32 G/DL (ref 30–36.5)
MCHC RBC AUTO-ENTMCNC: 34.2 G/DL (ref 30–36.5)
MCV RBC AUTO: 85.9 FL (ref 80–99)
MCV RBC AUTO: 92.2 FL (ref 80–99)
MONOCYTES # BLD: 0.4 K/UL (ref 0–1)
MONOCYTES # BLD: 0.5 K/UL (ref 0–1)
MONOCYTES NFR BLD: 4 % (ref 5–13)
MONOCYTES NFR BLD: 6 % (ref 5–13)
NEUTS SEG # BLD: 11.9 K/UL (ref 1.8–8)
NEUTS SEG # BLD: 4.2 K/UL (ref 1.8–8)
NEUTS SEG NFR BLD: 60 % (ref 32–75)
NEUTS SEG NFR BLD: 88 % (ref 32–75)
NRBC # BLD: 0 K/UL (ref 0–0.01)
NRBC # BLD: 0 K/UL (ref 0–0.01)
NRBC BLD-RTO: 0 PER 100 WBC
NRBC BLD-RTO: 0 PER 100 WBC
PLATELET # BLD AUTO: 220 K/UL (ref 150–400)
PLATELET # BLD AUTO: 221 K/UL (ref 150–400)
PMV BLD AUTO: 10.8 FL (ref 8.9–12.9)
PMV BLD AUTO: 11.1 FL (ref 8.9–12.9)
POTASSIUM SERPL-SCNC: 2 MMOL/L (ref 3.5–5.1)
POTASSIUM SERPL-SCNC: 2.2 MMOL/L (ref 3.5–5.1)
POTASSIUM SERPL-SCNC: 2.3 MMOL/L (ref 3.5–5.1)
POTASSIUM UR-SCNC: 25 MMOL/L
PROT SERPL-MCNC: 8.5 G/DL (ref 6.4–8.2)
RBC # BLD AUTO: 4.72 M/UL (ref 3.8–5.2)
RBC # BLD AUTO: 4.9 M/UL (ref 3.8–5.2)
RBC MORPH BLD: ABNORMAL
SODIUM SERPL-SCNC: 140 MMOL/L (ref 136–145)
SODIUM SERPL-SCNC: 141 MMOL/L (ref 136–145)
SODIUM SERPL-SCNC: 143 MMOL/L (ref 136–145)
TSH SERPL DL<=0.05 MIU/L-ACNC: 1.9 UIU/ML (ref 0.36–3.74)
WBC # BLD AUTO: 13.5 K/UL (ref 3.6–11)
WBC # BLD AUTO: 7.1 K/UL (ref 3.6–11)

## 2022-06-18 PROCEDURE — 74011250637 HC RX REV CODE- 250/637: Performed by: HOSPITALIST

## 2022-06-18 PROCEDURE — 94760 N-INVAS EAR/PLS OXIMETRY 1: CPT

## 2022-06-18 PROCEDURE — 83735 ASSAY OF MAGNESIUM: CPT

## 2022-06-18 PROCEDURE — 84133 ASSAY OF URINE POTASSIUM: CPT

## 2022-06-18 PROCEDURE — 82533 TOTAL CORTISOL: CPT

## 2022-06-18 PROCEDURE — 85025 COMPLETE CBC W/AUTO DIFF WBC: CPT

## 2022-06-18 PROCEDURE — 82088 ASSAY OF ALDOSTERONE: CPT

## 2022-06-18 PROCEDURE — 36415 COLL VENOUS BLD VENIPUNCTURE: CPT

## 2022-06-18 PROCEDURE — 80053 COMPREHEN METABOLIC PANEL: CPT

## 2022-06-18 PROCEDURE — 99285 EMERGENCY DEPT VISIT HI MDM: CPT

## 2022-06-18 PROCEDURE — 94640 AIRWAY INHALATION TREATMENT: CPT

## 2022-06-18 PROCEDURE — 96365 THER/PROPH/DIAG IV INF INIT: CPT

## 2022-06-18 PROCEDURE — 74011250636 HC RX REV CODE- 250/636: Performed by: PHYSICIAN ASSISTANT

## 2022-06-18 PROCEDURE — 74011250637 HC RX REV CODE- 250/637: Performed by: INTERNAL MEDICINE

## 2022-06-18 PROCEDURE — 84244 ASSAY OF RENIN: CPT

## 2022-06-18 PROCEDURE — 93005 ELECTROCARDIOGRAM TRACING: CPT

## 2022-06-18 PROCEDURE — 74011250636 HC RX REV CODE- 250/636: Performed by: INTERNAL MEDICINE

## 2022-06-18 PROCEDURE — 65270000046 HC RM TELEMETRY

## 2022-06-18 PROCEDURE — 74011000250 HC RX REV CODE- 250: Performed by: HOSPITALIST

## 2022-06-18 PROCEDURE — 74011250636 HC RX REV CODE- 250/636: Performed by: EMERGENCY MEDICINE

## 2022-06-18 PROCEDURE — 74011250636 HC RX REV CODE- 250/636: Performed by: HOSPITALIST

## 2022-06-18 PROCEDURE — 74011250637 HC RX REV CODE- 250/637: Performed by: EMERGENCY MEDICINE

## 2022-06-18 RX ORDER — METOPROLOL TARTRATE 25 MG/1
25 TABLET, FILM COATED ORAL DAILY
Status: DISCONTINUED | OUTPATIENT
Start: 2022-06-19 | End: 2022-06-21 | Stop reason: HOSPADM

## 2022-06-18 RX ORDER — BUDESONIDE 0.5 MG/2ML
500 INHALANT ORAL 2 TIMES DAILY
Refills: 5 | Status: DISCONTINUED | OUTPATIENT
Start: 2022-06-18 | End: 2022-06-20

## 2022-06-18 RX ORDER — SODIUM CHLORIDE 0.9 % (FLUSH) 0.9 %
5-40 SYRINGE (ML) INJECTION AS NEEDED
Status: DISCONTINUED | OUTPATIENT
Start: 2022-06-18 | End: 2022-06-21 | Stop reason: HOSPADM

## 2022-06-18 RX ORDER — ENOXAPARIN SODIUM 100 MG/ML
40 INJECTION SUBCUTANEOUS DAILY
Status: DISCONTINUED | OUTPATIENT
Start: 2022-06-19 | End: 2022-06-21 | Stop reason: HOSPADM

## 2022-06-18 RX ORDER — ACETAMINOPHEN 650 MG/1
650 SUPPOSITORY RECTAL
Status: DISCONTINUED | OUTPATIENT
Start: 2022-06-18 | End: 2022-06-21 | Stop reason: HOSPADM

## 2022-06-18 RX ORDER — MAGNESIUM SULFATE HEPTAHYDRATE 40 MG/ML
2 INJECTION, SOLUTION INTRAVENOUS ONCE
Status: COMPLETED | OUTPATIENT
Start: 2022-06-18 | End: 2022-06-18

## 2022-06-18 RX ORDER — POTASSIUM CHLORIDE 7.45 MG/ML
10 INJECTION INTRAVENOUS ONCE
Status: COMPLETED | OUTPATIENT
Start: 2022-06-18 | End: 2022-06-18

## 2022-06-18 RX ORDER — POTASSIUM CHLORIDE 7.45 MG/ML
10 INJECTION INTRAVENOUS
Status: DISCONTINUED | OUTPATIENT
Start: 2022-06-18 | End: 2022-06-18 | Stop reason: SDUPTHER

## 2022-06-18 RX ORDER — LOSARTAN POTASSIUM 100 MG/1
100 TABLET ORAL DAILY
Status: DISCONTINUED | OUTPATIENT
Start: 2022-06-19 | End: 2022-06-21 | Stop reason: HOSPADM

## 2022-06-18 RX ORDER — ACETAMINOPHEN 325 MG/1
650 TABLET ORAL
Status: DISCONTINUED | OUTPATIENT
Start: 2022-06-18 | End: 2022-06-21 | Stop reason: HOSPADM

## 2022-06-18 RX ORDER — SPIRONOLACTONE 25 MG/1
25 TABLET ORAL 2 TIMES DAILY
Status: DISCONTINUED | OUTPATIENT
Start: 2022-06-18 | End: 2022-06-19

## 2022-06-18 RX ORDER — MECLIZINE HCL 12.5 MG 12.5 MG/1
25 TABLET ORAL
Status: DISCONTINUED | OUTPATIENT
Start: 2022-06-18 | End: 2022-06-21 | Stop reason: HOSPADM

## 2022-06-18 RX ORDER — FLUTICASONE PROPIONATE 50 MCG
2 SPRAY, SUSPENSION (ML) NASAL DAILY
Status: DISCONTINUED | OUTPATIENT
Start: 2022-06-19 | End: 2022-06-21 | Stop reason: HOSPADM

## 2022-06-18 RX ORDER — POTASSIUM CHLORIDE AND SODIUM CHLORIDE 900; 300 MG/100ML; MG/100ML
INJECTION, SOLUTION INTRAVENOUS CONTINUOUS
Status: DISCONTINUED | OUTPATIENT
Start: 2022-06-18 | End: 2022-06-20

## 2022-06-18 RX ORDER — SODIUM CHLORIDE 0.9 % (FLUSH) 0.9 %
5-40 SYRINGE (ML) INJECTION EVERY 8 HOURS
Status: DISCONTINUED | OUTPATIENT
Start: 2022-06-18 | End: 2022-06-21 | Stop reason: HOSPADM

## 2022-06-18 RX ORDER — POTASSIUM CHLORIDE 20 MEQ/1
40 TABLET, EXTENDED RELEASE ORAL
Status: COMPLETED | OUTPATIENT
Start: 2022-06-18 | End: 2022-06-18

## 2022-06-18 RX ORDER — MONTELUKAST SODIUM 10 MG/1
10 TABLET ORAL DAILY
Status: DISCONTINUED | OUTPATIENT
Start: 2022-06-19 | End: 2022-06-21 | Stop reason: HOSPADM

## 2022-06-18 RX ORDER — ONDANSETRON 4 MG/1
4 TABLET, ORALLY DISINTEGRATING ORAL
Status: DISCONTINUED | OUTPATIENT
Start: 2022-06-18 | End: 2022-06-21 | Stop reason: HOSPADM

## 2022-06-18 RX ORDER — TRIAMCINOLONE ACETONIDE 55 UG/1
2 SPRAY, METERED NASAL DAILY
Status: DISCONTINUED | OUTPATIENT
Start: 2022-06-19 | End: 2022-06-18 | Stop reason: SDUPTHER

## 2022-06-18 RX ORDER — MELATONIN
5000 DAILY
Status: DISCONTINUED | OUTPATIENT
Start: 2022-06-19 | End: 2022-06-21 | Stop reason: HOSPADM

## 2022-06-18 RX ORDER — ONDANSETRON 2 MG/ML
4 INJECTION INTRAMUSCULAR; INTRAVENOUS
Status: DISCONTINUED | OUTPATIENT
Start: 2022-06-18 | End: 2022-06-21 | Stop reason: HOSPADM

## 2022-06-18 RX ORDER — POLYETHYLENE GLYCOL 3350 17 G/17G
17 POWDER, FOR SOLUTION ORAL DAILY PRN
Status: DISCONTINUED | OUTPATIENT
Start: 2022-06-18 | End: 2022-06-21 | Stop reason: HOSPADM

## 2022-06-18 RX ORDER — KETOROLAC TROMETHAMINE 30 MG/ML
15 INJECTION, SOLUTION INTRAMUSCULAR; INTRAVENOUS
Status: COMPLETED | OUTPATIENT
Start: 2022-06-18 | End: 2022-06-18

## 2022-06-18 RX ADMIN — SPIRONOLACTONE 25 MG: 25 TABLET ORAL at 17:07

## 2022-06-18 RX ADMIN — SODIUM CHLORIDE, PRESERVATIVE FREE 10 ML: 5 INJECTION INTRAVENOUS at 13:51

## 2022-06-18 RX ADMIN — ACETAMINOPHEN 650 MG: 325 TABLET ORAL at 18:43

## 2022-06-18 RX ADMIN — POTASSIUM CHLORIDE AND SODIUM CHLORIDE: 900; 300 INJECTION, SOLUTION INTRAVENOUS at 13:51

## 2022-06-18 RX ADMIN — SODIUM CHLORIDE, PRESERVATIVE FREE 10 ML: 5 INJECTION INTRAVENOUS at 21:40

## 2022-06-18 RX ADMIN — ACETAMINOPHEN 650 MG: 325 TABLET ORAL at 13:49

## 2022-06-18 RX ADMIN — POTASSIUM CHLORIDE 40 MEQ: 20 TABLET, EXTENDED RELEASE ORAL at 11:17

## 2022-06-18 RX ADMIN — POTASSIUM CHLORIDE 10 MEQ: 7.46 INJECTION, SOLUTION INTRAVENOUS at 11:18

## 2022-06-18 RX ADMIN — KETOROLAC TROMETHAMINE 15 MG: 30 INJECTION, SOLUTION INTRAMUSCULAR; INTRAVENOUS at 21:37

## 2022-06-18 RX ADMIN — ONDANSETRON 4 MG: 2 INJECTION INTRAMUSCULAR; INTRAVENOUS at 18:43

## 2022-06-18 RX ADMIN — MAGNESIUM SULFATE HEPTAHYDRATE 2 G: 40 INJECTION, SOLUTION INTRAVENOUS at 17:07

## 2022-06-18 RX ADMIN — POTASSIUM BICARBONATE 20 MEQ: 782 TABLET, EFFERVESCENT ORAL at 17:07

## 2022-06-18 RX ADMIN — POTASSIUM CHLORIDE 10 MEQ: 7.46 INJECTION, SOLUTION INTRAVENOUS at 18:33

## 2022-06-18 RX ADMIN — BUDESONIDE 500 MCG: 0.5 INHALANT RESPIRATORY (INHALATION) at 20:09

## 2022-06-18 RX ADMIN — ONDANSETRON 4 MG: 2 INJECTION INTRAMUSCULAR; INTRAVENOUS at 13:49

## 2022-06-18 RX ADMIN — POTASSIUM CHLORIDE 10 MEQ: 7.46 INJECTION, SOLUTION INTRAVENOUS at 20:28

## 2022-06-18 NOTE — H&P
Admission History and Physical      NAME:  Renny Mccurdy   :   1965   MRN:  [id number]     PCP:  None     Date/Time:  2022           Subjective:     CHIEF COMPLAINT: Hypokalemia    HISTORY OF PRESENT ILLNESS:     Ms. Rickey Coats is a 64 y.o. female known history of hypokalemia  lost to follow-up with [facility]. Patient had outpatient labs with low potassium, PCP asked her to go to the emergency room. Patient presented to [facility] ED. Potassium was 2.0. Patient was given IV and p.o. potassium. She is being admitted to hospital for further care. She has chronic vertigo/dizziness, she has been having panic attacks chronically, she has some stomach discomfort, she is intermittently short of breath from bronchiectasis. She does not ambulate or walk as much. She denies any chest pain, headache, blurred vision. She has chronic intermittent nausea. She has chronic migraines but improved. She denies any fever or chills. She denies any rectal bleed, hematemesis. She is admitted to hospital for further care. Past Medical History:   Diagnosis Date    Asthma     Bilateral breast cysts 2021    Bronchiectasis     Bronchiectasis ([address] 2019    Bruxism     Chronic bronchitis     Hypertension     Presbyopia of both eyes 2019    Uterine myoma 2020    Vertigo 3/2014        Past Surgical History:   Procedure Laterality Date    HX BACK SURGERY      HX  SECTION  1990    x1 w/ twins    HX LOBECTOMY      RML       Social History     Tobacco Use    Smoking status: Never Smoker    Smokeless tobacco: Never Used   Substance Use Topics    Alcohol use: No        Family History   Problem Relation Age of Onset    Hypertension Mother     Glaucoma Sister     Hypertension Sister         Allergies   Allergen Reactions    Codeine Rash and Itching        Prior to Admission medications    Medication Sig Start Date End Date Taking?  Authorizing Provider losartan (COZAAR) 100 mg tablet Take 1 Tablet by mouth daily. 6/17/22   Radha Castañeda MD   meclizine (ANTIVERT) 12.5 mg tablet TAKE 2 TABLETS BY MOUTH 3 TIMES A DAY AS NEEDED FOR DIZZINESS FOR UP TO 10 DAYS. 5/25/22   Provider, Historical   docosahexanoic acid-eicosapent 120-180 mg cap Take 1 Capsule by mouth daily. Provider, Historical   fremanezumab-vfrm (AJOVY) 225 mg/1.5 mL auto-injector 1.5 mL by SubCUTAneous route every thirty (30) days. Indications: migraine prevention 6/8/22   Dominick Hernandez MD   Les Arnav Valdez Limbdavid) 100 mg tablet Take one tablet at onset of migraine. Repeat one tablet in 2 hours if headache remains. Limit use to 2 days per week. 6/8/22   Dominick Hernandez MD   triamcinolone (Nasacort) 55 mcg nasal inhaler 2 Sprays by Both Nostrils route daily. 5/19/22   Quin Galvan MD   ondansetron (ZOFRAN ODT) 4 mg disintegrating tablet Take 1 Tablet by mouth every eight (8) hours as needed for Nausea or Vomiting. 1/3/22   Markie Newman,    mometasone (Asmanex Twisthaler) 220 mcg/ actuation (120) aepb inhaler Take 2 Puffs by inhalation two (2) times a day. 10/14/21   Christopher Ray MD   levonorgestreL Verdia Pali) 17.5 mcg/24 hrs (5 yrs) 19.5 mg IUD IUD 1 Device by IntraUTERine route once. Provider, Historical   montelukast (SINGULAIR) 10 mg tablet Take 10 mg by mouth daily. 8/9/21   Provider, Historical   metoprolol tartrate (LOPRESSOR) 100 mg IR tablet Take 25 mg by mouth daily. Provider, Historical   fluticasone propionate (FLONASE) 50 mcg/actuation nasal spray 2 Sprays by Both Nostrils route daily. Provider, Historical   ipratropium (ATROVENT HFA) 17 mcg/actuation inhaler 20 mcg by Other route every six (6) hours as needed for Wheezing. Provider, Historical   fish oil-omega-3 fatty acids 340-1,000 mg capsule Take 1 Capsule by mouth daily. Provider, Historical   cholecalciferol (Vitamin D3) (1000 Units /25 mcg) tablet Take 5,000 Units by mouth daily. Provider, Historical         Review of Systems:  Pertinent findings per HPI otherwise remaining 10 review of system negative         Objective:      VITALS:    Vital signs reviewed; most recent are:    Visit Vitals  BP (!) 146/82   Pulse 84   Temp 98.2 °F (36.8 °C)   Resp 17   Ht 5' 9\" (1.753 m)   Wt 63.5 kg (139 lb 15.9 oz)   SpO2 95%   BMI 20.67 kg/m²     SpO2 Readings from Last 6 Encounters:   06/18/22 95%   06/17/22 96%   06/08/22 97%   04/26/22 96%   04/07/22 98%   03/25/22 97%        No intake or output data in the 24 hours ending 06/18/22 1304         Exam:     Physical Exam:    Gen: Thin built and nourished  HEENT:  moist mucous membranes  Neck:  Supple, r  Resp:  No accessory muscle use,  CTA  Card:  No murmurs, normal S1, S2 without thrills, bruits or peripheral edema  Abd:  Soft, non-tender, non-distended, normoactive bowel sounds are present  Musc:  No cyanosis or clubbing  Skin:  No visible rashes or ulcers, skin turgor is good  Neuro:   follows commands appropriately, no focal deficits  Psych: Anxious       Labs:    Recent Labs     06/18/22  0947   WBC 13.5*   HGB 14.4   HCT 42.1        Recent Labs     06/18/22  0947      K 2.0*   CL 98   CO2 37*   *   BUN 11   CREA 0.71   CA 9.5   ALB 4.1   TBILI 2.7*   ALT 41     Lab Results   Component Value Date/Time    Glucose (POC) 201 (H) 03/11/2014 06:33 AM     No results for input(s): PH, PCO2, PO2, HCO3, FIO2 in the last 72 hours. No results for input(s): INR, INREXT in the last 72 hours. Assesment and Plan:     Severe hypokalemia-  Etiology unclear  Replete IV/p.o. potassium  Repeat potassium later today and in the morning. Nephrology consult requested will follow recommendations.     HTN- continue on home medications, monitor BP  Chronic vertigo- continue meclizine for the as needed dizziness  Chronic asthma/bronchiectasis continue home medications monitor clinically  Chronic migraines- monitor on home medications    AD-patient did not specify  DVT PRx SQ Lovenox  NOK son    Disposition- Home if stable in 48 hours           ___________________________________________________    Attending Physician: Irina Gomez MD   6/18/2022

## 2022-06-18 NOTE — TELEPHONE ENCOUNTER
Called patient in regards to the result of her BMP. Potassium is critically low at 2.2. If oral potassium were to be given to improve pt's potassium level, it will require a generous amount, with no definite assurance that pt's K will respond to oral repletion. Patient was advised to go to the nearest urgent care for IV potassium. Potassium level can be monitored after repletion.     Signed By: Duaine Denver, MD     June 18, 2022

## 2022-06-18 NOTE — PROGRESS NOTES
Problem: Falls - Risk of  Goal: *Absence of Falls  Description: Document Nabor Allenhmann Fall Risk and appropriate interventions in the flowsheet.   Outcome: Progressing Towards Goal  Note: Fall Risk Interventions:       Elimination Interventions: Bed/chair exit alarm

## 2022-06-18 NOTE — ED PROVIDER NOTES
EMERGENCY DEPARTMENT HISTORY AND PHYSICAL EXAM      Date: 6/18/2022  Patient Name: Juwan Villareal    History of Presenting Illness     Chief Complaint   Patient presents with    Dizziness     approximately a week. dizziness and lightheaded.  Abnormal Lab Results     yesterday had gone to Primary doctor and found the potassoum was 2.2, advise to come to ED. no chest.          HPI: Juwan Villareal, 64 y.o. female presents to the ED with cc of low potassium. Routine labs done yesterday and apparently K low 2s. She has felt light headed but otherwise feels well. 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 She does not regularly follow-up with nephrology. There are no other complaints, changes, or physical findings at this time. PCP: None    No current facility-administered medications on file prior to encounter. Current Outpatient Medications on File Prior to Encounter   Medication Sig Dispense Refill    losartan (COZAAR) 100 mg tablet Take 1 Tablet by mouth daily. 30 Tablet 0    meclizine (ANTIVERT) 12.5 mg tablet TAKE 2 TABLETS BY MOUTH 3 TIMES A DAY AS NEEDED FOR DIZZINESS FOR UP TO 10 DAYS.  docosahexanoic acid-eicosapent 120-180 mg cap Take 1 Capsule by mouth daily.  fremanezumab-vfrm (AJOVY) 225 mg/1.5 mL auto-injector 1.5 mL by SubCUTAneous route every thirty (30) days. Indications: migraine prevention 1.5 mL 5    ubrogepant (Ubrelvy) 100 mg tablet Take one tablet at onset of migraine. Repeat one tablet in 2 hours if headache remains. Limit use to 2 days per week. 16 Tablet 5    triamcinolone (Nasacort) 55 mcg nasal inhaler 2 Sprays by Both Nostrils route daily. 1 Each 5    ondansetron (ZOFRAN ODT) 4 mg disintegrating tablet Take 1 Tablet by mouth every eight (8) hours as needed for Nausea or Vomiting.  30 Tablet 0    mometasone (Asmanex Twisthaler) 220 mcg/ actuation (120) aepb inhaler Take 2 Puffs by inhalation two (2) times a day. 1 Each 5    levonorgestreL (KYLEENA) 17.5 mcg/24 hrs (5 yrs) 19.5 mg IUD IUD 1 Device by IntraUTERine route once.  montelukast (SINGULAIR) 10 mg tablet Take 10 mg by mouth daily.  metoprolol tartrate (LOPRESSOR) 100 mg IR tablet Take 25 mg by mouth daily.  fluticasone propionate (FLONASE) 50 mcg/actuation nasal spray 2 Sprays by Both Nostrils route daily.  ipratropium (ATROVENT HFA) 17 mcg/actuation inhaler 20 mcg by Other route every six (6) hours as needed for Wheezing.  fish oil-omega-3 fatty acids 340-1,000 mg capsule Take 1 Capsule by mouth daily.  cholecalciferol (Vitamin D3) (1000 Units /25 mcg) tablet Take 5,000 Units by mouth daily. Past History     Past Medical History:  Past Medical History:   Diagnosis Date    Asthma     Bilateral breast cysts 2021    Bronchiectasis     Bronchiectasis ([address] 2019    Bruxism     Chronic bronchitis     Hypertension     Presbyopia of both eyes 2019    Uterine myoma 2020    Vertigo 3/2014       Past Surgical History:  Past Surgical History:   Procedure Laterality Date    HX BACK SURGERY      HX  SECTION  1990    x1 w/ twins    HX LOBECTOMY  1996    RML       Family History:  Family History   Problem Relation Age of Onset   [facility] Hypertension Mother    [facility] Glaucoma Sister     Hypertension Sister        Social History:  Social History     Tobacco Use    Smoking status: Never Smoker    Smokeless tobacco: Never Used   Substance Use Topics    Alcohol use: No    Drug use: No       Allergies: Allergies   Allergen Reactions    Codeine Rash and Itching         Review of Systems   Review of Systems   Constitutional: Negative for chills and fever. HENT: Negative for rhinorrhea. Eyes: Negative for redness. Respiratory: Negative for shortness of breath. Cardiovascular: Negative for chest pain. Gastrointestinal: Negative for abdominal pain. Genitourinary: Negative for dysuria. Musculoskeletal: Negative for back pain. Neurological: Positive for light-headedness. Negative for syncope. Psychiatric/Behavioral: The patient is not nervous/anxious. All other systems reviewed and are negative. Physical Exam   Physical Exam  Vitals and nursing note reviewed. Constitutional:       Appearance: Normal appearance. HENT:      Head: Normocephalic and atraumatic. Mouth/Throat:      Mouth: Mucous membranes are moist.   Eyes:      Extraocular Movements: Extraocular movements intact. Cardiovascular:      Rate and Rhythm: Normal rate and regular rhythm. Pulses: Normal pulses. Pulmonary:      Effort: Pulmonary effort is normal.      Breath sounds: Normal breath sounds. Abdominal:      Palpations: Abdomen is soft. Tenderness: There is no abdominal tenderness. Musculoskeletal:         General: No deformity. Cervical back: Neck supple. Skin:     General: Skin is warm and dry. Neurological:      General: No focal deficit present. Mental Status: She is alert.    Psychiatric:         Mood and Affect: Mood normal.         Behavior: Behavior normal.         Diagnostic Study Results     Labs -     Recent Results (from the past 24 hour(s))   METABOLIC PANEL, BASIC    Collection Time: 06/17/22 11:20 AM   Result Value Ref Range    Sodium 143 136 - 145 mmol/L    Potassium 2.2 (LL) 3.5 - 5.1 mmol/L    Chloride 100 97 - 108 mmol/L    CO2 37 (H) 21 - 32 mmol/L    Anion gap 6 5 - 15 mmol/L    Glucose 69 65 - 100 mg/dL    BUN 11 6 - 20 MG/DL    Creatinine 0.59 0.55 - 1.02 MG/DL    BUN/Creatinine ratio 19 12 - 20      GFR est AA >60 >60 ml/min/1.73m2    GFR est non-AA >60 >60 ml/min/1.73m2    Calcium 9.8 8.5 - 10.1 MG/DL   HIV 1/2 AG/AB, 4TH GENERATION,W RFLX CONFIRM    Collection Time: 06/17/22 11:20 AM   Result Value Ref Range    HIV 1/2 Interpretation NONREACTIVE NONREACTIVE      HIV 1/2 result comment SEE NOTE     TSH 3RD GENERATION Collection Time: 06/17/22 11:20 AM   Result Value Ref Range    TSH 1.90 0.36 - 3.74 uIU/mL   CBC WITH AUTOMATED DIFF    Collection Time: 06/17/22 11:20 AM   Result Value Ref Range    WBC 7.1 3.6 - 11.0 K/uL    RBC 4.72 3.80 - 5.20 M/uL    HGB 13.9 11.5 - 16.0 g/dL    HCT 43.5 35.0 - 47.0 %    MCV 92.2 80.0 - 99.0 FL    MCH 29.4 26.0 - 34.0 PG    MCHC 32.0 30.0 - 36.5 g/dL    RDW 15.5 (H) 11.5 - 14.5 %    PLATELET 037 739 - 705 K/uL    MPV 11.1 8.9 - 12.9 FL    NRBC 0.0 0  WBC    ABSOLUTE NRBC 0.00 0.00 - 0.01 K/uL    NEUTROPHILS 60 32 - 75 %    LYMPHOCYTES 32 12 - 49 %    MONOCYTES 6 5 - 13 %    EOSINOPHILS 1 0 - 7 %    BASOPHILS 1 0 - 1 %    IMMATURE GRANULOCYTES 0 0.0 - 0.5 %    ABS. NEUTROPHILS 4.2 1.8 - 8.0 K/UL    ABS. LYMPHOCYTES 2.3 0.8 - 3.5 K/UL    ABS. MONOCYTES 0.4 0.0 - 1.0 K/UL    ABS. EOSINOPHILS 0.1 0.0 - 0.4 K/UL    ABS. BASOPHILS 0.1 0.0 - 0.1 K/UL    ABS. IMM. GRANS. 0.0 0.00 - 0.04 K/UL    DF SMEAR SCANNED      RBC COMMENTS ANISOCYTOSIS  1+           Radiologic Studies -   No orders to display     CT Results  (Last 48 hours)    None        CXR Results  (Last 48 hours)    None            Medical Decision Making   I am the first provider for this patient. I reviewed the vital signs, available nursing notes, past medical history, past surgical history, family history and social history. Vital Signs-Reviewed the patient's vital signs. Patient Vitals for the past 24 hrs:   Temp Pulse Resp BP SpO2   06/18/22 0940 -- 87 20 (!) 165/98 97 %   06/18/22 0929 98.2 °F (36.8 °C) 87 14 (!) 154/89 98 %         Provider Notes (Medical Decision Making):   Potassium 2.0 in the ED, down from 2.2 yesterday. Given supplementation. She will be admitted for further management. EKG sinus, rate 86, nonspecific S T T changes, normal QT    ED Course:     Initial assessment performed.  The patients presenting problems have been discussed, and they are in agreement with the care plan formulated and outlined with them. I have encouraged them to ask questions as they arise throughout their visit. Disposition:  admit    PLAN:  1. Current Discharge Medication List        2.    Follow-up Information    None       Return to ED if worse     Diagnosis     Clinical Impression: hypokalemia

## 2022-06-18 NOTE — CONSULTS
Nephrology Consult Note     Perfecto Coleman     www. [facility]              Phone - [phone]   Patient: Abel Panchal   YOB: 1965    Date- 6/18/2022  MRN: [id number]             REASON FOR CONSULTATION: Hypokalemia  CONSULTING PHYSICIAN: Dr Claude Taylor PATIENT PCP:None     IMPRESSION & PLAN:    Severe hypokalemia   High suspicion of hyperaldosteronism   Metabolic alkalosis   Hypernatremia   Hypertension    PLAN-  · I ordered for renin Doni ratio, cortisol, urine for potassium, renal ultrasound to rule out adrenal adenoma. · I have added  runs of IV KCl 10 mg each x4 doses. · Also added stat magnesium level. · After getting magnesium level drawn we will give magnesium sulfate if needed. · I will add spironolactone 25 mg p.o. twice daily  · I also added p.o. potassium supplements. · Thanks much for this interesting consult. · Active Problems:  ·   Hypokalemia (7/6/2021)  ·   ·     [] High complexity decision making was performed  [] Patient is at high-risk of decompensation with multiple organ involvement    Subjective:   HPI: Abel Panchal is a 64 y.o. [address] [facility] female with past medical history history significant for hypertension, hyperaldosteronism diagnosed by my partner Dr. Zach Lopez in 2017. She used to be on very high dose of potassium supplements for last 10 years but then she lost follow-up. Further work-up was done that included renal artery angiogram that was negative for any renal artery stenosis. On multiple occasions she was found to have undetectable level of renin and severely elevated level of aldosterone. She then lost follow-up and went to [facility] and then after coming back she stopped taking her potassium pills almost a year ago. She was supposed to follow with a primary care physician but was unable to follow-up in a timely manner.   She recently was seen by them and had lab work done that that showed severe hypokalemia so she was referred to the ED for further evaluation. Patient has been complaining of persistent episodes of muscle cramps happening all over the body. Renal consult is requested for evaluation of severe hypokalemia. Review of Systems:      A 11 point review of system was performed today. Pertinent positives and negatives are mentioned in the HPI. The reminder of the ROS is negative and noncontributory. Past Medical History:   Diagnosis Date    Asthma     Bilateral breast cysts 2021    Bronchiectasis     Bronchiectasis ([address] 2019    Bruxism     Chronic bronchitis     Hypertension     Presbyopia of both eyes 2019    Uterine myoma 2020    Vertigo 3/2014      Past Surgical History:   Procedure Laterality Date    HX BACK SURGERY      HX  SECTION  1990    x1 w/ twins    HX LOBECTOMY      RML      Prior to Admission medications    Medication Sig Start Date End Date Taking? Authorizing Provider   losartan (COZAAR) 100 mg tablet Take 1 Tablet by mouth daily. 22   Mounika Gauthier MD   meclizine (ANTIVERT) 12.5 mg tablet TAKE 2 TABLETS BY MOUTH 3 TIMES A DAY AS NEEDED FOR DIZZINESS FOR UP TO 10 DAYS. 22   Provider, Historical   docosahexanoic acid-eicosapent 120-180 mg cap Take 1 Capsule by mouth daily. Provider, Historical   fremanezumab-vfrm (AJOVY) 225 mg/1.5 mL auto-injector 1.5 mL by SubCUTAneous route every thirty (30) days. Indications: migraine prevention 22   Gordon Aguilar MD   [facility]) 100 mg tablet Take one tablet at onset of migraine. Repeat one tablet in 2 hours if headache remains. Limit use to 2 days per week. 22   Gordon Aguilar MD   triamcinolone (Nasacort) 55 mcg nasal inhaler 2 Sprays by Both Nostrils route daily. 22   Francis Lanes, MD   ondansetron (ZOFRAN ODT) 4 mg disintegrating tablet Take 1 Tablet by mouth every eight (8) hours as needed for Nausea or Vomiting.  1/3/22 Pepper Newman, DO   mometasone (Asmanex Twisthaler) 220 mcg/ actuation (120) aepb inhaler Take 2 Puffs by inhalation two (2) times a day. 10/14/21   Christopher Gonzalez MD   levonorgestreL Rose Sos) 17.5 mcg/24 hrs (5 yrs) 19.5 mg IUD IUD 1 Device by IntraUTERine route once. Provider, Historical   montelukast (SINGULAIR) 10 mg tablet Take 10 mg by mouth daily. 8/9/21   Provider, Historical   metoprolol tartrate (LOPRESSOR) 100 mg IR tablet Take 25 mg by mouth daily. Provider, Historical   fluticasone propionate (FLONASE) 50 mcg/actuation nasal spray 2 Sprays by Both Nostrils route daily. Provider, Historical   ipratropium (ATROVENT HFA) 17 mcg/actuation inhaler 20 mcg by Other route every six (6) hours as needed for Wheezing. Provider, Historical   fish oil-omega-3 fatty acids 340-1,000 mg capsule Take 1 Capsule by mouth daily. Provider, Historical   cholecalciferol (Vitamin D3) (1000 Units /25 mcg) tablet Take 5,000 Units by mouth daily. Provider, Historical     Allergies   Allergen Reactions    Codeine Rash and Itching      Social History     Tobacco Use    Smoking status: Never Smoker    Smokeless tobacco: Never Used   Substance Use Topics    Alcohol use: No      Family History   Problem Relation Age of Onset    Hypertension Mother     Glaucoma Sister     Hypertension Sister         Objective:      Patient Vitals for the past 24 hrs:   Temp Pulse Resp BP SpO2   06/18/22 1319 99.2 °F (37.3 °C) 97 19 (!) 171/87 94 %   06/18/22 1200 -- 84 17 (!) 146/82 95 %   06/18/22 1030 -- 82 18 -- 96 %   06/18/22 0940 -- 87 20 (!) 165/98 97 %   06/18/22 0929 98.2 °F (36.8 °C) 87 14 (!) 154/89 98 %     No intake/output data recorded.   Last 3 Recorded Weights in this Encounter    06/18/22 0929   Weight: 63.5 kg (139 lb 15.9 oz)      Physical Exam:  General:Alert, No distress,   Eyes:No scleral icterus, No conjunctival pallor  Neck:Supple,no mass palpable,no thyromegaly  Lungs:Clears to auscultation Bilaterally, normal respiratory effort  CVS:RRR, S1 S2 normal,  No rub,  Abdomen:Soft, Non tender, No hepatosplenomegaly  Extremities: No LE edema  Skin:No rash or lesions, Warm and DRY   Psych: appropriate affect    : No Portillo  Musculoskeletal : no redness, no joint tenderness  NEURO: Normal Speech, Non focal deficit       CODE STATUS: Full  Care Plan discussed with: Patient     Chart reviewed. Yes Reviewed previous records   Yes Discussion with patient and/or family and questions answered       ECG[de-identified] Rev:yes  Xray/CT/US/MRI REV:yes  Lab Data Personally Reviewed: (see below)  Recent Labs     06/18/22  0947 06/17/22  1120   WBC 13.5* 7.1   HGB 14.4 13.9    221   ANEU 11.9* 4.2    143   K 2.0* 2.2*   * 69   BUN 11 11   CREA 0.71 0.59   ALT 41  --    TBILI 2.7*  --    AP 70  --    CA 9.5 9.8     Lab Results   Component Value Date/Time    Color YELLOW/STRAW 03/11/2014 07:40 AM    Appearance CLOUDY (A) 03/11/2014 07:40 AM    Specific gravity 1.013 03/11/2014 07:40 AM    pH (UA) 8.0 03/11/2014 07:40 AM    Protein NEGATIVE  03/11/2014 07:40 AM    Glucose 500 (A) 03/11/2014 07:40 AM    Ketone 40 (A) 03/11/2014 07:40 AM    Bilirubin NEGATIVE  03/11/2014 07:40 AM    Urobilinogen 0.2 03/11/2014 07:40 AM    Nitrites NEGATIVE  03/11/2014 07:40 AM    Leukocyte Esterase NEGATIVE  03/11/2014 07:40 AM    Epithelial cells FEW 03/11/2014 07:40 AM    Bacteria NEGATIVE  03/11/2014 07:40 AM    WBC 0-4 03/11/2014 07:40 AM    RBC 0-5 03/11/2014 07:40 AM       Lab Results   Component Value Date/Time    Iron 87 08/27/2010 04:06 PM    TIBC 368 08/27/2010 04:06 PM    Iron % saturation 24 08/27/2010 04:06 PM     No results found for: CULT  Prior to Admission Medications   Prescriptions Last Dose Informant Patient Reported? Taking? cholecalciferol (Vitamin D3) (1000 Units /25 mcg) tablet   Yes No   Sig: Take 5,000 Units by mouth daily.    docosahexanoic acid-eicosapent 120-180 mg cap   Yes No   Sig: Take 1 Capsule by mouth daily. fish oil-omega-3 fatty acids 340-1,000 mg capsule   Yes No   Sig: Take 1 Capsule by mouth daily. fluticasone propionate (FLONASE) 50 mcg/actuation nasal spray   Yes No   Si Sprays by Both Nostrils route daily. fremanezumab-vfrm (AJOVY) 225 mg/1.5 mL auto-injector   No No   Si.5 mL by SubCUTAneous route every thirty (30) days. Indications: migraine prevention   ipratropium (ATROVENT HFA) 17 mcg/actuation inhaler   Yes No   Si mcg by Other route every six (6) hours as needed for Wheezing. levonorgestreL (KYLEENA) 17.5 mcg/24 hrs (5 yrs) 19.5 mg IUD IUD   Yes No   Si Device by IntraUTERine route once. losartan (COZAAR) 100 mg tablet   No No   Sig: Take 1 Tablet by mouth daily. meclizine (ANTIVERT) 12.5 mg tablet   Yes No   Sig: TAKE 2 TABLETS BY MOUTH 3 TIMES A DAY AS NEEDED FOR DIZZINESS FOR UP TO 10 DAYS. metoprolol tartrate (LOPRESSOR) 100 mg IR tablet   Yes No   Sig: Take 25 mg by mouth daily. mometasone (Asmanex Twisthaler) 220 mcg/ actuation (120) aepb inhaler   No No   Sig: Take 2 Puffs by inhalation two (2) times a day. montelukast (SINGULAIR) 10 mg tablet   Yes No   Sig: Take 10 mg by mouth daily. ondansetron (ZOFRAN ODT) 4 mg disintegrating tablet   No No   Sig: Take 1 Tablet by mouth every eight (8) hours as needed for Nausea or Vomiting.   triamcinolone (Nasacort) 55 mcg nasal inhaler   No No   Si Sprays by Both Nostrils route daily. ubrogepant Ivette Fairy) 100 mg tablet   No No   Sig: Take one tablet at onset of migraine. Repeat one tablet in 2 hours if headache remains. Limit use to 2 days per week. Facility-Administered Medications: None     Imaging:    Medications list Personally Reviewed   [x]      Yes     []               No    Thank you for allowing us to participate in the care this patient. We will follow patient with you.   Signed By: Sarah Dc MD  [facility]  [address] [place] [address] Drive [address] [facility], [facility] [place], [address]  Phone - [phone]         Fax - [phone] [facility] Office  [address] [address]  Phone - [phone]        Fax - [phone]     [email or web address]

## 2022-06-18 NOTE — PROGRESS NOTES
End of Shift Note    Bedside shift change report given to Tanika Smith (oncoming nurse) by Theodore Oshea RN (offgoing nurse). Report included the following information SBAR and ED Summary    Shift worked:  DAYS   Shift summary and any significant changes:     NEW ADMIT       Concerns for physician to address:  NONE   Zone phone for oncoming shift:   [phone]     Patient Information  Winferd Kocher  64 y.o.  6/18/2022  9:30 AM by Bryant Mchugh MD. Winferd Kocher was admitted from Home    Problem List  Patient Active Problem List    Diagnosis Date Noted    Cervicalgia 01/26/2022    Anxiety 01/26/2022    Radiculopathy of cervical spine 01/26/2022    Stress 08/25/2021    Trapezius muscle spasm 08/25/2021    Migraine with vertigo 08/25/2021    Hypokalemia 07/06/2021    Vertigo 09/02/2014    Health care maintenance 09/02/2014    Bronchiectasis ([address] 11/12/2013    HTN (hypertension) 06/28/2011    Hepatitis A 09/17/2010    Chronic tension headaches 09/17/2010    Insomnia 09/17/2010    DDD (degenerative disc disease) 09/17/2010    [place] syndrome 09/01/2010     Past Medical History:   Diagnosis Date    Asthma     Bilateral breast cysts 02/2021    Bronchiectasis     Bronchiectasis ([address] 12/2019    Bruxism     Chronic bronchitis     Hypertension     Presbyopia of both eyes 01/2019    Uterine myoma 12/2020    Vertigo 3/2014       Core Measures:  CVA: No No  CHF:No No  PNA:No No    Activity:  Activity Level:  Up with Assistance  Number times ambulated in hallways past shift: 0  Number of times OOB to chair past shift: 0    Cardiac:   Cardiac Monitoring: Yes           Access:   Current line(s): PIV     Genitourinary:   Urinary status: voiding    Respiratory:   O2 Device: None (Room air)  Chronic home O2 use?: N/A  Incentive spirometer at bedside: N/A       GI:  Last Bowel Movement Date: 06/17/22  Current diet:  ADULT DIET Regular; Low Fat/Low Chol/High Fiber/PHILIP  Passing flatus: YES  Tolerating current diet: YES       Pain Management:   Patient states pain is manageable on current regimen: YES    Skin:  Damir Score: 19  Interventions: increase time out of bed    Patient Safety:  Fall Score:  Total Score: 1  Interventions: bed/chair alarm     @Rollbelt  @dexterity to release roll belt  Yes/No ( must document dexterity  here by stating Yes or No here, otherwise this is a restraint and must follow restraint documentation policy.)    DVT prophylaxis:  DVT prophylaxis Med- Yes  DVT prophylaxis SCD or JOB- No     Wounds: (If Applicable)  Wounds- No    Active Consults:  IP CONSULT TO HOSPITALIST  IP CONSULT TO NEPHROLOGY    Length of Stay:  Expected LOS: - - -  Actual LOS: 0  Discharge Plan: Lisa Cadet RN

## 2022-06-18 NOTE — ED NOTES
TRANSITION OF CARE - SBAR OUT    Patient is being transferred to \[facility]\"" [facility], Room# 3305. Report GIVEN TO Brayan Washington RN on Isabella De Leon for routine progression of care. Report is consisted of the following information SBAR, ED Summary, MAR and Recent Results. Patient transferred to receiving unit by: RN (RN or Tech Name)     Called outstanding consults: Yes   Collected routine labs: No      All current orders reviewed with accepting nurse: Yes    The following personal items will be sent with the patient during transfer to the floor:   All valuables:      Visual Aid: None                   CARDIAC MONITORING ORDERED: Yes     The following CURRENT information were reported to the receiving RN:    CODE STATUS: Full Code    NIH SCORE:    BRYANT SCREENING:      NEURO ASSESSMENT:        RESTRAINTS IN USE: No      IS DOCUMENTATION COMPLETE: Yes      Vital Signs  Level of Consciousness: Alert (0) (06/18/22 0929)  Temp: 98.2 °F (36.8 °C) (06/18/22 0929)  Temp Source: Oral (06/18/22 0929)  Pulse (Heart Rate): 84 (06/18/22 1200)  Resp Rate: 17 (06/18/22 1200)  BP: (!) 146/82 (06/18/22 1200)  MAP (Monitor): 102 (06/18/22 1200)  MAP (Calculated): 103 (06/18/22 1200)  BP 1 Location: Left upper arm (06/18/22 0929)  MEWS Score: 0 (06/18/22 0929)  Pain 1  Pain Scale 1: Numeric (0 - 10) (06/18/22 0929)  Pain Intensity 1: 0 (06/18/22 0929)      OXYGEN: Oxygen Therapy  O2 Device: None (Room air) (06/18/22 0929)    JYOTI FALL RISK: Racheal Kimball Fall Risk  Mobility: Ambulates without gait disturbance (06/18/22 0939)  Mentation: Alert, oriented x 3 (06/18/22 0939)  Medication: No anticonvulsants, sedatives(tranquilizers), psychotropics or hypnotics, hypoglycemics, narcotics, sleep aids, antihypertensives, laxatives, or diuretics (06/18/22 0939)  Standard Fall Precautions: Yes (06/18/22 0939)      WOUNDS: No      URINARY CATHETER: voiding    LINE ACCESS:   Peripheral IV 06/18/22 Left Antecubital (Active)        Opportunity for questions and clarification were provided.   Chetan Torres RN

## 2022-06-18 NOTE — PROGRESS NOTES
Physical Therapy  Orders received chart reviewed, upon entry patient with nausea then vomiting, alerted another RN due to inability to find current RN, and supplied all needs. Patient reported weakness recently due to being in bed and living alone, supportive son, patient has a home internationally as well as here in [address] [place] [address],[address], and normally travels back and forth. Will defer therapy evaluation today and follow back tomorrow as appropriate.   Thank you,  Vasu Knutson, PT

## 2022-06-18 NOTE — PROGRESS NOTES
Occupational Therapy: defer    Orders received chart reviewed, upon entry patient with nausea then vomiting, alerted another RN due to inability to find current RN, and supplied all needs. Patient reported weakness recently due to being in bed and living alone, supportive son, patient has a home internationally as well as here in [address] [place] [address],[address], and normally travels back and forth. Cuate Sue.  [place], MS OTR/L

## 2022-06-19 ENCOUNTER — APPOINTMENT (OUTPATIENT)
Dept: CT IMAGING | Age: 57
DRG: 720 | End: 2022-06-19
Attending: INTERNAL MEDICINE
Payer: MEDICAID

## 2022-06-19 ENCOUNTER — APPOINTMENT (OUTPATIENT)
Dept: MRI IMAGING | Age: 57
DRG: 720 | End: 2022-06-19
Attending: INTERNAL MEDICINE
Payer: MEDICAID

## 2022-06-19 ENCOUNTER — APPOINTMENT (OUTPATIENT)
Dept: GENERAL RADIOLOGY | Age: 57
DRG: 720 | End: 2022-06-19
Attending: GENERAL ACUTE CARE HOSPITAL
Payer: MEDICAID

## 2022-06-19 LAB
ALBUMIN SERPL-MCNC: 3.2 G/DL (ref 3.5–5)
ANION GAP SERPL CALC-SCNC: 2 MMOL/L (ref 5–15)
ATRIAL RATE: 86 BPM
B PERT DNA SPEC QL NAA+PROBE: NOT DETECTED
BASOPHILS # BLD: 0 K/UL (ref 0–0.1)
BASOPHILS NFR BLD: 0 % (ref 0–1)
BORDETELLA PARAPERTUSSIS PCR, BORPAR: NOT DETECTED
BUN SERPL-MCNC: 5 MG/DL (ref 6–20)
BUN/CREAT SERPL: 10 (ref 12–20)
C PNEUM DNA SPEC QL NAA+PROBE: NOT DETECTED
CALCIUM SERPL-MCNC: 8.3 MG/DL (ref 8.5–10.1)
CALCULATED R AXIS, ECG10: 70 DEGREES
CALCULATED T AXIS, ECG11: -31 DEGREES
CHLORIDE SERPL-SCNC: 104 MMOL/L (ref 97–108)
CO2 SERPL-SCNC: 33 MMOL/L (ref 21–32)
COMMENT, HOLDF: NORMAL
CORTIS AM PEAK SERPL-MCNC: 17.4 UG/DL (ref 4.3–22.45)
CREAT SERPL-MCNC: 0.51 MG/DL (ref 0.55–1.02)
DIAGNOSIS, 93000: NORMAL
DIFFERENTIAL METHOD BLD: ABNORMAL
EOSINOPHIL # BLD: 0 K/UL (ref 0–0.4)
EOSINOPHIL NFR BLD: 0 % (ref 0–7)
ERYTHROCYTE [DISTWIDTH] IN BLOOD BY AUTOMATED COUNT: 14.4 % (ref 11.5–14.5)
FLUAV H1 2009 PAND RNA SPEC QL NAA+PROBE: NOT DETECTED
FLUAV H1 RNA SPEC QL NAA+PROBE: NOT DETECTED
FLUAV H3 RNA SPEC QL NAA+PROBE: NOT DETECTED
FLUAV SUBTYP SPEC NAA+PROBE: NOT DETECTED
FLUBV RNA SPEC QL NAA+PROBE: NOT DETECTED
GLUCOSE SERPL-MCNC: 121 MG/DL (ref 65–100)
HADV DNA SPEC QL NAA+PROBE: NOT DETECTED
HCOV 229E RNA SPEC QL NAA+PROBE: NOT DETECTED
HCOV HKU1 RNA SPEC QL NAA+PROBE: NOT DETECTED
HCOV NL63 RNA SPEC QL NAA+PROBE: NOT DETECTED
HCOV OC43 RNA SPEC QL NAA+PROBE: NOT DETECTED
HCT VFR BLD AUTO: 35.4 % (ref 35–47)
HGB BLD-MCNC: 12.2 G/DL (ref 11.5–16)
HMPV RNA SPEC QL NAA+PROBE: NOT DETECTED
HPIV1 RNA SPEC QL NAA+PROBE: NOT DETECTED
HPIV2 RNA SPEC QL NAA+PROBE: NOT DETECTED
HPIV3 RNA SPEC QL NAA+PROBE: NOT DETECTED
HPIV4 RNA SPEC QL NAA+PROBE: NOT DETECTED
IMM GRANULOCYTES # BLD AUTO: 0.1 K/UL (ref 0–0.04)
IMM GRANULOCYTES NFR BLD AUTO: 1 % (ref 0–0.5)
LACTATE SERPL-SCNC: 0.7 MMOL/L (ref 0.4–2)
LYMPHOCYTES # BLD: 0.7 K/UL (ref 0.8–3.5)
LYMPHOCYTES NFR BLD: 6 % (ref 12–49)
M PNEUMO DNA SPEC QL NAA+PROBE: NOT DETECTED
MAGNESIUM SERPL-MCNC: 2.3 MG/DL (ref 1.6–2.4)
MCH RBC QN AUTO: 30 PG (ref 26–34)
MCHC RBC AUTO-ENTMCNC: 34.5 G/DL (ref 30–36.5)
MCV RBC AUTO: 87.2 FL (ref 80–99)
MONOCYTES # BLD: 0.5 K/UL (ref 0–1)
MONOCYTES NFR BLD: 4 % (ref 5–13)
NEUTS SEG # BLD: 11 K/UL (ref 1.8–8)
NEUTS SEG NFR BLD: 89 % (ref 32–75)
NRBC # BLD: 0 K/UL (ref 0–0.01)
NRBC BLD-RTO: 0 PER 100 WBC
P-R INTERVAL, ECG05: 152 MS
PHOSPHATE SERPL-MCNC: 1.5 MG/DL (ref 2.6–4.7)
PLATELET # BLD AUTO: 156 K/UL (ref 150–400)
PMV BLD AUTO: 10.7 FL (ref 8.9–12.9)
POTASSIUM SERPL-SCNC: 2.2 MMOL/L (ref 3.5–5.1)
PROCALCITONIN SERPL-MCNC: 0.26 NG/ML
Q-T INTERVAL, ECG07: 348 MS
QRS DURATION, ECG06: 80 MS
QTC CALCULATION (BEZET), ECG08: 416 MS
RBC # BLD AUTO: 4.06 M/UL (ref 3.8–5.2)
RBC MORPH BLD: ABNORMAL
RSV RNA SPEC QL NAA+PROBE: NOT DETECTED
RV+EV RNA SPEC QL NAA+PROBE: NOT DETECTED
SAMPLES BEING HELD,HOLD: NORMAL
SARS-COV-2 PCR, COVPCR: NOT DETECTED
SODIUM SERPL-SCNC: 139 MMOL/L (ref 136–145)
VENTRICULAR RATE, ECG03: 86 BPM
WBC # BLD AUTO: 12.3 K/UL (ref 3.6–11)

## 2022-06-19 PROCEDURE — 87077 CULTURE AEROBIC IDENTIFY: CPT

## 2022-06-19 PROCEDURE — 74011250637 HC RX REV CODE- 250/637: Performed by: INTERNAL MEDICINE

## 2022-06-19 PROCEDURE — 94761 N-INVAS EAR/PLS OXIMETRY MLT: CPT

## 2022-06-19 PROCEDURE — 0202U NFCT DS 22 TRGT SARS-COV-2: CPT

## 2022-06-19 PROCEDURE — 74011250636 HC RX REV CODE- 250/636: Performed by: INTERNAL MEDICINE

## 2022-06-19 PROCEDURE — 74183 MRI ABD W/O CNTR FLWD CNTR: CPT

## 2022-06-19 PROCEDURE — 74011250637 HC RX REV CODE- 250/637: Performed by: GENERAL ACUTE CARE HOSPITAL

## 2022-06-19 PROCEDURE — 71045 X-RAY EXAM CHEST 1 VIEW: CPT

## 2022-06-19 PROCEDURE — 87147 CULTURE TYPE IMMUNOLOGIC: CPT

## 2022-06-19 PROCEDURE — 36415 COLL VENOUS BLD VENIPUNCTURE: CPT

## 2022-06-19 PROCEDURE — 83735 ASSAY OF MAGNESIUM: CPT

## 2022-06-19 PROCEDURE — 97165 OT EVAL LOW COMPLEX 30 MIN: CPT | Performed by: OCCUPATIONAL THERAPIST

## 2022-06-19 PROCEDURE — 74011250637 HC RX REV CODE- 250/637: Performed by: HOSPITALIST

## 2022-06-19 PROCEDURE — 83605 ASSAY OF LACTIC ACID: CPT

## 2022-06-19 PROCEDURE — 97161 PT EVAL LOW COMPLEX 20 MIN: CPT

## 2022-06-19 PROCEDURE — 87186 SC STD MICRODIL/AGAR DIL: CPT

## 2022-06-19 PROCEDURE — 84145 PROCALCITONIN (PCT): CPT

## 2022-06-19 PROCEDURE — 97530 THERAPEUTIC ACTIVITIES: CPT

## 2022-06-19 PROCEDURE — 70450 CT HEAD/BRAIN W/O DYE: CPT

## 2022-06-19 PROCEDURE — A9577 INJ MULTIHANCE: HCPCS | Performed by: GENERAL ACUTE CARE HOSPITAL

## 2022-06-19 PROCEDURE — 74011000250 HC RX REV CODE- 250: Performed by: HOSPITALIST

## 2022-06-19 PROCEDURE — 85025 COMPLETE CBC W/AUTO DIFF WBC: CPT

## 2022-06-19 PROCEDURE — 74011250636 HC RX REV CODE- 250/636: Performed by: HOSPITALIST

## 2022-06-19 PROCEDURE — 87070 CULTURE OTHR SPECIMN AEROBIC: CPT

## 2022-06-19 PROCEDURE — 80069 RENAL FUNCTION PANEL: CPT

## 2022-06-19 PROCEDURE — 74011250636 HC RX REV CODE- 250/636: Performed by: PHYSICIAN ASSISTANT

## 2022-06-19 PROCEDURE — 65270000046 HC RM TELEMETRY

## 2022-06-19 PROCEDURE — 94640 AIRWAY INHALATION TREATMENT: CPT

## 2022-06-19 PROCEDURE — 74011250636 HC RX REV CODE- 250/636: Performed by: GENERAL ACUTE CARE HOSPITAL

## 2022-06-19 RX ORDER — LORAZEPAM 2 MG/ML
1 INJECTION INTRAMUSCULAR ONCE
Status: DISCONTINUED | OUTPATIENT
Start: 2022-06-19 | End: 2022-06-19 | Stop reason: SDUPTHER

## 2022-06-19 RX ORDER — SODIUM,POTASSIUM PHOSPHATES 280-250MG
2 POWDER IN PACKET (EA) ORAL 4 TIMES DAILY
Status: COMPLETED | OUTPATIENT
Start: 2022-06-19 | End: 2022-06-20

## 2022-06-19 RX ORDER — SPIRONOLACTONE 25 MG/1
50 TABLET ORAL 2 TIMES DAILY
Status: DISCONTINUED | OUTPATIENT
Start: 2022-06-19 | End: 2022-06-21 | Stop reason: HOSPADM

## 2022-06-19 RX ORDER — IPRATROPIUM BROMIDE AND ALBUTEROL SULFATE 2.5; .5 MG/3ML; MG/3ML
3 SOLUTION RESPIRATORY (INHALATION) 3 TIMES DAILY
Status: DISCONTINUED | OUTPATIENT
Start: 2022-06-19 | End: 2022-06-20

## 2022-06-19 RX ORDER — VANCOMYCIN/0.9 % SOD CHLORIDE 1.5G/250ML
1500 PLASTIC BAG, INJECTION (ML) INTRAVENOUS ONCE
Status: DISCONTINUED | OUTPATIENT
Start: 2022-06-19 | End: 2022-06-20

## 2022-06-19 RX ORDER — POTASSIUM CHLORIDE 7.45 MG/ML
10 INJECTION INTRAVENOUS
Status: COMPLETED | OUTPATIENT
Start: 2022-06-19 | End: 2022-06-19

## 2022-06-19 RX ORDER — POTASSIUM CHLORIDE 7.45 MG/ML
10 INJECTION INTRAVENOUS
Status: DISCONTINUED | OUTPATIENT
Start: 2022-06-19 | End: 2022-06-19

## 2022-06-19 RX ORDER — LEVOFLOXACIN 5 MG/ML
750 INJECTION, SOLUTION INTRAVENOUS EVERY 24 HOURS
Status: DISCONTINUED | OUTPATIENT
Start: 2022-06-19 | End: 2022-06-19

## 2022-06-19 RX ORDER — CIPROFLOXACIN 500 MG/1
500 TABLET ORAL EVERY 12 HOURS
Status: DISCONTINUED | OUTPATIENT
Start: 2022-06-19 | End: 2022-06-19

## 2022-06-19 RX ORDER — METRONIDAZOLE 500 MG/100ML
500 INJECTION, SOLUTION INTRAVENOUS EVERY 12 HOURS
Status: DISCONTINUED | OUTPATIENT
Start: 2022-06-19 | End: 2022-06-21

## 2022-06-19 RX ORDER — LORAZEPAM 2 MG/ML
1 INJECTION INTRAMUSCULAR DAILY PRN
Status: COMPLETED | OUTPATIENT
Start: 2022-06-19 | End: 2022-06-19

## 2022-06-19 RX ORDER — HYDROMORPHONE HYDROCHLORIDE 1 MG/ML
0.5 INJECTION, SOLUTION INTRAMUSCULAR; INTRAVENOUS; SUBCUTANEOUS ONCE
Status: COMPLETED | OUTPATIENT
Start: 2022-06-19 | End: 2022-06-19

## 2022-06-19 RX ADMIN — GADOBENATE DIMEGLUMINE 13 ML: 529 INJECTION, SOLUTION INTRAVENOUS at 19:27

## 2022-06-19 RX ADMIN — POTASSIUM BICARBONATE 40 MEQ: 782 TABLET, EFFERVESCENT ORAL at 15:00

## 2022-06-19 RX ADMIN — CIPROFLOXACIN 500 MG: 500 TABLET, FILM COATED ORAL at 17:47

## 2022-06-19 RX ADMIN — POTASSIUM CHLORIDE 10 MEQ: 10 INJECTION, SOLUTION INTRAVENOUS at 20:00

## 2022-06-19 RX ADMIN — ONDANSETRON 4 MG: 2 INJECTION INTRAMUSCULAR; INTRAVENOUS at 14:40

## 2022-06-19 RX ADMIN — POTASSIUM CHLORIDE 10 MEQ: 10 INJECTION, SOLUTION INTRAVENOUS at 02:02

## 2022-06-19 RX ADMIN — POTASSIUM CHLORIDE AND SODIUM CHLORIDE: 900; 300 INJECTION, SOLUTION INTRAVENOUS at 06:20

## 2022-06-19 RX ADMIN — POTASSIUM CHLORIDE 10 MEQ: 10 INJECTION, SOLUTION INTRAVENOUS at 03:00

## 2022-06-19 RX ADMIN — ACETAMINOPHEN 650 MG: 325 TABLET ORAL at 20:44

## 2022-06-19 RX ADMIN — POTASSIUM & SODIUM PHOSPHATES POWDER PACK 280-160-250 MG 2 PACKET: 280-160-250 PACK at 15:00

## 2022-06-19 RX ADMIN — POTASSIUM CHLORIDE 10 MEQ: 10 INJECTION, SOLUTION INTRAVENOUS at 17:51

## 2022-06-19 RX ADMIN — SODIUM CHLORIDE, PRESERVATIVE FREE 10 ML: 5 INJECTION INTRAVENOUS at 05:11

## 2022-06-19 RX ADMIN — HYDROMORPHONE HYDROCHLORIDE 0.5 MG: 1 INJECTION, SOLUTION INTRAMUSCULAR; INTRAVENOUS; SUBCUTANEOUS at 04:25

## 2022-06-19 RX ADMIN — MONTELUKAST 10 MG: 10 TABLET, FILM COATED ORAL at 09:07

## 2022-06-19 RX ADMIN — SODIUM CHLORIDE, PRESERVATIVE FREE 10 ML: 5 INJECTION INTRAVENOUS at 16:27

## 2022-06-19 RX ADMIN — SPIRONOLACTONE 25 MG: 25 TABLET ORAL at 09:07

## 2022-06-19 RX ADMIN — POTASSIUM & SODIUM PHOSPHATES POWDER PACK 280-160-250 MG 2 PACKET: 280-160-250 PACK at 17:48

## 2022-06-19 RX ADMIN — LORAZEPAM 1 MG: 2 INJECTION INTRAMUSCULAR; INTRAVENOUS at 18:58

## 2022-06-19 RX ADMIN — ACETAMINOPHEN 650 MG: 325 TABLET ORAL at 09:11

## 2022-06-19 RX ADMIN — LOSARTAN POTASSIUM 100 MG: 100 TABLET, FILM COATED ORAL at 09:07

## 2022-06-19 RX ADMIN — POTASSIUM CHLORIDE 10 MEQ: 10 INJECTION, SOLUTION INTRAVENOUS at 22:00

## 2022-06-19 RX ADMIN — BUDESONIDE 500 MCG: 0.5 INHALANT RESPIRATORY (INHALATION) at 10:35

## 2022-06-19 RX ADMIN — POTASSIUM BICARBONATE 20 MEQ: 782 TABLET, EFFERVESCENT ORAL at 09:06

## 2022-06-19 RX ADMIN — SPIRONOLACTONE 50 MG: 25 TABLET ORAL at 17:47

## 2022-06-19 RX ADMIN — FLUTICASONE PROPIONATE 2 SPRAY: 50 SPRAY, METERED NASAL at 09:05

## 2022-06-19 RX ADMIN — ACETAMINOPHEN 650 MG: 325 TABLET ORAL at 14:39

## 2022-06-19 NOTE — PROGRESS NOTES
PHYSICAL THERAPY EVALUATION/DISCHARGE  Patient: Shalonda Thao (51 y.o. female)  Date: 6/19/2022  Primary Diagnosis: Hypokalemia [E87.6]       Precautions: Fall         ASSESSMENT  Based on the objective data described below, the patient presents from home with reports of low potassium labs at outpatient clinic, referred to ED by PCP. Pt received for PT evaluation in supine, agreeable to session. She reports she is at her independent baseline but endorses posterior headache. She demonstrated bed mobility mod I with rail. Sit <> stands independent and gait up to 150ft independent without AD. Pt required therapist to manage IV pole but no assist or AD required for gait, no gait abnormalities noted. Pt is at her functional baseline and does not require further acute care PT services. Cleared pt up ad christine in room and RN notified. Will sign off. Functional Outcome Measure: The patient scored 100/100 on the Barthel Index outcome measure which is indicative of complete independence in mobility and ADLs. Other factors to consider for discharge: lives alone in 2 story home. No mobility deficits noted. Further skilled acute physical therapy is not indicated at this time. PLAN :  Recommendation for discharge: (in order for the patient to meet his/her long term goals)  No skilled physical therapy/ follow up rehabilitation needs identified at this time. This discharge recommendation:  Has been made in collaboration with the attending provider and/or case management    IF patient discharges home will need the following DME: none       SUBJECTIVE:   Patient stated I just get dizzy occasionally but that's been happening for a while.     OBJECTIVE DATA SUMMARY:   HISTORY:    Past Medical History:   Diagnosis Date    Asthma     Bilateral breast cysts 02/2021    Bronchiectasis     Bronchiectasis ([address] 12/2019    Bruxism     Chronic bronchitis     Hypertension     Presbyopia of both eyes 01/2019    Uterine myoma 2020    Vertigo 3/2014     Past Surgical History:   Procedure Laterality Date    HX BACK SURGERY  2006    HX  SECTION  1990    x1 w/ twins    HX LOBECTOMY      RML       Prior level of function: independent, no device. Personal factors and/or comorbidities impacting plan of care: highly motivated, supportive family present on eval    Home Situation  Home Environment: Private residence  One/Two Story Residence: Two story, live on 1st floor  Living Alone: No  Support Systems: Child(sandee)  Patient Expects to be Discharged to[de-identified] Home  Current DME Used/Available at Home: None    EXAMINATION/PRESENTATION/DECISION MAKING:   Critical Behavior:  Neurologic State: Alert  Orientation Level: Oriented X4  Cognition: Appropriate decision making,Appropriate for age attention/concentration,Appropriate safety awareness,Follows commands  Safety/Judgement: Awareness of environment,Insight into deficits  Hearing: Auditory  Auditory Impairment: Hard of hearing, bilateral  Skin:  Appears intact  Edema: none noted  Range Of Motion:  AROM: Within functional limits                       Strength:    Strength:  Within functional limits                    Tone & Sensation:   Tone: Normal              Sensation: Intact               Coordination:  Coordination: Within functional limits  Vision:   Acuity: Within Defined Limits  Functional Mobility:  Bed Mobility:  Rolling: Modified independent  Supine to Sit: Modified independent  Sit to Supine: Independent  Scooting: Independent  Transfers:  Sit to Stand: Independent  Stand to Sit: Independent        Bed to Chair: Independent              Balance:   Sitting: Intact  Standing: Intact  Ambulation/Gait Training:  Distance (ft): 150 Feet (ft)     Ambulation - Level of Assistance: Independent                 Base of Support: Narrowed     Speed/Shannon: Slow  Step Length: Left shortened;Right shortened                      Functional Measure:  Barthel Index:    Bathin  Bladder: 10  Bowels: 10  Groomin  Dressing: 10  Feeding: 10  Mobility: 15  Stairs: 10  Toilet Use: 10  Transfer (Bed to Chair and Back): 15  Total: 100/100       The Barthel ADL Index: Guidelines  1. The index should be used as a record of what a patient does, not as a record of what a patient could do. 2. The main aim is to establish degree of independence from any help, physical or verbal, however minor and for whatever reason. 3. The need for supervision renders the patient not independent. 4. A patient's performance should be established using the best available evidence. Asking the patient, friends/relatives and nurses are the usual sources, but direct observation and common sense are also important. However direct testing is not needed. 5. Usually the patient's performance over the preceding 24-48 hours is important, but occasionally longer periods will be relevant. 6. Middle categories imply that the patient supplies over 50 per cent of the effort. 7. Use of aids to be independent is allowed. Score Interpretation (from [address] [address])    Independent   60-79 Minimally independent   40-59 Partially dependent   20-39 Very dependent   <20 Totally dependent     -Debra Baker., Barthel, DJoshW. (1965). Functional evaluation: the Barthel Index. [address] [facility] ([address] (1997). The Barthel activities of daily living index: self-reporting versus actual performance in the old (> or = 75 years). Journal [address] 45(7), 14 [facility], J.J.MJoshF, Angela Turner, Abdoulaye Velez (). Measuring the change in disability after inpatient rehabilitation; comparison of the responsiveness of the Barthel Index and Functional [place] Measure. Journal of Neurology, Neurosurgery, and Psychiatry, 66(4), 850-706.   -Deejay Spann, N.RICHY.JOHNATHON, ROCKY Casey, & Kacey Shea MEVAN. (2004) Assessment of post-stroke quality of life in cost-effectiveness studies: The usefulness of the Barthel Index and the EuroQoL-5D. Quality of Life Research, 15, 025-00          Physical Therapy Evaluation Charge Determination   History Examination Presentation Decision-Making   LOW Complexity : Zero comorbidities / personal factors that will impact the outcome / POC LOW Complexity : 1-2 Standardized tests and measures addressing body structure, function, activity limitation and / or participation in recreation  LOW Complexity : Stable, uncomplicated  LOW Complexity : FOTO score of       Based on the above components, the patient evaluation is determined to be of the following complexity level: LOW     Pain Rating:  Denies. Endorses headache and provided with ice pack for posterior use, 15min on/15 off to preserve skin. Activity Tolerance: WNL      After treatment patient left in no apparent distress:   Supine in bed, Call bell within reach, Caregiver / family present and Side rails x 3    COMMUNICATION/EDUCATION:   The patients plan of care was discussed with: Occupational therapist and Registered nurse. Fall prevention education was provided and the patient/caregiver indicated understanding.     Thank you for this referral.  Jailyn Blackburn, PT, DPT, NCS   Time Calculation: 13 mins

## 2022-06-19 NOTE — PROGRESS NOTES
End of Shift Note    Bedside shift change report given to 18 Radha Smith (oncoming nurse) by Cele Gutierrez RN (offgoing nurse). Report included the following information SBAR and ED Summary    Shift worked:  DAYS   Shift summary and any significant changes:     -POSSIBLE COVID   -POTASSIUM HUNG LATE DUE TO PATIENT REFUSING ADDITIONAL IV PLACEMENT AND DUE TO PATIENT REQUESTING A BREAK FROM EVERYTHING       Concerns for physician to address:  NONE   Zone phone for oncoming shift:   [phone]     Patient Information  Roberto Flores  64 y.o.  6/18/2022  9:30 AM by Nargis Lopez MD. Roberto Flores was admitted from Home    Problem List  Patient Active Problem List    Diagnosis Date Noted    Cervicalgia 01/26/2022    Anxiety 01/26/2022    Radiculopathy of cervical spine 01/26/2022    Stress 08/25/2021    Trapezius muscle spasm 08/25/2021    Migraine with vertigo 08/25/2021    Hypokalemia 07/06/2021    Vertigo 09/02/2014    Health care maintenance 09/02/2014    Bronchiectasis ([facility] [address] 11/12/2013    HTN (hypertension) 06/28/2011    Hepatitis A 09/17/2010    Chronic tension headaches 09/17/2010    Insomnia 09/17/2010    DDD (degenerative disc disease) 09/17/2010    [place] syndrome 09/01/2010     Past Medical History:   Diagnosis Date    Asthma     Bilateral breast cysts 02/2021    Bronchiectasis     Bronchiectasis ([facility] [address] 12/2019    Bruxism     Chronic bronchitis     Hypertension     Presbyopia of both eyes 01/2019    Uterine myoma 12/2020    Vertigo 3/2014       Core Measures:  CVA: No No  CHF:No No  PNA:No No    Activity:  Activity Level:  Up with Assistance  Number times ambulated in hallways past shift: 0  Number of times OOB to chair past shift: 0    Cardiac:   Cardiac Monitoring: Yes      Cardiac Rhythm: Sinus Rhythm    Access:   Current line(s): PIV     Genitourinary:   Urinary status: voiding    Respiratory:   O2 Device: None (Room air)  Chronic home O2 use?: N/A  Incentive spirometer at bedside: N/A       GI:  Last Bowel Movement Date: 06/18/22  Current diet:  ADULT DIET Regular; Low Fat/Low Chol/High Fiber/PHILIP  Passing flatus: YES  Tolerating current diet: YES       Pain Management:   Patient states pain is manageable on current regimen: YES    Skin:  Damir Score: 22  Interventions: increase time out of bed    Patient Safety:  Fall Score:  Total Score: 2  Interventions: bed/chair alarm     @Rollbelt  @dexterity to release roll belt  Yes/No ( must document dexterity  here by stating Yes or No here, otherwise this is a restraint and must follow restraint documentation policy.)    DVT prophylaxis:  DVT prophylaxis Med- Yes  DVT prophylaxis SCD or JOB- No     Wounds: (If Applicable)  Wounds- No    Active Consults:  IP CONSULT TO HOSPITALIST  IP CONSULT TO NEPHROLOGY    Length of Stay:  Expected LOS: - - -  Actual LOS: 1  Discharge Plan: Noemi Greenberg RN

## 2022-06-19 NOTE — PROGRESS NOTES
Physical Therapy    Chart reviewed and pt cleared to mobilize by nursing. Pt seen for eval and is at her independent baseline, required assist only for managing IV pole. Will sign off, full eval/dc note to follow.     Roselia Jones PT, DPT, NCS

## 2022-06-19 NOTE — PROGRESS NOTES
OCCUPATIONAL THERAPY EVALUATION/DISCHARGE  Patient: Evelina Jones (41 y.o. female)  Date: 2022  Primary Diagnosis: Hypokalemia [E87.6]       Precautions: none       ASSESSMENT  Based on the objective data described below, the patient presents with good overall strength, balance, ROM and activity tolerance for the performance of functional activity. Overall she is performing ADLs and functional mobility at her independent baseline. She without further OT needs acutely and after discharge. Functional Outcome Measure: The patient scored 100/100 on the Barthel Index outcome measure which is indicative of a 0% decline in function. PLAN :  Recommendation for discharge: (in order for the patient to meet his/her long term goals)  No skilled occupational therapy/ follow up rehabilitation needs identified at this time. Equipment recommendations for successful discharge: none. OBJECTIVE DATA SUMMARY:   HISTORY:   Past Medical History:   Diagnosis Date    Asthma     Bilateral breast cysts 2021    Bronchiectasis     Bronchiectasis ([address] 2019    Bruxism     Chronic bronchitis     Hypertension     Presbyopia of both eyes 2019    Uterine myoma 2020    Vertigo 3/2014     Past Surgical History:   Procedure Laterality Date    HX BACK SURGERY      HX  SECTION  1990    x1 w/ twins    HX LOBECTOMY      RML       Prior Level of Function/Environment/Context: Independent with ADLs, IADLs and functional mobility.    Expanded or extensive additional review of patient history:   Home Situation  Home Environment: Private residence  One/Two Story Residence: Two story, live on 1st floor  Living Alone: No  Support Systems: Child(sandee)  Patient Expects to be Discharged to[de-identified] Home  Current DME Used/Available at Home: None    Hand dominance: Right    EXAMINATION OF PERFORMANCE DEFICITS:  Cognitive/Behavioral Status:  Neurologic State: Alert  Orientation Level: Oriented X4  Cognition: Appropriate decision making; Appropriate for age attention/concentration; Appropriate safety awareness; Follows commands        Safety/Judgement: Awareness of environment; Insight into deficits    Hearing: Auditory  Auditory Impairment: Hard of hearing, bilateral    Vision/Perceptual:    Acuity: Within Defined Limits         Range of Motion:  AROM: Within functional limits    Strength:  Strength: Within functional limits  Coordination:  Coordination: Within functional limits  Fine Motor Skills-Upper: Left Intact; Right Intact    Gross Motor Skills-Upper: Left Intact; Right Intact    Tone & Sensation:  Tone: Normal  Sensation: Intact    Balance:  Sitting: Intact  Standing: Intact    Functional Mobility and Transfers for ADLs:  Bed Mobility:  Rolling: Modified independent  Supine to Sit: Modified independent  Sit to Supine: Independent  Scooting: Independent    Transfers:  Sit to Stand: Independent  Stand to Sit: Independent  Bed to Chair: Independent  Bathroom Mobility: Independent  Toilet Transfer : Independent    ADL Assessment:  Feeding: Independent    Oral Facial Hygiene/Grooming: Independent    Bathing: Independent    Upper Body Dressing: Independent    Lower Body Dressing: Independent    Toileting: Independent       Functional Measure:  Barthel Index:    Bathin  Bladder: 10  Bowels: 10  Groomin  Dressing: 10  Feeding: 10  Mobility: 15  Stairs: 10  Toilet Use: 10  Transfer (Bed to Chair and Back): 15  Total: 100/100        The Barthel ADL Index: Guidelines  1. The index should be used as a record of what a patient does, not as a record of what a patient could do. 2. The main aim is to establish degree of independence from any help, physical or verbal, however minor and for whatever reason. 3. The need for supervision renders the patient not independent. 4. A patient's performance should be established using the best available evidence.  Asking the patient, friends/relatives and nurses are the usual sources, but direct observation and common sense are also important. However direct testing is not needed. 5. Usually the patient's performance over the preceding 24-48 hours is important, but occasionally longer periods will be relevant. 6. Middle categories imply that the patient supplies over 50 per cent of the effort. 7. Use of aids to be independent is allowed. Monroe Pozo., Barthel, D.W. (4666). Functional evaluation: the Barthel Index. [address] [facility] (14)2. Nicholas Rust sissy ARABELLA Trinh, Zo Pagan., Marva Noel., Anny, [address] [facility] (1999). Measuring the change indisability after inpatient rehabilitation; comparison of the responsiveness of the Barthel Index and Functional [place] Measure. Journal of Neurology, Neurosurgery, and Psychiatry, 66(4), 873-093. CORDELIA Amaya, ROCKY Miles, & Jesus Pittman M.A. (2004.) Assessment of post-stroke quality of life in cost-effectiveness studies: The usefulness of the Barthel Index and the EuroQoL-5D. Quality of Life Research, 13, 427-43      Pain Rating:  No complaint of pain    Activity Tolerance:   Good      After treatment patient left in no apparent distress:    Supine in bed, Call bell within reach and Caregiver / family present    COMMUNICATION/EDUCATION:   The patients plan of care was discussed with: Physical Therapist and Registered Nurse.     Thank you for this referral.  Cl Lopez, OTR/L  Time Calculation: 10 mins

## 2022-06-19 NOTE — PROGRESS NOTES
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 potassium  Repeat potassium later today and in the morning. Nephrology on board  MRI abd pending      Asthma/bronchiectasis   Continue home medications monitor clinically  Check CXR, Procal, resp viral panel, resp Cx  Start Cipro, if w/u neg then may DC Abx    HTN- continue on home medications, monitor BP    Chronic vertigo- continue meclizine for the as needed dizziness    Chronic migraines- monitor on home medications    18.5 - 24.9 Normal weight / Body mass index is 20.67 kg/m². Code status: Full  Prophylaxis: Lovenox  Recommended Disposition: Home w/Family  Anticipated Discharge Date:  >48 hours        Subjective:     Discussed with RN events overnight. Wet cough, greenish sputum  Fever  Chills  Poor appetite    Review of Systems:  Symptom Y/N Comments  Symptom Y/N Comments   Fever/Chills    Chest Pain     Poor Appetite    Edema     Cough    Abdominal Pain     Sputum    Joint Pain     SOB/GRACE    Pruritis/Rash     Nausea/vomit    Tolerating PT/OT     Diarrhea    Tolerating Diet     Constipation    Other       PO intake: No data found.     Wt Readings from Last 10 Encounters:   22 63.5 kg (139 lb 15.9 oz)   22 64.4 kg (142 lb)   22 64.4 kg (142 lb)   22 64.4 kg (142 lb)   22 67.7 kg (149 lb 3.2 oz)   22 66 kg (145 lb 9.6 oz)   22 67.1 kg (148 lb)   22 68.7 kg (151 lb 6.4 oz)   21 71.2 kg (157 lb)   21 71.4 kg (157 lb 6.4 oz) Objective:     VITALS:   Last 24hrs VS reviewed since prior progress note. Most recent are:  Patient Vitals for the past 24 hrs:   Temp Pulse Resp BP SpO2   06/19/22 1157 -- 98 -- -- --   06/19/22 1125 99.5 °F (37.5 °C) 98 16 (!) 143/76 93 %   06/19/22 1035 -- -- -- -- 96 %   06/19/22 0800 -- 97 -- -- --   06/19/22 0755 99.7 °F (37.6 °C) 100 17 139/83 --   06/19/22 0423 99.6 °F (37.6 °C) 97 18 (!) 148/74 95 %   06/18/22 2345 99.8 °F (37.7 °C) (!) 102 18 136/78 96 %   06/18/22 2016 99.2 °F (37.3 °C) (!) 104 20 (!) 154/81 94 %   06/18/22 2014 -- -- -- -- 95 %   06/18/22 1559 99 °F (37.2 °C) 88 18 (!) 148/84 95 %     No intake or output data in the 24 hours ending 06/19/22 1404     I had a face to face encounter, and independently examined this patient on 6/19/2022, as outlined below:    PHYSICAL EXAM:  General:    No distress     HEENT: Atraumatic, anicteric sclerae, pink conjunctivae, MMM  Neck:  Supple, symmetrical  Lungs:   CTA. No Wheezing/Rhonchi. No rales. No tenderness. No Accessory muscle use. Heart:   Regular rhythm. No murmur. No JVD   GI/:   Soft. NT. ND. BS normal  Extremities: No edema. No cyanosis. No clubbing. Skin:     Not pale. Not Jaundiced. No rashes   Psych:  Good insight. Not depressed. Not anxious or agitated. Neurologic: Alert and oriented X 4. EOMs intact. No facial asymmetry. No slurred speech. Symmetrical strength, Sensation grossly intact. Labs     I reviewed today's most current labs and imaging studies.   Pertinent labs include:  Recent Labs     06/19/22  0313 06/18/22  0947 06/17/22  1120   WBC 12.3* 13.5* 7.1   HGB 12.2 14.4 13.9   HCT 35.4 42.1 43.5    220 221     Recent Labs     06/19/22  0313 06/18/22  1814 06/18/22  1640 06/18/22  0947    141  --  140   K 2.2* 2.3*  --  2.0*    99  --  98   CO2 33* 36*  --  37*   * 163*  --  147*   BUN 5* 8  --  11   CREA 0.51* 0.72  --  0.71   CA 8.3* 8.9  --  9.5   MG 2.3  --  1.8  --    PHOS 1.5*  --   -- --    ALB 3.2*  --   --  4.1   TBILI  --   --   --  2.7*   ALT  --   --   --  41     CT HEAD WO CONT    Result Date: 6/19/2022  No acute intracranial abnormality. CT HEAD WO CONT    Result Date: 6/19/2022  No acute intracranial abnormality.          All Micro Results     None            Current Medications:     Current Facility-Administered Medications:     spironolactone (ALDACTONE) tablet 50 mg, 50 mg, Oral, BID, Michel Carrera MD    ubrogepant (UBRELVY) tablet 100 mg (Patient Supplied), 100 mg, Oral, ONCE PRN, Ej Gonzalez MD    potassium chloride 10 mEq in 100 ml IVPB, 10 mEq, IntraVENous, Q2H, Michel Carrera MD, Last Rate: 100 mL/hr at 06/19/22 1253, 10 mEq at 06/19/22 1253    potassium bicarb-citric acid (EFFER-K) tablet 40 mEq, 40 mEq, Oral, Q8H, Michel Carrera MD    0.9% sodium chloride with KCl 40 mEq/L infusion, , IntraVENous, CONTINUOUS, Michel Carrera MD, Last Rate: 75 mL/hr at 06/19/22 0620, New Bag at 06/19/22 0620    cholecalciferol (VITAMIN D3) (1000 Units /25 mcg) tablet 5,000 Units, 5,000 Units, Oral, DAILY, Ernesto Perez MD    fluticasone propionate (FLONASE) 50 mcg/actuation nasal spray 2 Spray, 2 Spray, Both Nostrils, DAILY, Ernesto Perez MD, 2 [place] at 06/19/22 0905    ipratropium (ATROVENT HFA) 17 mcg inhaler, 1 Puff, Inhalation, Q6H PRN, Ernesto Perez MD    losartan (COZAAR) tablet 100 mg, 100 mg, Oral, DAILY, Ernesto Perez MD, 100 mg at 06/19/22 0907    meclizine (ANTIVERT) tablet 25 mg, 25 mg, Oral, Q6H PRN, Ernesto Perez MD    metoprolol tartrate (LOPRESSOR) tablet 25 mg, 25 mg, Oral, DAILY, Ernesto Perez MD    budesonide (PULMICORT) 500 mcg/2 ml nebulizer suspension, 500 mcg, Nebulization, BID, Ernesto Perez MD, 500 mcg at 06/19/22 1035    montelukast (SINGULAIR) tablet 10 mg, 10 mg, Oral, DAILY, Ernesto Perez MD, 10 mg at 06/19/22 0907    sodium chloride (NS) flush 5-40 mL, 5-40 mL, IntraVENous, Q8H, Ernesto Perez MD, 10 mL at 06/19/22 0335    sodium chloride (NS) flush 5-40 mL, 5-40 mL, IntraVENous, PRN, Ernesto Perez MD, 10 mL at 06/18/22 1351    acetaminophen (TYLENOL) tablet 650 mg, 650 mg, Oral, Q6H PRN, 650 mg at 06/19/22 0911 **OR** acetaminophen (TYLENOL) suppository 650 mg, 650 mg, Rectal, Q6H PRN, Ernesto Perez MD    polyethylene glycol (MIRALAX) packet 17 g, 17 g, Oral, DAILY PRN, Ernesto Perez MD    ondansetron (ZOFRAN ODT) tablet 4 mg, 4 mg, Oral, Q8H PRN **OR** ondansetron (ZOFRAN) injection 4 mg, 4 mg, IntraVENous, Q6H PRN, Ernesto Perez MD, 4 mg at 06/18/22 1843    enoxaparin (LOVENOX) injection 40 mg, 40 mg, SubCUTAneous, DAILY, Ernesto Perez MD    aluminum-magnesium hydroxide (MAALOX) oral suspension 15 mL, 15 mL, Oral, QID PRN, Ernesto Perez MD     Procedures: see electronic medical records for all procedures/Xrays and details which were not copied into this note but were reviewed prior to creation of Plan. Reviewed most current lab test results and cultures  YES  Reviewed most current radiology test results   YES  Review and summation of old records today    NO  Reviewed patient's current orders and MAR    YES  PMH/ reviewed - no change compared to H&P  ________________________________________________________________________  Care Plan discussed with:    Comments   Patient x    Family      RN x    Care Manager     Consultant                        Multidiciplinary team rounds were held today with , nursing, pharmacist and clinical coordinator. Patient's plan of care was discussed; medications were reviewed and discharge planning was addressed.      ________________________________________________________________________  Total NON critical care TIME:  40   Minutes    Total CRITICAL CARE TIME Spent:   Minutes non procedure based      Comments   >50% of visit spent in counseling and coordination of care x     This includes time during multidisciplinary rounds if indicated above ________________________________________________________________________  Phill Sandor, MD

## 2022-06-19 NOTE — PROGRESS NOTES
-Please complete MRI History and Safety Screening Form   - Patient cannot be scanned until this form is completed, including signatures, and reviewed in MRI to ensure patient is SAFE and eligible for MRI. - CALL MRI when this has been successfully completed at [phone].

## 2022-06-19 NOTE — PROGRESS NOTES
Nephrology Progress Note  Perfecto Coleman     [email or web address]. [email or web address]  Phone - [phone]   Patient: Archana Bae    YOB: 1965        Date- 6/19/2022   Admit Date: 6/18/2022  CC: Follow up for  hyperaldosteronism          IMPRESSION & PLAN:   · Severe hypokalemia  · High suspicion of hyperaldosteronism  · Metabolic alkalosis  · Hypernatremia   Hypertension     PLAN-   Increase Effer-K to 40 meq p.o. 3 times daily   Add 4 more rounds of KCl 10 mg each IV   Increase spironolactone to 50 mg twice daily   Continue with IV fluids with 40 of KCl.  Check labs twice daily   Place order for MRI of the abdomen to rule out adrenal adenoma. Subjective: Interval History:   -Seen and examined today. -Remains hypokalemic  -Alkalosis better    Objective:   Vitals:    06/19/22 0800 06/19/22 1035 06/19/22 1125 06/19/22 1157   BP:   (!) 143/76    Pulse: 97  98 98   Resp:   16    Temp:   99.5 °F (37.5 °C)    SpO2:  96% 93%    Weight:       Height:          No intake/output data recorded. Last 3 Recorded Weights in this Encounter    06/18/22 0929   Weight: 63.5 kg (139 lb 15.9 oz)      Physical exam:    GEN: NAD  NECK- Supple, no mass  RESP: No wheezing, Clear b/l  CVS: S1,S2  RRR  NEURO: Normal speech, Non focal  EXT: No Edema   PSYCH: Normal Mood    Chart reviewed. Pertinent Notes reviewed. Data Review :  Recent Labs     06/19/22  0313 06/18/22  1814 06/18/22  1640 06/18/22  0947    141  --  140   K 2.2* 2.3*  --  2.0*    99  --  98   CO2 33* 36*  --  37*   BUN 5* 8  --  11   CREA 0.51* 0.72  --  0.71   * 163*  --  147*   CA 8.3* 8.9  --  9.5   MG 2.3  --  1.8  --    PHOS 1.5*  --   --   --      Recent Labs     06/19/22  0313 06/18/22  0947 06/17/22  1120   WBC 12.3* 13.5* 7.1   HGB 12.2 14.4 13.9   HCT 35.4 42.1 43.5    220 221     No results for input(s): FE, TIBC, PSAT, FERR in the last 72 hours.    Medication list  reviewed  Current Facility-Administered Medications   Medication Dose Route Frequency    spironolactone (ALDACTONE) tablet 50 mg  50 mg Oral BID    ubrogepant (UBRELVY) tablet 100 mg (Patient Supplied)  100 mg Oral ONCE PRN    potassium chloride 10 mEq in 100 ml IVPB  10 mEq IntraVENous Q2H    potassium bicarb-citric acid (EFFER-K) tablet 40 mEq  40 mEq Oral Q8H    0.9% sodium chloride with KCl 40 mEq/L infusion   IntraVENous CONTINUOUS    cholecalciferol (VITAMIN D3) (1000 Units /25 mcg) tablet 5,000 Units  5,000 Units Oral DAILY    fluticasone propionate (FLONASE) 50 mcg/actuation nasal spray 2 Spray  2 Spray Both Nostrils DAILY    ipratropium (ATROVENT HFA) 17 mcg inhaler  1 Puff Inhalation Q6H PRN    losartan (COZAAR) tablet 100 mg  100 mg Oral DAILY    meclizine (ANTIVERT) tablet 25 mg  25 mg Oral Q6H PRN    metoprolol tartrate (LOPRESSOR) tablet 25 mg  25 mg Oral DAILY    budesonide (PULMICORT) 500 mcg/2 ml nebulizer suspension  500 mcg Nebulization BID    montelukast (SINGULAIR) tablet 10 mg  10 mg Oral DAILY    sodium chloride (NS) flush 5-40 mL  5-40 mL IntraVENous Q8H    sodium chloride (NS) flush 5-40 mL  5-40 mL IntraVENous PRN    acetaminophen (TYLENOL) tablet 650 mg  650 mg Oral Q6H PRN    Or    acetaminophen (TYLENOL) suppository 650 mg  650 mg Rectal Q6H PRN    polyethylene glycol (MIRALAX) packet 17 g  17 g Oral DAILY PRN    ondansetron (ZOFRAN ODT) tablet 4 mg  4 mg Oral Q8H PRN    Or    ondansetron (ZOFRAN) injection 4 mg  4 mg IntraVENous Q6H PRN    enoxaparin (LOVENOX) injection 40 mg  40 mg SubCUTAneous DAILY    aluminum-magnesium hydroxide (MAALOX) oral suspension 15 mL  15 mL Oral QID PRN          MD [facility]  [facility] / [facility] [address]  [place], [address]  Phone - [phone]               Fax - [phone]

## 2022-06-19 NOTE — PROGRESS NOTES
Patient refusing to be stuck at this time. Patient is refusing potassium via current functioning IV. Patient is experiencing nausea. Patient has been given Zofran and has been educated on hypokalemia symptoms.

## 2022-06-19 NOTE — ROUTINE PROCESS
End of Shift Note    Bedside shift change report given to Bishop Simmons RN  (oncoming nurse) by Leilani Long RN (offgoing nurse). Report included the following information SBAR and ED Summary    Shift worked:  7p - 7a    Shift summary and any significant changes:    Potassium 2.2   Concerns for physician to address:  NONE   Zone phone for oncoming shift:   [phone]     Patient Information  Kori Dawn  64 y.o.  6/18/2022  9:30 AM by Lynda Lawson MD. Kori Dawn was admitted from Home    Problem List  Patient Active Problem List    Diagnosis Date Noted    Cervicalgia 01/26/2022    Anxiety 01/26/2022    Radiculopathy of cervical spine 01/26/2022    Stress 08/25/2021    Trapezius muscle spasm 08/25/2021    Migraine with vertigo 08/25/2021    Hypokalemia 07/06/2021    Vertigo 09/02/2014    Health care maintenance 09/02/2014    [address] ([facility] [address] 11/12/2013    HTN (hypertension) 06/28/2011    Hepatitis A 09/17/2010    Chronic tension headaches 09/17/2010    Insomnia 09/17/2010    DDD (degenerative disc disease) 09/17/2010    [place] syndrome 09/01/2010     Past Medical History:   Diagnosis Date    Asthma     Bilateral breast cysts 02/2021    Bronchiectasis     Bronchiectasis ([address] 12/2019    Bruxism     Chronic bronchitis     Hypertension     Presbyopia of both eyes 01/2019    Uterine myoma 12/2020    Vertigo 3/2014       Core Measures:  CVA: No No  CHF:No No  PNA:No No    Activity:  Activity Level:  Up with Assistance  Number times ambulated in hallways past shift: 0  Number of times OOB to chair past shift: 0    Cardiac:   Cardiac Monitoring: Yes           Access:   Current line(s): PIV     Genitourinary:   Urinary status: voiding    Respiratory:   O2 Device: None (Room air)  Chronic home O2 use?: N/A  Incentive spirometer at bedside: N/A       GI:  Last Bowel Movement Date: 06/18/22  Current diet:  ADULT DIET Regular; Low Fat/Low Chol/High Fiber/PHILIP  Passing flatus: YES  Tolerating current diet: YES       Pain Management:   Patient states pain is manageable on current regimen: YES    Skin:  Damir Score: 19  Interventions: increase time out of bed    Patient Safety:  Fall Score:  Total Score: 1  Interventions: bed/chair alarm     @Rollbelt  @dexterity to release roll belt  Yes/No ( must document dexterity  here by stating Yes or No here, otherwise this is a restraint and must follow restraint documentation policy.)    DVT prophylaxis:  DVT prophylaxis Med- Yes  DVT prophylaxis SCD or JOB- No     Wounds: (If Applicable)  Wounds- No    Active Consults:  IP CONSULT TO HOSPITALIST  IP CONSULT TO NEPHROLOGY    Length of Stay:  Expected LOS: - - -  Actual LOS: 1  Discharge Plan: Gayla Beltran RN

## 2022-06-20 LAB
ALBUMIN SERPL-MCNC: 2.9 G/DL (ref 3.5–5)
ALBUMIN/GLOB SERPL: 0.9 {RATIO} (ref 1.1–2.2)
ALP SERPL-CCNC: 98 U/L (ref 45–117)
ALT SERPL-CCNC: 71 U/L (ref 12–78)
ANION GAP SERPL CALC-SCNC: 3 MMOL/L (ref 5–15)
APPEARANCE UR: CLEAR
AST SERPL-CCNC: 56 U/L (ref 15–37)
BACTERIA URNS QL MICRO: NEGATIVE /HPF
BILIRUB SERPL-MCNC: 3.3 MG/DL (ref 0.2–1)
BILIRUB UR QL: NEGATIVE
BUN SERPL-MCNC: 6 MG/DL (ref 6–20)
BUN/CREAT SERPL: 13 (ref 12–20)
CALCIUM SERPL-MCNC: 8.7 MG/DL (ref 8.5–10.1)
CHLORIDE SERPL-SCNC: 104 MMOL/L (ref 97–108)
CO2 SERPL-SCNC: 33 MMOL/L (ref 21–32)
COLOR UR: ABNORMAL
CREAT SERPL-MCNC: 0.48 MG/DL (ref 0.55–1.02)
EPITH CASTS URNS QL MICRO: ABNORMAL /LPF
GGT SERPL-CCNC: 72 U/L (ref 5–55)
GLOBULIN SER CALC-MCNC: 3.2 G/DL (ref 2–4)
GLUCOSE SERPL-MCNC: 116 MG/DL (ref 65–100)
GLUCOSE UR STRIP.AUTO-MCNC: NEGATIVE MG/DL
HGB UR QL STRIP: ABNORMAL
HYALINE CASTS URNS QL MICRO: ABNORMAL /LPF (ref 0–2)
KETONES UR QL STRIP.AUTO: 15 MG/DL
LEUKOCYTE ESTERASE UR QL STRIP.AUTO: ABNORMAL
MAGNESIUM SERPL-MCNC: 2 MG/DL (ref 1.6–2.4)
NITRITE UR QL STRIP.AUTO: NEGATIVE
PH UR STRIP: 7.5 [PH] (ref 5–8)
PHOSPHATE SERPL-MCNC: 2 MG/DL (ref 2.6–4.7)
POTASSIUM SERPL-SCNC: 3.8 MMOL/L (ref 3.5–5.1)
PROT SERPL-MCNC: 6.1 G/DL (ref 6.4–8.2)
PROT UR STRIP-MCNC: ABNORMAL MG/DL
RBC #/AREA URNS HPF: ABNORMAL /HPF (ref 0–5)
SODIUM SERPL-SCNC: 140 MMOL/L (ref 136–145)
SP GR UR REFRACTOMETRY: 1.01
UA: UC IF INDICATED,UAUC: ABNORMAL
UROBILINOGEN UR QL STRIP.AUTO: 1 EU/DL (ref 0.2–1)
WBC URNS QL MICRO: ABNORMAL /HPF (ref 0–4)

## 2022-06-20 PROCEDURE — 94640 AIRWAY INHALATION TREATMENT: CPT

## 2022-06-20 PROCEDURE — 74011000250 HC RX REV CODE- 250: Performed by: INTERNAL MEDICINE

## 2022-06-20 PROCEDURE — 74011000250 HC RX REV CODE- 250: Performed by: GENERAL ACUTE CARE HOSPITAL

## 2022-06-20 PROCEDURE — 81001 URINALYSIS AUTO W/SCOPE: CPT

## 2022-06-20 PROCEDURE — 80053 COMPREHEN METABOLIC PANEL: CPT

## 2022-06-20 PROCEDURE — 74011250637 HC RX REV CODE- 250/637: Performed by: INTERNAL MEDICINE

## 2022-06-20 PROCEDURE — 82977 ASSAY OF GGT: CPT

## 2022-06-20 PROCEDURE — 99222 1ST HOSP IP/OBS MODERATE 55: CPT | Performed by: INTERNAL MEDICINE

## 2022-06-20 PROCEDURE — 74011250636 HC RX REV CODE- 250/636: Performed by: INTERNAL MEDICINE

## 2022-06-20 PROCEDURE — 74011250637 HC RX REV CODE- 250/637: Performed by: GENERAL ACUTE CARE HOSPITAL

## 2022-06-20 PROCEDURE — 74011250637 HC RX REV CODE- 250/637: Performed by: HOSPITALIST

## 2022-06-20 PROCEDURE — 36415 COLL VENOUS BLD VENIPUNCTURE: CPT

## 2022-06-20 PROCEDURE — 84100 ASSAY OF PHOSPHORUS: CPT

## 2022-06-20 PROCEDURE — 94761 N-INVAS EAR/PLS OXIMETRY MLT: CPT

## 2022-06-20 PROCEDURE — 74011250636 HC RX REV CODE- 250/636: Performed by: HOSPITALIST

## 2022-06-20 PROCEDURE — 65270000046 HC RM TELEMETRY

## 2022-06-20 PROCEDURE — 74011000250 HC RX REV CODE- 250: Performed by: HOSPITALIST

## 2022-06-20 PROCEDURE — 87040 BLOOD CULTURE FOR BACTERIA: CPT

## 2022-06-20 PROCEDURE — 83735 ASSAY OF MAGNESIUM: CPT

## 2022-06-20 PROCEDURE — 74011000258 HC RX REV CODE- 258: Performed by: INTERNAL MEDICINE

## 2022-06-20 RX ORDER — SODIUM,POTASSIUM PHOSPHATES 280-250MG
2 POWDER IN PACKET (EA) ORAL 4 TIMES DAILY
Status: COMPLETED | OUTPATIENT
Start: 2022-06-20 | End: 2022-06-20

## 2022-06-20 RX ORDER — BUDESONIDE 0.5 MG/2ML
500 INHALANT ORAL
Status: DISCONTINUED | OUTPATIENT
Start: 2022-06-20 | End: 2022-06-21 | Stop reason: HOSPADM

## 2022-06-20 RX ORDER — DOXYCYCLINE HYCLATE 100 MG
100 TABLET ORAL EVERY 12 HOURS
Status: DISCONTINUED | OUTPATIENT
Start: 2022-06-20 | End: 2022-06-21 | Stop reason: HOSPADM

## 2022-06-20 RX ORDER — IPRATROPIUM BROMIDE AND ALBUTEROL SULFATE 2.5; .5 MG/3ML; MG/3ML
3 SOLUTION RESPIRATORY (INHALATION) 3 TIMES DAILY
Status: DISCONTINUED | OUTPATIENT
Start: 2022-06-20 | End: 2022-06-21 | Stop reason: HOSPADM

## 2022-06-20 RX ORDER — VANCOMYCIN/0.9 % SOD CHLORIDE 1.5G/250ML
1500 PLASTIC BAG, INJECTION (ML) INTRAVENOUS ONCE
Status: COMPLETED | OUTPATIENT
Start: 2022-06-20 | End: 2022-06-20

## 2022-06-20 RX ADMIN — ENOXAPARIN SODIUM 40 MG: 100 INJECTION SUBCUTANEOUS at 10:58

## 2022-06-20 RX ADMIN — CHOLECALCIFEROL TAB 25 MCG (1000 UNIT) 5000 UNITS: 25 TAB at 10:58

## 2022-06-20 RX ADMIN — DOXYCYCLINE HYCLATE 100 MG: 100 TABLET, COATED ORAL at 23:29

## 2022-06-20 RX ADMIN — BUDESONIDE 500 MCG: 0.5 INHALANT RESPIRATORY (INHALATION) at 09:18

## 2022-06-20 RX ADMIN — POTASSIUM BICARBONATE 40 MEQ: 782 TABLET, EFFERVESCENT ORAL at 23:29

## 2022-06-20 RX ADMIN — POTASSIUM & SODIUM PHOSPHATES POWDER PACK 280-160-250 MG 2 PACKET: 280-160-250 PACK at 01:18

## 2022-06-20 RX ADMIN — POTASSIUM & SODIUM PHOSPHATES POWDER PACK 280-160-250 MG 2 PACKET: 280-160-250 PACK at 17:48

## 2022-06-20 RX ADMIN — SPIRONOLACTONE 50 MG: 25 TABLET ORAL at 17:48

## 2022-06-20 RX ADMIN — CEFTRIAXONE 1 G: 1 INJECTION, POWDER, FOR SOLUTION INTRAMUSCULAR; INTRAVENOUS at 03:28

## 2022-06-20 RX ADMIN — METOPROLOL TARTRATE 25 MG: 25 TABLET, FILM COATED ORAL at 10:56

## 2022-06-20 RX ADMIN — SODIUM CHLORIDE, PRESERVATIVE FREE 10 ML: 5 INJECTION INTRAVENOUS at 23:30

## 2022-06-20 RX ADMIN — IPRATROPIUM BROMIDE AND ALBUTEROL SULFATE 3 ML: .5; 3 SOLUTION RESPIRATORY (INHALATION) at 09:18

## 2022-06-20 RX ADMIN — CEFTRIAXONE 1 G: 1 INJECTION, POWDER, FOR SOLUTION INTRAMUSCULAR; INTRAVENOUS at 23:33

## 2022-06-20 RX ADMIN — SPIRONOLACTONE 50 MG: 25 TABLET ORAL at 10:57

## 2022-06-20 RX ADMIN — MONTELUKAST 10 MG: 10 TABLET, FILM COATED ORAL at 10:58

## 2022-06-20 RX ADMIN — POTASSIUM BICARBONATE 40 MEQ: 782 TABLET, EFFERVESCENT ORAL at 01:17

## 2022-06-20 RX ADMIN — SODIUM CHLORIDE, PRESERVATIVE FREE 10 ML: 5 INJECTION INTRAVENOUS at 00:53

## 2022-06-20 RX ADMIN — IPRATROPIUM BROMIDE AND ALBUTEROL SULFATE 3 ML: .5; 3 SOLUTION RESPIRATORY (INHALATION) at 15:35

## 2022-06-20 RX ADMIN — BUDESONIDE 500 MCG: 0.5 INHALANT RESPIRATORY (INHALATION) at 19:27

## 2022-06-20 RX ADMIN — METRONIDAZOLE 500 MG: 500 INJECTION, SOLUTION INTRAVENOUS at 00:45

## 2022-06-20 RX ADMIN — ACETAMINOPHEN 650 MG: 325 TABLET ORAL at 11:03

## 2022-06-20 RX ADMIN — DOXYCYCLINE HYCLATE 100 MG: 100 TABLET, COATED ORAL at 10:58

## 2022-06-20 RX ADMIN — POTASSIUM BICARBONATE 40 MEQ: 782 TABLET, EFFERVESCENT ORAL at 13:08

## 2022-06-20 RX ADMIN — POTASSIUM BICARBONATE 40 MEQ: 782 TABLET, EFFERVESCENT ORAL at 06:29

## 2022-06-20 RX ADMIN — IPRATROPIUM BROMIDE AND ALBUTEROL SULFATE 3 ML: .5; 3 SOLUTION RESPIRATORY (INHALATION) at 19:27

## 2022-06-20 RX ADMIN — VANCOMYCIN HYDROCHLORIDE 750 MG: 750 INJECTION, POWDER, LYOPHILIZED, FOR SOLUTION INTRAVENOUS at 16:03

## 2022-06-20 RX ADMIN — POTASSIUM PHOSPHATE, MONOBASIC AND POTASSIUM PHOSPHATE, DIBASIC: 224; 236 INJECTION, SOLUTION, CONCENTRATE INTRAVENOUS at 16:02

## 2022-06-20 RX ADMIN — FLUTICASONE PROPIONATE 2 SPRAY: 50 SPRAY, METERED NASAL at 16:09

## 2022-06-20 RX ADMIN — LOSARTAN POTASSIUM 100 MG: 100 TABLET, FILM COATED ORAL at 10:58

## 2022-06-20 RX ADMIN — POTASSIUM & SODIUM PHOSPHATES POWDER PACK 280-160-250 MG 2 PACKET: 280-160-250 PACK at 13:08

## 2022-06-20 RX ADMIN — VANCOMYCIN HYDROCHLORIDE 1500 MG: 10 INJECTION, POWDER, LYOPHILIZED, FOR SOLUTION INTRAVENOUS at 16:03

## 2022-06-20 NOTE — CONSULTS
Consult    Patient: Idalia Mast MRN: [id number]  SSN: [id number]    YOB: 1965  Age: 64 y.o. Sex: female      Subjective: Idalia Mast is a 64 y.o. female who is being seen for hyperaldosteronism and right adrenal nodule. Pt reports that she was previously seen by Dr. Kirsten Topete of nephrology in 2017 for hypokalemia and was diagnosed with hyperaldosteronism. (Looking through her records she had an aldosterone of 283 in June 2017 in April 2016 her Aldosterone was normal at 26.1. Per pt Dr. Kirsten Topete sent her for bilateral adrenal vein sampling at THE [facility] \"but he said the results were not good and would need to be repeated\". She does not recall Dr. Kirsten Topete every testing any other adrenal hormones. Pt notes Dr. Kirsten Topete started her on KCl. She moved to [facility] to be with family and Dr. Kirsten Topete told her to follow up with a physician there. She notes that she never did, \"life distractions got in the way\". Pt moved back to [facility] a year ago\". She notes that she spoke with her PCP about the potassium and was told her potassium was fine and she was taken off the potassium \"about 8 months ago. \"    Reviewing her chart pt has been voicing symptoms of fatigue, vertigo, dizziness and HAs. She is following with Dr. Cullen Angulo of Neurology for the [facility]. Pt notes she recently had labs drawn by her PCP and her potassium was 2.2 and she was instructed to come to the ED. She was admitted and started on IVF and IV potassium. In addition pt was started on PO spironolactone. She received her first dose of the Spironolactone on 6/18/22 at 1700. ON 6/19/22 she was taken for an MRI and it showed a 15mm, lipid rich adrenal nodule. She has a aldosterone level drawn at 1020Am on 6/18, which would be BEFORE she was started on the spironolactone. This is still pending. She had a Cortisol level, which was in a normal range. Her potassium this afternoon has improved to 3.8.     Past Medical History:   Diagnosis Date    Asthma     Bilateral breast cysts 2021    Bronchiectasis     Bronchiectasis ([address] 2019    Bruxism     Chronic bronchitis     Hypertension     Presbyopia of both eyes 2019    Uterine myoma 2020    Vertigo 3/2014     Past Surgical History:   Procedure Laterality Date    HX BACK SURGERY      HX  SECTION  1990    x1 w/ twins    HX LOBECTOMY      RML      Family History   Problem Relation Age of Onset   Cynda [place] Hypertension Mother     Glaucoma Sister     Hypertension Sister      Social History     Tobacco Use    Smoking status: Never Smoker    Smokeless tobacco: Never Used   Substance Use Topics    Alcohol use: No      Current Facility-Administered Medications   Medication Dose Route Frequency Provider Last Rate Last Admin    vancomycin (VANCOCIN) 1500 mg in  ml infusion  1,500 mg IntraVENous ONCE Mariano Coburn  mL/hr at 22 1603 1,500 mg at 22 1603    doxycycline (VIBRA-TABS) tablet 100 mg  100 mg Oral Q12H Mariano Coburn MD   100 mg at 22 1058    potassium phosphate 20 mmol in 0.9% sodium chloride 500 mL infusion   IntraVENous ONCE Dhruv Nelson MD 84.4 mL/hr at 22 1602 New Bag at 22 1602    potassium, sodium phosphates (NEUTRA-PHOS) packet 2 Packet  2 Packet Oral QID Dhruv Nelson MD   2 Packet at 22 1308    albuterol-ipratropium (DUO-NEB) 2.5 MG-0.5 MG/3 ML  3 mL Nebulization TID Mariano Coburn MD   3 mL at 22 1535    budesonide (PULMICORT) 500 mcg/2 ml nebulizer suspension  500 mcg Nebulization BID RT Mariano Coburn MD        spironolactone (ALDACTONE) tablet 50 mg  50 mg Oral BID Lashay Peres MD   50 mg at 22 1057    potassium bicarb-citric acid (EFFER-K) tablet 40 mEq  40 mEq Oral Q8H Lashay Peres MD   40 mEq at 22 1308    metroNIDAZOLE (FLAGYL) IVPB premix 500 mg  500 mg IntraVENous Q12H Heaven Goldsmith  mL/hr at 06/20/22 0045 500 mg at 06/20/22 0045    cefTRIAXone (ROCEPHIN) 1 g in 0.9% sodium chloride (MBP/ADV) 50 mL MBP  1 g IntraVENous Q24H Abdirizak Nava  mL/hr at 06/20/22 0328 1 g at 06/20/22 0328    vancomycin (VANCOCIN) 750 mg in 0.9% sodium chloride 250 mL (VIAL-MATE)  750 mg IntraVENous Q8H Abdirizak Nava  mL/hr at 06/20/22 1603 750 mg at 06/20/22 1603    cholecalciferol (VITAMIN D3) (1000 Units /25 mcg) tablet 5,000 Units  5,000 Units Oral DAILY Ernesto Perez MD   5,000 Units at 06/20/22 1058    fluticasone propionate (FLONASE) 50 mcg/actuation nasal spray 2 Spray  2 Spray Both Nostrils DAILY Ernesto Perez MD   2 [place] at 06/19/22 0905    ipratropium (ATROVENT HFA) 17 mcg inhaler  1 Puff Inhalation Q6H PRN Ernesto Perez MD        losartan (COZAAR) tablet 100 mg  100 mg Oral DAILY Ernesto Perez MD   100 mg at 06/20/22 1058    meclizine (ANTIVERT) tablet 25 mg  25 mg Oral Q6H PRN Ernesto Perez MD        metoprolol tartrate (LOPRESSOR) tablet 25 mg  25 mg Oral DAILY Ernesto Perez MD   25 mg at 06/20/22 1056    montelukast (SINGULAIR) tablet 10 mg  10 mg Oral DAILY Ernesto Perez MD   10 mg at 06/20/22 1058    sodium chloride (NS) flush 5-40 mL  5-40 mL IntraVENous Q8H Ernesto Perez MD   10 mL at 06/20/22 0053    sodium chloride (NS) flush 5-40 mL  5-40 mL IntraVENous PRN Ernesto Perez MD   10 mL at 06/18/22 1351    acetaminophen (TYLENOL) tablet 650 mg  650 mg Oral Q6H PRN Ernesto Perez MD   650 mg at 06/20/22 1103    Or    acetaminophen (TYLENOL) suppository 650 mg  650 mg Rectal Q6H PRN Ernesto Perez MD        polyethylene glycol (MIRALAX) packet 17 g  17 g Oral DAILY PRN Ernesto Perez MD        ondansetron (ZOFRAN ODT) tablet 4 mg  4 mg Oral Q8H PRN Ernesto Perez MD        Or    ondansetron (ZOFRAN) injection 4 mg  4 mg IntraVENous Q6H PRN Ernesto Perez MD   4 mg at 06/19/22 1440    enoxaparin (LOVENOX) injection 40 mg  40 mg SubCUTAneous DAILY Ernesto Perez MD   40 mg at 06/20/22 1058  aluminum-magnesium hydroxide (MAALOX) oral suspension 15 mL  15 mL Oral QID PRN Ernesto Perez MD            Allergies   Allergen Reactions    Codeine Rash and Itching       Review of Systems:  A comprehensive review of systems was negative except for that written in the History of Present Illness. Objective:     Vitals:    06/20/22 0811 06/20/22 0919 06/20/22 1102 06/20/22 1253   BP: 127/78   (!) 145/70   Pulse: 90   79   Resp: 17   17   Temp: 99.1 °F (37.3 °C)  (!) 100.6 °F (38.1 °C) 100.4 °F (38 °C)   SpO2: 95% 95%  97%   Weight:       Height:            Physical Exam:  GENERAL: alert, cooperative, no distress, appears stated age  EYE: negative  LYMPHATIC: Cervical, supraclavicular, and axillary nodes normal.   THROAT & NECK: normal and no erythema or exudates noted. LUNG: clear to auscultation bilaterally  HEART: regular rate and rhythm, S1, S2 normal, no murmur, click, rub or gallop  ABDOMEN: soft, non-tender. Bowel sounds normal. No masses,  no organomegaly  EXTREMITIES:  extremities normal, atraumatic, no cyanosis or edema  SKIN: Normal.  NEUROLOGIC: negative  PSYCHIATRIC: good mood, normal affect    Recent Results (from the past 24 hour(s))   PROCALCITONIN    Collection Time: 06/19/22 10:20 PM   Result Value Ref Range    Procalcitonin 0.26 ng/mL   LACTIC ACID    Collection Time: 06/19/22 10:20 PM   Result Value Ref Range    Lactic acid 0.7 0.4 - 2.0 MMOL/L   SAMPLES BEING HELD    Collection Time: 06/19/22 10:20 PM   Result Value Ref Range    SAMPLES BEING HELD RED     COMMENT        Add-on orders for these samples will be processed based on acceptable specimen integrity and analyte stability, which may vary by analyte.    URINALYSIS W/ REFLEX CULTURE    Collection Time: 06/20/22  1:00 AM    Specimen: Urine   Result Value Ref Range    Color YELLOW/STRAW      Appearance CLEAR CLEAR      Specific gravity 1.012      pH (UA) 7.5 5.0 - 8.0      Protein TRACE (A) NEG mg/dL    Glucose Negative NEG mg/dL Ketone 15 (A) NEG mg/dL    Bilirubin Negative NEG      Blood TRACE (A) NEG      Urobilinogen 1.0 0.2 - 1.0 EU/dL    Nitrites Negative NEG      Leukocyte Esterase TRACE (A) NEG      UA:UC IF INDICATED CULTURE NOT INDICATED BY UA RESULT CNI      WBC 5-10 0 - 4 /hpf    RBC 5-10 0 - 5 /hpf    Epithelial cells MANY (A) FEW /lpf    Bacteria Negative NEG /hpf    Hyaline cast 0-2 0 - 2 /lpf   MAGNESIUM    Collection Time: 06/20/22  3:15 PM   Result Value Ref Range    Magnesium 2.0 1.6 - 2.4 mg/dL   METABOLIC PANEL, COMPREHENSIVE    Collection Time: 06/20/22  3:15 PM   Result Value Ref Range    Sodium 140 136 - 145 mmol/L    Potassium 3.8 3.5 - 5.1 mmol/L    Chloride 104 97 - 108 mmol/L    CO2 33 (H) 21 - 32 mmol/L    Anion gap 3 (L) 5 - 15 mmol/L    Glucose 116 (H) 65 - 100 mg/dL    BUN 6 6 - 20 MG/DL    Creatinine 0.48 (L) 0.55 - 1.02 MG/DL    BUN/Creatinine ratio 13 12 - 20      GFR est AA >60 >60 ml/min/1.73m2    GFR est non-AA >60 >60 ml/min/1.73m2    Calcium 8.7 8.5 - 10.1 MG/DL    Bilirubin, total 3.3 (H) 0.2 - 1.0 MG/DL    ALT (SGPT) 71 12 - 78 U/L    AST (SGOT) 56 (H) 15 - 37 U/L    Alk. phosphatase 98 45 - 117 U/L    Protein, total 6.1 (L) 6.4 - 8.2 g/dL    Albumin 2.9 (L) 3.5 - 5.0 g/dL    Globulin 3.2 2.0 - 4.0 g/dL    A-G Ratio 0.9 (L) 1.1 - 2.2     PHOSPHORUS    Collection Time: 06/20/22  3:15 PM   Result Value Ref Range    Phosphorus 2.0 (L) 2.6 - 4.7 MG/DL         Narrative & Impression   *PRELIMINARY REPORT*     There is a 15 mm right adrenal nodule (series 3 image 17).  No acute abnormality.     Preliminary report was provided by Dr. Yusef Olivera, the on-call radiologist, at 7:58  PM on 6/19/2022.     Final report to follow.     *END PRELIMINARY REPORT*     Procedure: MRI of the abdomen with MRCP     DATE: 6/19/2022 7:26 PM     TECHNIQUE: Multiplanar MRI of the abdomen was performed with and without  contrast including T2-weighted fat-sat and nonfat sat images, axial in phase and  out of phase imaging, and multiphase postcontrast imaging as well as 3-D MRCP.     Contrast: 15 mL MultiHance     COMPARISON: CT performed 7/28/2021     INDICATION: Hyponatremia,hyperaldosteronism,rule out adrenal nodule     FINDINGS:     Liver: No discrete liver masses are identified. Heterogeneous enhancement  pattern of the liver     Gallbladder: Unremarkable     Biliary tree: No biliary dilatation.     Spleen: Unremarkable     Pancreas: Unremarkable     Adrenals: 14 mm right adrenal nodule which demonstrates loss of signal on out of  phase imaging compatible with a lipid rich adenoma.     Kidneys: Unremarkable with exception of small T2 hyperintense nonenhancing foci  compatible with simple cysts. Vascular:     Soft tissues and osseous structures: Multifocal airspace opacity in the lung  bases incompletely characterized.     IMPRESSION     1.  Right adrenal adenoma. This is unchanged from a prior chest CT dated  7/20/2021.     2.  Scattered airspace opacities in the lung bases incompletely evaluated.          Assessment:     Hospital Problems  Date Reviewed: 5/26/2022          Codes Class Noted POA    Hypokalemia ICD-10-CM: E87.6  ICD-9-CM: 276.8  7/6/2021 Unknown              Plan:     1) Hyperaldosteronism > For now I agree with starting the spironolactone to help with addressing the hypokalemia and hyperaldosteronism. We discussed that in order to properly treat the Hyperaldosteronism we would need to repeat the bilateral adrenal vein sampling and this would need to be done with her OFF the spironolactone. Pt is interested in pursuing adrenalectomy as treatment, but before we go forward with that we need to confirm the source of the aldosterone. Will have pt follow up with me as an OP and we can wean her off the spironolactone, increase her potassium and perform the AVS to confirm the source of the erik.      2) Adrenal nodule > She will need further testing to rule out hypersecretion of cortisol, DHEA-s and metanephrine levels before she had AVS or any surgery. These will need to be done as an OP. I will have have pt follow up with me after she is discharged home and we can test these levels prior to moving forward with #1. I spent 55 minutes of face to face time on her case and > 50% of the time was spent reviewing the chart and coordinating her care with the nurse caring for her.         Signed By: Min Morris MD     June 20, 2022

## 2022-06-20 NOTE — PROGRESS NOTES
Hospitalist    RAT call for increased MEWs    Temp to 102, first fevers since admit, HR to 107    ?  RLL ASD on CXR    Respiratory PCR panel was negative    No UA or blood cultures or lactate    Check stat blood cultures and lactate    Check UA and reflex culture    Check urine legionella antigen    T bili elevated, but prelim MRCP with no acute findings   Not clear why the MRCP ordered  Check GGT and check final MRCP read    Stop Po cipro vee with the low K    IV ceftriaxone, flagyl and kourtney Viramontes MD

## 2022-06-20 NOTE — PROGRESS NOTES
Hospital follow-up PCP transitional care appointment has been scheduled with Dr. Jenniffer Sharma on 6/24/22 at 1020. Pending patient discharge.   Janey Montemayor, Care Management Assistant

## 2022-06-20 NOTE — PROGRESS NOTES
Hospitalist Progress Note    Subjective:   Daily Progress Note: 6/20/2022 3:19 PM    Hospital Course:  Ms. David Bañuelos is a 64 y.o. female known history of hypokalemia  lost to follow-up with [facility]. Patient had outpatient labs with low potassium, PCP asked her to go to the emergency room. Patient presented to [facility] ED. Potassium was 2.0. Patient was given IV and p.o. potassium. She is being admitted to hospital for further care. She has chronic vertigo/dizziness, she has been having panic attacks chronically, she has some stomach discomfort, she is intermittently short of breath from bronchiectasis. She does not ambulate or walk as much. Patient was febrile, tachycardic on admission. Also had white count. CXR suggestive of pneumonia. Subjective:  Patient reports that she has bronchitis. Denies urinary symptoms.      Current Facility-Administered Medications   Medication Dose Route Frequency    vancomycin (VANCOCIN) 1500 mg in  ml infusion  1,500 mg IntraVENous ONCE    doxycycline (VIBRA-TABS) tablet 100 mg  100 mg Oral Q12H    potassium phosphate 20 mmol in 0.9% sodium chloride 500 mL infusion   IntraVENous ONCE    potassium, sodium phosphates (NEUTRA-PHOS) packet 2 Packet  2 Packet Oral QID    albuterol-ipratropium (DUO-NEB) 2.5 MG-0.5 MG/3 ML  3 mL Nebulization TID    budesonide (PULMICORT) 500 mcg/2 ml nebulizer suspension  500 mcg Nebulization BID RT    spironolactone (ALDACTONE) tablet 50 mg  50 mg Oral BID    potassium bicarb-citric acid (EFFER-K) tablet 40 mEq  40 mEq Oral Q8H    metroNIDAZOLE (FLAGYL) IVPB premix 500 mg  500 mg IntraVENous Q12H    cefTRIAXone (ROCEPHIN) 1 g in 0.9% sodium chloride (MBP/ADV) 50 mL MBP  1 g IntraVENous Q24H    vancomycin (VANCOCIN) 750 mg in 0.9% sodium chloride 250 mL (VIAL-MATE)  750 mg IntraVENous Q8H    cholecalciferol (VITAMIN D3) (1000 Units /25 mcg) tablet 5,000 Units  5,000 Units Oral DAILY    fluticasone propionate (FLONASE) 50 mcg/actuation nasal spray 2 Spray  2 Spray Both Nostrils DAILY    ipratropium (ATROVENT HFA) 17 mcg inhaler  1 Puff Inhalation Q6H PRN    losartan (COZAAR) tablet 100 mg  100 mg Oral DAILY    meclizine (ANTIVERT) tablet 25 mg  25 mg Oral Q6H PRN    metoprolol tartrate (LOPRESSOR) tablet 25 mg  25 mg Oral DAILY    montelukast (SINGULAIR) tablet 10 mg  10 mg Oral DAILY    sodium chloride (NS) flush 5-40 mL  5-40 mL IntraVENous Q8H    sodium chloride (NS) flush 5-40 mL  5-40 mL IntraVENous PRN    acetaminophen (TYLENOL) tablet 650 mg  650 mg Oral Q6H PRN    Or    acetaminophen (TYLENOL) suppository 650 mg  650 mg Rectal Q6H PRN    polyethylene glycol (MIRALAX) packet 17 g  17 g Oral DAILY PRN    ondansetron (ZOFRAN ODT) tablet 4 mg  4 mg Oral Q8H PRN    Or    ondansetron (ZOFRAN) injection 4 mg  4 mg IntraVENous Q6H PRN    enoxaparin (LOVENOX) injection 40 mg  40 mg SubCUTAneous DAILY    aluminum-magnesium hydroxide (MAALOX) oral suspension 15 mL  15 mL Oral QID PRN        Review of Systems  Constitutional: has fevers, has chills, No sweats, No fatigue, No Weakness  Eyes: No redness  Ears, nose, mouth, throat, and face: No nasal congestion, No sore throat, No voice change  Respiratory: has Shortness of Breath, has cough, No wheezing  Cardiovascular: No chest pain, No palpitations, No extremity edema  Gastrointestinal: No nausea, No vomiting, No diarrhea, No abdominal pain  Genitourinary: No frequency, No dysuria, No hematuria  Integument/breast: No skin lesion(s)   Neurological: No Confusion, No headaches, No dizziness      Objective:     Visit Vitals  BP (!) 145/70   Pulse 79   Temp 100.4 °F (38 °C)   Resp 17   Ht 5' 9\" (1.753 m)   Wt 63.5 kg (139 lb 15.9 oz)   SpO2 97%   Breastfeeding No   BMI 20.67 kg/m²      O2 Device: None (Room air)    Temp (24hrs), Av.5 °F (38.1 °C), Min:99.1 °F (37.3 °C), Max:102 °F (38.9 °C)      No intake/output data recorded.   No intake/output data recorded. PHYSICAL EXAM:  Constitutional: No acute distress  Skin: Extremities and face reveal no rashes. HEENT: Sclerae anicteric. Extra-occular muscles are intact. No oral ulcers. The neck is supple and no masses. Cardiovascular: Regular rate and rhythm. Respiratory:  Clear breath sounds bilaterally with no wheezes, rales, or rhonchi. GI: Abdomen nondistended, soft, and nontender. Normal active bowel sounds. Musculoskeletal: No pitting edema of the lower legs. Able to move all ext  Neurological:  Patient is alert and oriented.  Cranial nerves II-XII grossly intact  Psychiatric: Mood appears appropriate       Data Review    Recent Results (from the past 24 hour(s))   RESPIRATORY VIRUS PANEL W/COVID-19, PCR    Collection Time: 06/19/22  3:27 PM    Specimen: Nasopharyngeal   Result Value Ref Range    Adenovirus Not detected NOTD      Coronavirus 229E Not detected NOTD      Coronavirus HKU1 Not detected NOTD      Coronavirus CVNL63 Not detected NOTD      Coronavirus OC43 Not detected NOTD      SARS-CoV-2, PCR Not detected NOTD      Metapneumovirus Not detected NOTD      Rhinovirus and Enterovirus Not detected NOTD      Influenza A Not detected NOTD      Influenza A, subtype H1 Not detected NOTD      Influenza A, subtype H3 Not detected NOTD      INFLUENZA A H1N1 PCR Not detected NOTD      Influenza B Not detected NOTD      Parainfluenza 1 Not detected NOTD      Parainfluenza 2 Not detected NOTD      Parainfluenza 3 Not detected NOTD      Parainfluenza virus 4 Not detected NOTD      RSV by PCR Not detected NOTD      B. parapertussis, PCR Not detected NOTD      Bordetella pertussis - PCR Not detected NOTD      Chlamydophila pneumoniae DNA, QL, PCR Not detected NOTD      Mycoplasma pneumoniae DNA, QL, PCR Not detected NOTD     CULTURE, RESPIRATORY/SPUTUM/BRONCH W GRAM STAIN    Collection Time: 06/19/22  3:30 PM    Specimen: Sputum   Result Value Ref Range    Special Requests: NO SPECIAL REQUESTS      GRAM STAIN 1+ WBCS SEEN      GRAM STAIN MODERATE EPITHELIAL CELLS SEEN      GRAM STAIN MODERATE GRAM NEGATIVE COCCOBACILLI      GRAM STAIN RARE GRAM POSITIVE COCCI IN PAIRS      GRAM STAIN RARE GRAM POSITIVE RODS      Culture result: PENDING    PROCALCITONIN    Collection Time: 06/19/22 10:20 PM   Result Value Ref Range    Procalcitonin 0.26 ng/mL   LACTIC ACID    Collection Time: 06/19/22 10:20 PM   Result Value Ref Range    Lactic acid 0.7 0.4 - 2.0 MMOL/L   SAMPLES BEING HELD    Collection Time: 06/19/22 10:20 PM   Result Value Ref Range    SAMPLES BEING HELD RED     COMMENT        Add-on orders for these samples will be processed based on acceptable specimen integrity and analyte stability, which may vary by analyte. URINALYSIS W/ REFLEX CULTURE    Collection Time: 06/20/22  1:00 AM    Specimen: Urine   Result Value Ref Range    Color YELLOW/STRAW      Appearance CLEAR CLEAR      Specific gravity 1.012      pH (UA) 7.5 5.0 - 8.0      Protein TRACE (A) NEG mg/dL    Glucose Negative NEG mg/dL    Ketone 15 (A) NEG mg/dL    Bilirubin Negative NEG      Blood TRACE (A) NEG      Urobilinogen 1.0 0.2 - 1.0 EU/dL    Nitrites Negative NEG      Leukocyte Esterase TRACE (A) NEG      UA:UC IF INDICATED CULTURE NOT INDICATED BY UA RESULT CNI      WBC 5-10 0 - 4 /hpf    RBC 5-10 0 - 5 /hpf    Epithelial cells MANY (A) FEW /lpf    Bacteria Negative NEG /hpf    Hyaline cast 0-2 0 - 2 /lpf         Assessment / Plan:  Sepsis:  Likely s/s pneumonia:  Continue Ceftriaxone and flagyl  Will add doxycycline for atypical coverage    Acute hypokalemia:  No labs from today  Continue iv and po potassium    Hyperaldosteronism: diagnosed in 2017  MRI abdomen shows adrenal adenoma  Will consult endocrine  Continue aldactone  Patient will need adrenalectomy  Continue losartan    Pneumonia:  Continue antibiotics. UTI:  Patient denies urinary symptoms. Already on antibiotics.     18.5 - 24.9 Normal weight / Body mass index is 20.67 kg/m². Code status: Full  Prophylaxis: Lovenox  Recommended Disposition: Home w/Family        time spent 35 minutes involving direct patient care as well as reviewing patient's labs and coordination of care with nursing staff     Care Plan discussed with: Patient/Family/RN/Case Management        Total time spent with patient: 35 minutes.

## 2022-06-20 NOTE — PROGRESS NOTES
ADULT PROTOCOL: JET AEROSOL ASSESSMENT    Patient  Eun Rocha     64 y.o.   female     6/20/2022  11:24 AM    Breath Sounds Pre Procedure: Right Breath Sounds: Diminished                               Left Breath Sounds: Diminished    Breath Sounds Post Procedure: Right Breath Sounds: Diminished                                 Left Breath Sounds: Diminished    Breathing pattern: Pre procedure Breathing Pattern: Regular          Post procedure Breathing Pattern: Regular    Heart Rate: Pre procedure Pulse: 96           Post procedure Pulse: 95    Resp Rate: Pre procedure Respirations: 20           Post procedure Respirations: 20    Cough: Pre procedure Cough: Non-productive               Post procedure Cough: Non-productive    Oxygen: O2 Device: None (Room air)            SpO2: Pre procedure SpO2: 95 %               Post procedure SpO2: 97 %    Nebulizer Therapy: Current medications Aerosolized Medications: DuoNeb,Pulmicort            Problem List:   Patient Active Problem List   Diagnosis Code    [place] syndrome 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 109    Cervicalgia M54.2    Anxiety F41.9    Radiculopathy of cervical spine M54.12       Respiratory Therapist: Minus Floor, RT

## 2022-06-20 NOTE — PROGRESS NOTES
Transition of Care Plan:    RUR: 9% - Low  Disposition: Home w/ family  Follow up appointments: PCP - patient confirms PCP as Dr. Ainsley Luke - [phone]  DME needed: No needs identified  Transportation at Discharge: [facility] or means to access home:      yes  IM Medicare Letter: N/A - Pt has FUELUP  Is patient a BCPI-A Bundle:     N/A      If yes, was Bundle Letter given?:    Is patient a  and connected with the [place]? N/A          If yes, was [place] transfer form completed and [facility] notified? Caregiver Contact: - Arnie Babin - [phone]  Discharge Caregiver contacted prior to discharge? Patient states she spoke to family - daughter at [facility] needed?:     Not at present time    Reason for Admission:  [address] [facility] asked patient to go to ED                     RUR Score:  9%                   Plan for utilizing home health:      No Hx of home health - independent at baseline    PCP: First and Last name:  Dr. Chandrakant Heath   Name of Practice:    Are you a current patient: Yes/No: Yes   Approximate date of last visit: Last week   Can you participate in a virtual visit with your PCP:                     Current Advanced Directive/Advance Care Plan: Full Code      Healthcare Decision Maker:   Click here to complete [address] including selection of the Healthcare Decision Maker Relationship (ie \"Primary\")           Aris 13 (ACP) Conversation      Date of Conversation: 6/18/2022  Conducted with: Patient with [address]:   No healthcare decision makers have been documented. Click here to complete [address] including selection of the Healthcare Decision Maker Relationship (ie \"Primary\")    Emergency Contact - arnie Babin - [phone]    Patient declines ACP conversation. States she has two adult children in [facility] and one visiting from [facility]. She prefers her son be her emergency contact but does not want to complete ACP. Content/Action Overview:   DECLINED ACP conversation - will revisit periodically   Reviewed DNR/DNI and patient elects Full Code (Attempt Resuscitation)    Length of Voluntary ACP Conversation in minutes:  12 minutes    Saqib Baldwin RN                             Transition of Care Plan:                                CM at bedside to introduce self and explain role of CM. Patient alert and oriented x 4 - playing with grandchildren at bedside. Patient confirms all demographic information, including PCP and insurance as [employer]. Patient states she lives in private home w/ her adult son - he lives upstairs and she lives downstairs. She does not need to ambulate stairs at all. Son provides all transportation for patient. Patient reports being independent with all ADL's and denies any past use of home health, DME or SNF placement. CM will continue to follow patient for discharge needs. Care Management Interventions  PCP Verified by CM:  Yes  Transition of Care Consult (CM Consult): Discharge Planning  Discharge Durable Medical Equipment: No  Physical Therapy Consult: Yes  Occupational Therapy Consult: Yes  Support Systems: Child(sandee)  Confirm Follow Up Transport: Family  Discharge Location  Patient Expects to be Discharged to[de-identified] Home    Beny Acevedo RN, BSN, [address] [place] [address]  Manager of Case Management  [phone]

## 2022-06-20 NOTE — PROGRESS NOTES
Bedside shift change report given to Dimsa Isabel RN (oncoming nurse) by Tierney Ibarra RN (offgoing nurse). Report included the following information SBAR, Kardex, Intake/Output, MAR, Med Rec Status and Cardiac Rhythm sinus to sinus tachy . 0740: : day shift and nightshift RN at bedside for lab draws and PIV assessment. Blood cultures x2 sent. PIV leaking. Unable to obtain other labs and PIV access at this time. Pt. Requests no longer having RNs attempt. 26: nightshift RN called PICC to place PIV.    1508: PICC team at bedside to place PIV and obtain labs at this time. 1538: vancomycin started. 1600: potassium started at this time. End of Shift Note    Bedside shift change report given to Scout Cerda (oncoming nurse) by Lena Dela Cruz RN (offgoing nurse). Report included the following information SBAR, Kardex, Procedure Summary, Intake/Output, MAR, Recent Results, Med Rec Status and Cardiac Rhythm sinus, sinus tachycardia    Shift worked:  dayshift     Shift summary and any significant changes:     new PIV placed. Changes to medications including potassium, antibiotics. Potassium currently 3.8. Concerns for physician to address:  pt. Continues to have low grade fever. Zone phone for oncoming shift:   . ..

## 2022-06-20 NOTE — PROGRESS NOTES
RAPID RESPONSE TEAM    Overhead rapid response paged to room # 28-64-27-85  at 6838    Reason for rapid response: MEWS score change    Initial assessment: upon arrival primary nurse states pt triggered MEWS score with recent vitals and temp 102. Tylenol already given. Patient Vitals for the past 4 hrs:   Temp Pulse Resp BP SpO2   06/19/22 2012 (!) 102 °F (38.9 °C) (!) 105 18 (!) 149/70 97 %   06/19/22 1752 100.2 °F (37.9 °C) -- -- -- --          Interventions: pt in no distress at this time. Nurse will recheck vitals at the 1 hour post administration of tylenol and notify RRT for any concerns. Review of chart/md notes reveals suspicion for aspiration vs. Pneumonia on CXR, and Cipro was initiated today. Will continue to monitor for s/s infection, sepsis and/or decompensation. New orders noted for labs, cultures, and antibiotic changes. Assisted with collection of these specimens. Outcome: pt to remain in 3305/01     Please call with any questions or concerns    Jb White RN  Rapid Response Team  Ext [phone]     Recent Results (from the past 12 hour(s))   PROCALCITONIN    Collection Time: 06/19/22 10:20 PM   Result Value Ref Range    Procalcitonin 0.26 ng/mL   LACTIC ACID    Collection Time: 06/19/22 10:20 PM   Result Value Ref Range    Lactic acid 0.7 0.4 - 2.0 MMOL/L   SAMPLES BEING HELD    Collection Time: 06/19/22 10:20 PM   Result Value Ref Range    SAMPLES BEING HELD RED     COMMENT        Add-on orders for these samples will be processed based on acceptable specimen integrity and analyte stability, which may vary by analyte.    URINALYSIS W/ REFLEX CULTURE    Collection Time: 06/20/22  1:00 AM    Specimen: Urine   Result Value Ref Range    Color YELLOW/STRAW      Appearance CLEAR CLEAR      Specific gravity 1.012      pH (UA) 7.5 5.0 - 8.0      Protein TRACE (A) NEG mg/dL    Glucose Negative NEG mg/dL    Ketone 15 (A) NEG mg/dL    Bilirubin Negative NEG      Blood TRACE (A) NEG      Urobilinogen 1.0 0.2 - 1.0 EU/dL    Nitrites Negative NEG      Leukocyte Esterase TRACE (A) NEG      UA:UC IF INDICATED CULTURE NOT INDICATED BY UA RESULT CNI      WBC 5-10 0 - 4 /hpf    RBC 5-10 0 - 5 /hpf    Epithelial cells MANY (A) FEW /lpf    Bacteria Negative NEG /hpf    Hyaline cast 0-2 0 - 2 /lpf

## 2022-06-20 NOTE — PROGRESS NOTES
Nephrology Progress Note  Perfecto Coleman     [email or web address]. [email or web address]  Phone - [phone]   Patient: Sowmya Youssef    YOB: 1965        Date- 6/20/2022   Admit Date: 6/18/2022  CC: Follow up for hypokalemia          IMPRESSION & PLAN:     hypokalemia-    Hyperaldosteronism- dx in 2017-- erik LEVEL 26 in 2017-- CT abdo showed 1.9 cm R adrenal nodule. -- MRI 15 mm right adrenal nodule  IN June 2022   Right adrenal adenoma   Hypertension   hypophosphatemia    PLAN-   kcl po and iv   Iv kphos   Stop ivf   Continue aldactone   She will need adrenalectomy   Follow bmp   Continue losartan     Subjective: Interval History:   -  k remained low  BP HIGH    She was seen by DR. Arthur Walker in 2017. She was recommenced to get right adrenalectomy done. She went RAZA after that. She has stopped taking kcl supplements and aldactone. Objective:   Vitals:    06/19/22 2012 06/19/22 2145 06/19/22 2331 06/20/22 0223   BP: (!) 149/70  (!) 140/75 132/79   Pulse: (!) 105  97 96   Resp: 18  16 17   Temp: (!) 102 °F (38.9 °C) 100.4 °F (38 °C) 100 °F (37.8 °C) (!) 101 °F (38.3 °C)   SpO2: 97%  92% 93%   Weight:       Height:          No intake/output data recorded. Last 3 Recorded Weights in this Encounter    06/18/22 0929   Weight: 63.5 kg (139 lb 15.9 oz)      Physical exam:    GEN: NAD  NECK- Supple, no mass  RESP: No wheezing, Clear b/l  CVS: S1,S2  RRR  NEURO: Normal speech, Non focal  PSYCH: Normal Mood    Chart reviewed. Pertinent Notes reviewed.      Data Review :  Recent Labs     06/19/22  0313 06/18/22  1814 06/18/22  1640 06/18/22  0947    141  --  140   K 2.2* 2.3*  --  2.0*    99  --  98   CO2 33* 36*  --  37*   BUN 5* 8  --  11   CREA 0.51* 0.72  --  0.71   * 163*  --  147*   CA 8.3* 8.9  --  9.5   MG 2.3  --  1.8  --    PHOS 1.5*  --   --   --      Recent Labs     06/19/22  0313 06/18/22  0947 06/17/22  1120   WBC 12.3* 13.5* 7.1   HGB 12.2 14.4 13.9   HCT 35.4 42.1 43.5    220 221     No results for input(s): FE, TIBC, PSAT, FERR in the last 72 hours.    Medication list  reviewed  Current Facility-Administered Medications   Medication Dose Route Frequency    vancomycin (VANCOCIN) 1500 mg in  ml infusion  1,500 mg IntraVENous ONCE    doxycycline (VIBRA-TABS) tablet 100 mg  100 mg Oral Q12H    spironolactone (ALDACTONE) tablet 50 mg  50 mg Oral BID    ubrogepant (UBRELVY) tablet 100 mg (Patient Supplied)  100 mg Oral ONCE PRN    potassium bicarb-citric acid (EFFER-K) tablet 40 mEq  40 mEq Oral Q8H    albuterol-ipratropium (DUO-NEB) 2.5 MG-0.5 MG/3 ML  3 mL Nebulization TID    metroNIDAZOLE (FLAGYL) IVPB premix 500 mg  500 mg IntraVENous Q12H    cefTRIAXone (ROCEPHIN) 1 g in 0.9% sodium chloride (MBP/ADV) 50 mL MBP  1 g IntraVENous Q24H    vancomycin (VANCOCIN) 750 mg in 0.9% sodium chloride 250 mL (VIAL-MATE)  750 mg IntraVENous Q8H    0.9% sodium chloride with KCl 40 mEq/L infusion   IntraVENous CONTINUOUS    cholecalciferol (VITAMIN D3) (1000 Units /25 mcg) tablet 5,000 Units  5,000 Units Oral DAILY    fluticasone propionate (FLONASE) 50 mcg/actuation nasal spray 2 Spray  2 Spray Both Nostrils DAILY    ipratropium (ATROVENT HFA) 17 mcg inhaler  1 Puff Inhalation Q6H PRN    losartan (COZAAR) tablet 100 mg  100 mg Oral DAILY    meclizine (ANTIVERT) tablet 25 mg  25 mg Oral Q6H PRN    metoprolol tartrate (LOPRESSOR) tablet 25 mg  25 mg Oral DAILY    budesonide (PULMICORT) 500 mcg/2 ml nebulizer suspension  500 mcg Nebulization BID    montelukast (SINGULAIR) tablet 10 mg  10 mg Oral DAILY    sodium chloride (NS) flush 5-40 mL  5-40 mL IntraVENous Q8H    sodium chloride (NS) flush 5-40 mL  5-40 mL IntraVENous PRN    acetaminophen (TYLENOL) tablet 650 mg  650 mg Oral Q6H PRN    Or    acetaminophen (TYLENOL) suppository 650 mg  650 mg Rectal Q6H PRN    polyethylene glycol (MIRALAX) packet 17 g  17 g Oral DAILY PRN    ondansetron (ZOFRAN ODT) tablet 4 mg  4 mg Oral Q8H PRN    Or    ondansetron (ZOFRAN) injection 4 mg  4 mg IntraVENous Q6H PRN    enoxaparin (LOVENOX) injection 40 mg  40 mg SubCUTAneous DAILY    aluminum-magnesium hydroxide (MAALOX) oral suspension 15 mL  15 mL Oral QID PRN          Cara Garza MD              [facility]  Formerly [facility] / [facility] [address], [address] [facility]  Phone - [phone]               Fax - [phone]

## 2022-06-20 NOTE — PROGRESS NOTES
End of Shift Note    Bedside shift change report given to Ivan Ospina, RN (oncoming nurse) by Yin Hurley, RN (offgoing nurse). Report included the following information SBAR and ED Summary    Shift worked:  7p - 7a   Shift summary and any significant changes:     Potassium and IV antibiotics late. Rapid Response last night. Concerns for physician to address:  NONE   Zone phone for oncoming shift:   [phone]     Patient Information  Abel Panchal  64 y.o.  6/18/2022  9:30 AM by Sang Wade MD. Abel Panchal was admitted from Home    Problem List  Patient Active Problem List    Diagnosis Date Noted    Cervicalgia 01/26/2022    Anxiety 01/26/2022    Radiculopathy of cervical spine 01/26/2022    Stress 08/25/2021    Trapezius muscle spasm 08/25/2021    Migraine with vertigo 08/25/2021    Hypokalemia 07/06/2021    Vertigo 09/02/2014    Health care maintenance 09/02/2014    Bronchiectasis ([facility] [address] 11/12/2013    HTN (hypertension) 06/28/2011    Hepatitis A 09/17/2010    Chronic tension headaches 09/17/2010    Insomnia 09/17/2010    DDD (degenerative disc disease) 09/17/2010    [place] syndrome 09/01/2010     Past Medical History:   Diagnosis Date    Asthma     Bilateral breast cysts 02/2021    Bronchiectasis     Bronchiectasis ([address] 12/2019    Bruxism     Chronic bronchitis     Hypertension     Presbyopia of both eyes 01/2019    Uterine myoma 12/2020    Vertigo 3/2014       Core Measures:  CVA: No No  CHF:No No  PNA:No No    Activity:  Activity Level:  Up with Assistance  Number times ambulated in hallways past shift: 0  Number of times OOB to chair past shift: 0    Cardiac:   Cardiac Monitoring: Yes      Cardiac Rhythm: Sinus Tachy    Access:   Current line(s): PIV     Genitourinary:   Urinary status: voiding    Respiratory:   O2 Device: None (Room air)  Chronic home O2 use?: N/A  Incentive spirometer at bedside: N/A       GI:  Last Bowel Movement Date: 06/18/22  Current diet:  ADULT DIET Regular; Low Fat/Low Chol/High Fiber/PHILIP  Passing flatus: YES  Tolerating current diet: YES       Pain Management:   Patient states pain is manageable on current regimen: YES    Skin:  Damir Score: 22  Interventions: increase time out of bed    Patient Safety:  Fall Score:  Total Score: 2  Interventions: bed/chair alarm     @Rollbelt  @dexterity to release roll belt  Yes/No ( must document dexterity  here by stating Yes or No here, otherwise this is a restraint and must follow restraint documentation policy.)    DVT prophylaxis:  DVT prophylaxis Med- Yes  DVT prophylaxis SCD or JOB- No     Wounds: (If Applicable)  Wounds- No    Active Consults:  IP CONSULT TO HOSPITALIST  IP CONSULT TO NEPHROLOGY    Length of Stay:  Expected LOS: - - -  Actual LOS: 2  Discharge Plan: Chay Yu RN

## 2022-06-21 VITALS
RESPIRATION RATE: 18 BRPM | BODY MASS INDEX: 20.73 KG/M2 | SYSTOLIC BLOOD PRESSURE: 143 MMHG | OXYGEN SATURATION: 94 % | DIASTOLIC BLOOD PRESSURE: 75 MMHG | WEIGHT: 139.99 LBS | TEMPERATURE: 98.4 F | HEART RATE: 92 BPM | HEIGHT: 69 IN

## 2022-06-21 LAB
ANION GAP SERPL CALC-SCNC: 5 MMOL/L (ref 5–15)
BASOPHILS # BLD: 0 K/UL (ref 0–0.1)
BASOPHILS NFR BLD: 0 % (ref 0–1)
BUN SERPL-MCNC: 6 MG/DL (ref 6–20)
BUN/CREAT SERPL: 14 (ref 12–20)
CALCIUM SERPL-MCNC: 8.8 MG/DL (ref 8.5–10.1)
CHLORIDE SERPL-SCNC: 106 MMOL/L (ref 97–108)
CO2 SERPL-SCNC: 29 MMOL/L (ref 21–32)
CREAT SERPL-MCNC: 0.43 MG/DL (ref 0.55–1.02)
DIFFERENTIAL METHOD BLD: ABNORMAL
EOSINOPHIL # BLD: 0 K/UL (ref 0–0.4)
EOSINOPHIL NFR BLD: 0 % (ref 0–7)
ERYTHROCYTE [DISTWIDTH] IN BLOOD BY AUTOMATED COUNT: 14.5 % (ref 11.5–14.5)
GLUCOSE SERPL-MCNC: 136 MG/DL (ref 65–100)
HCT VFR BLD AUTO: 37.4 % (ref 35–47)
HGB BLD-MCNC: 12.1 G/DL (ref 11.5–16)
IMM GRANULOCYTES # BLD AUTO: 0.1 K/UL (ref 0–0.04)
IMM GRANULOCYTES NFR BLD AUTO: 1 % (ref 0–0.5)
LYMPHOCYTES # BLD: 1.3 K/UL (ref 0.8–3.5)
LYMPHOCYTES NFR BLD: 16 % (ref 12–49)
MAGNESIUM SERPL-MCNC: 1.7 MG/DL (ref 1.6–2.4)
MCH RBC QN AUTO: 29 PG (ref 26–34)
MCHC RBC AUTO-ENTMCNC: 32.4 G/DL (ref 30–36.5)
MCV RBC AUTO: 89.7 FL (ref 80–99)
MONOCYTES # BLD: 0.3 K/UL (ref 0–1)
MONOCYTES NFR BLD: 4 % (ref 5–13)
NEUTS SEG # BLD: 6.4 K/UL (ref 1.8–8)
NEUTS SEG NFR BLD: 79 % (ref 32–75)
NRBC # BLD: 0 K/UL (ref 0–0.01)
NRBC BLD-RTO: 0 PER 100 WBC
PHOSPHATE SERPL-MCNC: 2.5 MG/DL (ref 2.6–4.7)
PLATELET # BLD AUTO: 145 K/UL (ref 150–400)
PMV BLD AUTO: 10.6 FL (ref 8.9–12.9)
POTASSIUM SERPL-SCNC: 3.5 MMOL/L (ref 3.5–5.1)
RBC # BLD AUTO: 4.17 M/UL (ref 3.8–5.2)
SODIUM SERPL-SCNC: 140 MMOL/L (ref 136–145)
VANCOMYCIN SERPL-MCNC: 5.8 UG/ML
WBC # BLD AUTO: 8.1 K/UL (ref 3.6–11)

## 2022-06-21 PROCEDURE — 74011250636 HC RX REV CODE- 250/636: Performed by: HOSPITALIST

## 2022-06-21 PROCEDURE — 74011250637 HC RX REV CODE- 250/637: Performed by: INTERNAL MEDICINE

## 2022-06-21 PROCEDURE — 74011250636 HC RX REV CODE- 250/636: Performed by: INTERNAL MEDICINE

## 2022-06-21 PROCEDURE — 36415 COLL VENOUS BLD VENIPUNCTURE: CPT

## 2022-06-21 PROCEDURE — 84100 ASSAY OF PHOSPHORUS: CPT

## 2022-06-21 PROCEDURE — 80048 BASIC METABOLIC PNL TOTAL CA: CPT

## 2022-06-21 PROCEDURE — 74011000250 HC RX REV CODE- 250: Performed by: INTERNAL MEDICINE

## 2022-06-21 PROCEDURE — 80202 ASSAY OF VANCOMYCIN: CPT

## 2022-06-21 PROCEDURE — 74011000250 HC RX REV CODE- 250: Performed by: HOSPITALIST

## 2022-06-21 PROCEDURE — 85025 COMPLETE CBC W/AUTO DIFF WBC: CPT

## 2022-06-21 PROCEDURE — 74011250637 HC RX REV CODE- 250/637: Performed by: HOSPITALIST

## 2022-06-21 PROCEDURE — 94761 N-INVAS EAR/PLS OXIMETRY MLT: CPT

## 2022-06-21 PROCEDURE — 83735 ASSAY OF MAGNESIUM: CPT

## 2022-06-21 PROCEDURE — 94640 AIRWAY INHALATION TREATMENT: CPT

## 2022-06-21 RX ORDER — SPIRONOLACTONE 50 MG/1
50 TABLET, FILM COATED ORAL 2 TIMES DAILY
Qty: 60 TABLET | Refills: 0 | Status: SHIPPED | OUTPATIENT
Start: 2022-06-21 | End: 2022-07-21

## 2022-06-21 RX ORDER — MAGNESIUM SULFATE HEPTAHYDRATE 40 MG/ML
2 INJECTION, SOLUTION INTRAVENOUS ONCE
Status: COMPLETED | OUTPATIENT
Start: 2022-06-21 | End: 2022-06-21

## 2022-06-21 RX ORDER — AMOXICILLIN AND CLAVULANATE POTASSIUM 875; 125 MG/1; MG/1
1 TABLET, FILM COATED ORAL 2 TIMES DAILY
Qty: 10 TABLET | Refills: 0 | Status: SHIPPED | OUTPATIENT
Start: 2022-06-21 | End: 2022-07-21

## 2022-06-21 RX ORDER — SODIUM,POTASSIUM PHOSPHATES 280-250MG
1 POWDER IN PACKET (EA) ORAL 4 TIMES DAILY
Status: DISCONTINUED | OUTPATIENT
Start: 2022-06-21 | End: 2022-06-21 | Stop reason: HOSPADM

## 2022-06-21 RX ORDER — DOXYCYCLINE HYCLATE 100 MG
100 TABLET ORAL EVERY 12 HOURS
Qty: 10 TABLET | Refills: 0 | Status: SHIPPED | OUTPATIENT
Start: 2022-06-21 | End: 2022-06-25

## 2022-06-21 RX ADMIN — ACETAMINOPHEN 650 MG: 325 TABLET ORAL at 02:17

## 2022-06-21 RX ADMIN — MAGNESIUM SULFATE HEPTAHYDRATE 2 G: 40 INJECTION, SOLUTION INTRAVENOUS at 09:55

## 2022-06-21 RX ADMIN — IPRATROPIUM BROMIDE AND ALBUTEROL SULFATE 3 ML: .5; 3 SOLUTION RESPIRATORY (INHALATION) at 09:06

## 2022-06-21 RX ADMIN — ONDANSETRON 4 MG: 4 TABLET, ORALLY DISINTEGRATING ORAL at 00:27

## 2022-06-21 RX ADMIN — VANCOMYCIN HYDROCHLORIDE 750 MG: 750 INJECTION, POWDER, LYOPHILIZED, FOR SOLUTION INTRAVENOUS at 02:07

## 2022-06-21 RX ADMIN — SODIUM CHLORIDE, PRESERVATIVE FREE 10 ML: 5 INJECTION INTRAVENOUS at 05:06

## 2022-06-21 RX ADMIN — SPIRONOLACTONE 50 MG: 25 TABLET ORAL at 09:57

## 2022-06-21 RX ADMIN — POTASSIUM BICARBONATE 40 MEQ: 782 TABLET, EFFERVESCENT ORAL at 05:12

## 2022-06-21 RX ADMIN — METOPROLOL TARTRATE 25 MG: 25 TABLET, FILM COATED ORAL at 09:57

## 2022-06-21 RX ADMIN — ENOXAPARIN SODIUM 40 MG: 100 INJECTION SUBCUTANEOUS at 10:07

## 2022-06-21 RX ADMIN — METRONIDAZOLE 500 MG: 500 INJECTION, SOLUTION INTRAVENOUS at 00:07

## 2022-06-21 RX ADMIN — CHOLECALCIFEROL TAB 25 MCG (1000 UNIT) 5000 UNITS: 25 TAB at 09:56

## 2022-06-21 RX ADMIN — DOXYCYCLINE HYCLATE 100 MG: 100 TABLET, COATED ORAL at 09:57

## 2022-06-21 RX ADMIN — POTASSIUM & SODIUM PHOSPHATES POWDER PACK 280-160-250 MG 1 PACKET: 280-160-250 PACK at 10:02

## 2022-06-21 RX ADMIN — BUDESONIDE 500 MCG: 0.5 INHALANT RESPIRATORY (INHALATION) at 09:06

## 2022-06-21 RX ADMIN — MONTELUKAST 10 MG: 10 TABLET, FILM COATED ORAL at 09:56

## 2022-06-21 RX ADMIN — LOSARTAN POTASSIUM 100 MG: 100 TABLET, FILM COATED ORAL at 10:02

## 2022-06-21 NOTE — ROUTINE PROCESS
End of Shift Note    Bedside shift change report given to Dimas Lugo  RN (oncoming nurse) by Pam Gibson RN (offgoing nurse). Report included the following information SBAR and ED Summary    Shift worked:  7p - 7a   Shift summary and any significant changes:    Potassium 3.5       Concerns for physician to address:  NONE   Zone phone for oncoming shift:  [phone]     Patient Information  Winferd Kocher  64 y.o.  6/18/2022  9:30 AM by Bryant Mchugh MD. Winferd Kocher was admitted from Home    Problem List  Patient Active Problem List    Diagnosis Date Noted    Cervicalgia 01/26/2022    Anxiety 01/26/2022    Radiculopathy of cervical spine 01/26/2022    Stress 08/25/2021    Trapezius muscle spasm 08/25/2021    Migraine with vertigo 08/25/2021    Hypokalemia 07/06/2021    Vertigo 09/02/2014    Health care maintenance 09/02/2014    Bronchiectasis ([facility] [address] 11/12/2013    HTN (hypertension) 06/28/2011    Hepatitis A 09/17/2010    Chronic tension headaches 09/17/2010    Insomnia 09/17/2010    DDD (degenerative disc disease) 09/17/2010    [place] syndrome 09/01/2010     Past Medical History:   Diagnosis Date    Asthma     Bilateral breast cysts 02/2021    Bronchiectasis     Bronchiectasis ([address] 12/2019    Bruxism     Chronic bronchitis     Hypertension     Presbyopia of both eyes 01/2019    Uterine myoma 12/2020    Vertigo 3/2014       Core Measures:  CVA: No No  CHF:No No  PNA:No No    Activity:  Activity Level:  Up with Assistance  Number times ambulated in hallways past shift: 0  Number of times OOB to chair past shift: 0    Cardiac:   Cardiac Monitoring: Yes      Cardiac Rhythm: Sinus Rhythm    Access:   Current line(s): PIV     Genitourinary:   Urinary status: voiding    Respiratory:   O2 Device: None (Room air)  Chronic home O2 use?: N/A  Incentive spirometer at bedside: N/A       GI:  Last Bowel Movement Date: 06/20/22  Current diet:  ADULT DIET Regular; Low Fat/Low Chol/High Fiber/PHILIP  Passing flatus: YES  Tolerating current diet: YES       Pain Management:   Patient states pain is manageable on current regimen: YES    Skin:  Damir Score: 22  Interventions: increase time out of bed    Patient Safety:  Fall Score:  Total Score: 1  Interventions: bed/chair alarm     @Rollbelt  @dexterity to release roll belt  Yes/No ( must document dexterity  here by stating Yes or No here, otherwise this is a restraint and must follow restraint documentation policy.)    DVT prophylaxis:  DVT prophylaxis Med- Yes  DVT prophylaxis SCD or JOB- No     Wounds: (If Applicable)  Wounds- No    Active Consults:  IP CONSULT TO HOSPITALIST  IP CONSULT TO NEPHROLOGY  IP CONSULT TO ENDOCRINOLOGY    Length of Stay:  Expected LOS: - - -  Actual LOS: 3  Discharge Plan: Bulmaro Maguire RN

## 2022-06-21 NOTE — DISCHARGE SUMMARY
Hospitalist Discharge Summary     Patient ID:  Radha Noble  [id number]  64 y.o.  1965 6/18/2022    PCP on record: None    Admit date: 6/18/2022  Discharge date and time: 6/21/2022    DISCHARGE DIAGNOSIS:  Sepsis present on admission  Acute hypokalemia present on admission  Acute hypophosphatemia  Community acquired bacterial pneumonia  Hyperaldosteronism      CONSULTATIONS:  IP CONSULT TO HOSPITALIST  IP CONSULT TO NEPHROLOGY  IP CONSULT TO ENDOCRINOLOGY    Excerpted HPI from H&P of Ernesto Pineda MD:  64 y.o. female known history of hypokalemia  lost to follow-up with [facility] nephrology Bandar Murray had outpatient labs with low potassium, PCP asked her to go to the emergency room.  Patient presented to [facility] ED.  Potassium was 2.0.  Patient was given IV and p.o. potassium.      ______________________________________________________________________  DISCHARGE SUMMARY/HOSPITAL COURSE:  for full details see H&P, daily progress notes, labs, consult notes. Patient was febrile, tachycardia and had white count on admission. CXR suggestive of pneumonia. Patient was started on antibiotics empirically with improvement in fever, white count and tachycardia. Potassium was repleted, and patient was started on spironolactone. MRI abdomen showed adrenal adenoma. Endocrine consulted. Patient's potassium has improved. Fever improving. Patient is being discharged on po antibiotics to complete course of antibiotics and po potassium supplements with PCP and endocrine follow up.           _______________________________________________________________________  Patient seen and examined by me on discharge day. Pertinent Findings:  Gen:    Not in distress  Chest: Clear lungs  CVS:   Regular rhythm.   No edema  Abd:  Soft, not distended, not tender  Neuro:  Alert, oriented  _______________________________________________________________________  DISCHARGE MEDICATIONS:   Current Discharge Medication List      START taking these medications    Details   spironolactone (ALDACTONE) 50 mg tablet Take 1 Tablet by mouth two (2) times a day. Qty: 60 Tablet, Refills: 0  Start date: 6/21/2022      potassium bicarb-citric acid (EFFER-K) 20 mEq tablet Take 2 Tablets by mouth two (2) times a day. Qty: 30 Tablet, Refills: 0  Start date: 6/21/2022      doxycycline (VIBRA-TABS) 100 mg tablet Take 1 Tablet by mouth every twelve (12) hours for 8 doses. Qty: 10 Tablet, Refills: 0  Start date: 6/21/2022, End date: 6/25/2022      amoxicillin-clavulanate (Augmentin) 875-125 mg per tablet Take 1 Tablet by mouth two (2) times a day. Qty: 10 Tablet, Refills: 0  Start date: 6/21/2022         CONTINUE these medications which have NOT CHANGED    Details   losartan (COZAAR) 100 mg tablet Take 1 Tablet by mouth daily. Qty: 30 Tablet, Refills: 0    Associated Diagnoses: Hypertension, unspecified type      meclizine (ANTIVERT) 12.5 mg tablet TAKE 2 TABLETS BY MOUTH 3 TIMES A DAY AS NEEDED FOR DIZZINESS FOR UP TO 10 DAYS. docosahexanoic acid-eicosapent 120-180 mg cap Take 1 Capsule by mouth daily. fremanezumab-vfrm (AJOVY) 225 mg/1.5 mL auto-injector 1.5 mL by SubCUTAneous route every thirty (30) days. Indications: migraine prevention  Qty: 1.5 mL, Refills: 5    Associated Diagnoses: Chronic migraine without aura without status migrainosus, not intractable      ubrogepant (Ubrelvy) 100 mg tablet Take one tablet at onset of migraine. Repeat one tablet in 2 hours if headache remains. Limit use to 2 days per week. Qty: 16 Tablet, Refills: 5    Associated Diagnoses: Chronic migraine without aura without status migrainosus, not intractable      triamcinolone (Nasacort) 55 mcg nasal inhaler 2 Sprays by Both Nostrils route daily. Qty: 1 Each, Refills: 5    Associated Diagnoses: Environmental allergies      ondansetron (ZOFRAN ODT) 4 mg disintegrating tablet Take 1 Tablet by mouth every eight (8) hours as needed for Nausea or Vomiting.   Qty: 30 Tablet, Refills: 0    Associated Diagnoses: Acute recurrent pansinusitis      mometasone (Asmanex Twisthaler) 220 mcg/ actuation (120) aepb inhaler Take 2 Puffs by inhalation two (2) times a day. Qty: 1 Each, Refills: 5    Associated Diagnoses: Environmental allergies      montelukast (SINGULAIR) 10 mg tablet Take 10 mg by mouth daily. metoprolol tartrate (LOPRESSOR) 100 mg IR tablet Take 25 mg by mouth daily. fluticasone propionate (FLONASE) 50 mcg/actuation nasal spray 2 Sprays by Both Nostrils route daily. ipratropium (ATROVENT HFA) 17 mcg/actuation inhaler 20 mcg by Other route every six (6) hours as needed for Wheezing. fish oil-omega-3 fatty acids 340-1,000 mg capsule Take 1 Capsule by mouth daily. cholecalciferol (Vitamin D3) (1000 Units /25 mcg) tablet Take 5,000 Units by mouth daily. STOP taking these medications       levonorgestreL (KYLEENA) 17.5 mcg/24 hrs (5 yrs) 19.5 mg IUD IUD Comments:   Reason for Stopping:                 Patient Follow Up Instructions: Activity: Activity as tolerated  Diet: Regular Diet  Wound Care: None needed    Follow-up with PCP, endocrine in 1 week.   Follow-up tests/labs potassium  Follow-up Information     Follow up With Specialties Details Why Contact Info    Jeff Chaves MD Family Medicine Go on 6/24/2022 at 10:20am for your PCP hospital follow up. [address]  [phone]          ________________________________________________________________    Risk of deterioration: Moderate    Condition at Discharge:  Stable  __________________________________________________________________    Disposition  Home with family, no needs    ____________________________________________________________________    Code Status: Full Code  ___________________________________________________________________      Total time in minutes spent coordinating this discharge (includes going over instructions, follow-up, prescriptions, and preparing report for sign off to her PCP) :  35 minutes    Signed:  Michael Meza MD

## 2022-06-21 NOTE — PROGRESS NOTES
Discharge paperwork reviewed with patient. Opportunity for questions and clarification provided. Patient verbalized understanding. Pt's daughter to transport home.

## 2022-06-21 NOTE — PROGRESS NOTES
I reviewed with the resident the medical history and the resident's findings on the physical examination. I discussed with the resident the patient's diagnosis and concur with the plan. Pt called by resident re critical results and directed to ER.

## 2022-06-21 NOTE — PROGRESS NOTES
Physician Progress Note      PATIENTRometta Duty  CSN #:                  [id number]  :                       1965  ADMIT DATE:       2022 9:30 AM  DISCH DATE:  RESPONDING  PROVIDER #:        Mitchel Edwards MD          QUERY TEXT:    Pt admitted  with hypokalemia, and on  noted to have sepsis. Please document in progress notes and discharge summary the POA status of sepsis:    The medical record reflects the following:  Risk Factors: 63 yo female whois intermittently SOB from bronchiectasis. She does not ambulate or walk as much    Clinical Indicators: day of admission WBC 13.5, HR up to 104, Temp up to 102.7 on .   LA and procal WNL  : Dr Jeni Sue PN: Wet cough, greenish sputum, Fever , Chills, Poor appetite   CXR: Right basilar opacities suspicious for pneumonia or aspiration  Sputum cx: HEAVY STREPTOCOCCI, BETA HEMOLYTIC GROUP A    Treatment: CXR,  IV Rocephine, Vancoymcin and Flagyl, Doxycycline po    Thank Lu Daniel RN, [facility]  [phone]  Options provided:  -- Yes, sepsis was present at the time of the order to admit to the hospital  -- No, sepsis was not present on admission and developed during the inpatient stay  -- Other - I will add my own diagnosis  -- Disagree - Not applicable / Not valid  -- Disagree - Clinically unable to determine / Unknown  -- Refer to Clinical Documentation Reviewer    PROVIDER RESPONSE TEXT:    Yes, sepsis was present at the time of the order to admit to the hospital.    Query created by: Danielle Dyer on 2022 8:04 AM      Electronically signed by:  Mitchel Edwards MD 2022 9:25 AM

## 2022-06-21 NOTE — PROGRESS NOTES
Pt is cleared for d/c from a CM standpoint. Transition of Care Plan:     RUR:   10% - Low  Disposition: Home w/ family  Follow up appointments: PCP - patient confirms PCP as Dr. Amauri Sanchez - [phone]  DME needed: No needs identified  Transportation at Discharge: Family   [place] or means to access home:      yes  IM Medicare Letter: N/A - Pt has [email or web address]  Is patient a BCPI-A Bundle:     N/A                 If yes, was Bundle Letter given?:    Is patient a  and connected with the [place]? N/A          If yes, was [facility] transfer form completed and [facility] notified? Caregiver Contact: - Son Van Tavera - [phone]  Discharge Caregiver contacted prior to discharge? Patient states she spoke to family - daughter at [facility] needed?:     Not at present time    11:01 a.m. SMAART tool on door for nursing. CM acknowledged d/c order. CM met with pt at bedside to discuss. Pt's daughter will transport pt home today. Care Management Interventions  PCP Verified by CM: Yes  Mode of Transport at Discharge: Other (see comment) (daughter)  Transition of Care Consult (CM Consult):  Other (home with family)  Discharge Durable Medical Equipment: No  Physical Therapy Consult: Yes  Occupational Therapy Consult: Yes  Speech Therapy Consult: No  Support Systems: Child(sandee)  Confirm Follow Up Transport: Family  The Patient and/or Patient Representative was Provided with a Choice of Provider and Agrees with the Discharge Plan?: Yes  Freedom of Choice List was Provided with Basic Dialogue that Supports the Patient's Individualized Plan of Care/Goals, Treatment Preferences and Shares the Quality Data Associated with the Providers?: Yes  Discharge Location  Patient Expects to be Discharged to[de-identified] Home with family assistance    INGRID Simental  Care Management, [address]

## 2022-06-21 NOTE — PROGRESS NOTES
Nephrology Progress Note  Perfecto Coleman     [facility]. [facility]  Phone - [phone]   Patient: Vito Guzman    YOB: 1965        Date- 6/21/2022   Admit Date: 6/18/2022  CC: Follow up for hypokalemia          IMPRESSION & PLAN:     hypokalemia-    Hyperaldosteronism- dx in 2017-- erik LEVEL 26 in 2017-- CT abdo showed 1.9 cm R adrenal nodule. -- MRI 15 mm right adrenal nodule  IN June 2022   Right adrenal adenoma   Hypertension   hypophosphatemia    PLAN-   kcl 40 meq tid   Continue aldactone 50 mg bid   Follow bmp in 1 week   Continue losartan   She will need adrenal vein sampling as out pt.  Check cortisol, DHEA-s and metanephrine levels as out pt  Appreciate input from endocrine. Subjective: Interval History:   bp stable  k improved to 3.5      /  6/20/22  She was seen by DR. Jef Shelton in 2017. She was recommenced to get right adrenalectomy done. She went RAZA after that. She has stopped taking kcl supplements and aldactone. Objective:   Vitals:    06/20/22 2340 06/21/22 0316 06/21/22 0823 06/21/22 1124   BP: 130/68 (!) 140/72 (!) 146/69 (!) 143/75   Pulse: 100 100 95 92   Resp: 20 20 19 18   Temp: 99.1 °F (37.3 °C) 99.2 °F (37.3 °C) 99.1 °F (37.3 °C) 98.4 °F (36.9 °C)   SpO2: 95% 96% 95% 94%   Weight:       Height:          No intake/output data recorded. Last 3 Recorded Weights in this Encounter    06/18/22 0929   Weight: 63.5 kg (139 lb 15.9 oz)      Physical exam:    . GEN:  NAD  NECK:  Supple, no thyromegaly  RESP: Clear  b/l, no  wheezing,   CVS: RRR,S1,S2   NEURO: non focal, normal speech    xamsphart reviewed. Pertinent Notes reviewed.      Data Review :  Recent Labs     06/21/22  0126 06/20/22  1515 06/19/22  0313    140 139   K 3.5 3.8 2.2*    104 104   CO2 29 33* 33*   BUN 6 6 5*   CREA 0.43* 0.48* 0.51*   * 116* 121*   CA 8.8 8.7 8.3*   MG 1.7 2.0 2.3   PHOS 2.5* 2.0* 1.5*     Recent Labs 06/21/22  1032 06/19/22  0313   WBC 8.1 12.3*   HGB 12.1 12.2   HCT 37.4 35.4   * 156     No results for input(s): FE, TIBC, PSAT, FERR in the last 72 hours.    Medication list  reviewed  Current Facility-Administered Medications   Medication Dose Route Frequency    magnesium sulfate 2 g/50 ml IVPB (premix or compounded)  2 g IntraVENous ONCE    potassium, sodium phosphates (NEUTRA-PHOS) packet 1 Packet  1 Packet Oral QID    doxycycline (VIBRA-TABS) tablet 100 mg  100 mg Oral Q12H    albuterol-ipratropium (DUO-NEB) 2.5 MG-0.5 MG/3 ML  3 mL Nebulization TID    budesonide (PULMICORT) 500 mcg/2 ml nebulizer suspension  500 mcg Nebulization BID RT    spironolactone (ALDACTONE) tablet 50 mg  50 mg Oral BID    potassium bicarb-citric acid (EFFER-K) tablet 40 mEq  40 mEq Oral Q8H    cefTRIAXone (ROCEPHIN) 1 g in 0.9% sodium chloride (MBP/ADV) 50 mL MBP  1 g IntraVENous Q24H    vancomycin (VANCOCIN) 750 mg in 0.9% sodium chloride 250 mL (VIAL-MATE)  750 mg IntraVENous Q8H    cholecalciferol (VITAMIN D3) (1000 Units /25 mcg) tablet 5,000 Units  5,000 Units Oral DAILY    fluticasone propionate (FLONASE) 50 mcg/actuation nasal spray 2 Spray  2 Spray Both Nostrils DAILY    ipratropium (ATROVENT HFA) 17 mcg inhaler  1 Puff Inhalation Q6H PRN    losartan (COZAAR) tablet 100 mg  100 mg Oral DAILY    meclizine (ANTIVERT) tablet 25 mg  25 mg Oral Q6H PRN    metoprolol tartrate (LOPRESSOR) tablet 25 mg  25 mg Oral DAILY    montelukast (SINGULAIR) tablet 10 mg  10 mg Oral DAILY    sodium chloride (NS) flush 5-40 mL  5-40 mL IntraVENous Q8H    sodium chloride (NS) flush 5-40 mL  5-40 mL IntraVENous PRN    acetaminophen (TYLENOL) tablet 650 mg  650 mg Oral Q6H PRN    Or    acetaminophen (TYLENOL) suppository 650 mg  650 mg Rectal Q6H PRN    polyethylene glycol (MIRALAX) packet 17 g  17 g Oral DAILY PRN    ondansetron (ZOFRAN ODT) tablet 4 mg  4 mg Oral Q8H PRN    Or    ondansetron (ZOFRAN) injection 4 mg  4 mg IntraVENous Q6H PRN    enoxaparin (LOVENOX) injection 40 mg  40 mg SubCUTAneous DAILY    aluminum-magnesium hydroxide (MAALOX) oral suspension 15 mL  15 mL Oral QID PRN          Trice Mckenna MD              [facility]  [facility]  [address], [address], [address]  Phone - [phone]               Fax - [phone]

## 2022-06-22 LAB
BACTERIA SPEC CULT: ABNORMAL
GRAM STN SPEC: ABNORMAL
SERVICE CMNT-IMP: ABNORMAL

## 2022-06-23 LAB — ALDOST SERPL-MCNC: 13.4 NG/DL (ref 0–30)

## 2022-06-25 LAB
BACTERIA SPEC CULT: NORMAL
RENIN PLAS-CCNC: <0.167 NG/ML/HR (ref 0.17–5.38)
SERVICE CMNT-IMP: NORMAL

## 2022-06-27 ENCOUNTER — VIRTUAL VISIT (OUTPATIENT)
Dept: FAMILY MEDICINE CLINIC | Age: 57
End: 2022-06-27
Payer: MEDICAID

## 2022-06-27 DIAGNOSIS — E26.9 HYPERALDOSTERONISM (HCC): ICD-10-CM

## 2022-06-27 DIAGNOSIS — D35.01 ADRENAL ADENOMA, RIGHT: ICD-10-CM

## 2022-06-27 DIAGNOSIS — Z09 HOSPITAL DISCHARGE FOLLOW-UP: Primary | ICD-10-CM

## 2022-06-27 DIAGNOSIS — J18.9 COMMUNITY ACQUIRED PNEUMONIA, UNSPECIFIED LATERALITY: ICD-10-CM

## 2022-06-27 DIAGNOSIS — E87.6 HYPOKALEMIA: ICD-10-CM

## 2022-06-27 PROCEDURE — 99496 TRANSJ CARE MGMT HIGH F2F 7D: CPT | Performed by: STUDENT IN AN ORGANIZED HEALTH CARE EDUCATION/TRAINING PROGRAM

## 2022-06-27 NOTE — PROGRESS NOTES
Alicja Amezcua  64 y.o. female  1965  [address]  [id number]    [phone] (home)      [address]:    Telephone Encounter  Felicia Disla MD       Encounter Date: 6/27/2022 at 1:44 PM    Consent: Alicja Amezcua, who was seen by synchronous (real-time) audio only technology, and/or her healthcare decision maker, is aware that this patient-initiated, Telehealth encounter on 6/27/2022 is a billable service, with coverage as determined by her insurance carrier. She is aware that she may receive a bill and has provided verbal consent to proceed: Yes. Chief Complaint   Patient presents with   [facility] Follow Up       History of Present Illness   Alicja Amezcua is a 64 y.o. female w/ PMHx of Asthma, Adrenal adenoma, HTN, Bronchiectasis, vertigo who was evaluated by telephone. I communicated with the patient and/or health care decision maker about recent hospitalization. Pt with recent hospitalization at [facility] (6/18-6/21) due to severe Hypokalemia, Sepsis due to CAP. Hyperaldosteronism/Hypokalemia: Received PO and IV K repletion. Nephrology was consulted. Pt K at discharge was 3.8. Sent home with Spironolactone 50 mg PO BID daily, Effer K 40 mEq PO BID daily. Will follow up with Endo (7/21) and Nephro as OP. Pt denies weakness, no numbness. CAP: Treated with broad spectrum antibiotics initially as patient was septic. Was discharged with Doxycycline and Augmentin PO BID for 5 more days. Pt will finished antibiotics 2 days ago. Pt still having mild clear productive cough. No fever, no CP, no SOB. Adrenal adenoma: Not new. On 6/19/22 MRI showed a 15mm, lipid rich R adrenal nodule. Was seen by Endo while admitted. Has upcoming appt for 7/21. Plan for further testing to rule out hypersecretion of cortisol, DHEA-s and metanephrine levels before she had AVS or any surgery. Pt aware of above.      Review of Systems ROS  Negative besides what is written in HPI. Vitals/Objective:   General: Patient speaking in complete sentences without effort. Normal speech and cooperative. Due to this being a Virtual Check-in/Telephone evaluation, many elements of the physical examination are unable to be assessed. Assessment and Plan:   Time-based coding, delete if not needed: I spent at least 25 minutes with this established patient, and >50% of the time was spent counseling and/or coordinating care regarding Treatment compliance, further test and specialty follow up. Total Time: minutes: 21-30 minutes    1. Hypokalemia: K: 3.8 at discharge. No numbness, weakness, muscle cramps. - Continue Spironolactone 50 mg PO BID daily,   - Continue Effer K 40 mEq PO BID daily. - CMP, Mg and Phosphorus ordered. 2. 0CAP: Treated with broad spectrum antibiotics initially as patient was septic. Fin[place]hed Doxycycline and Augmentin PO BID for 5 days. No fever, no CP, no SOB. 3.  R Adrenal adenoma: Not new. On 6/19/22 MRI showed a 15mm, lipid rich R adrenal nodule. Has upcoming appt for 7/21. Plan for further testing to rule out hypersecretion of cortisol, DHEA-s and metanephrine levels before she had AVS or any surgery. Pt aware of above. We discussed the expected course, resolution and complications of the diagnosis(es) in detail. Medication risks, benefits, costs, interactions, and alternatives were discussed as indicated. I advised her to contact the office if her condition worsens, changes or fails to improve as anticipated. She expressed understanding with the diagnosis(es) and plan. Patient understands that this encounter was a temporary measure, and the importance of further follow up and examination was emphasized. Patient verbalized understanding. Patient informed to follow up: As needed and will contact with lab results.      I affirm this is a Patient Initiated Episode with an Established Patient who has not had a related appointment within my department in the past 7 days or scheduled within the next 24 hours. Note: not billable if this call serves to triage the patient into an appointment for the relevant concern      Electronically Signed: Alexis Patiño MD  Providers location when delivering service: Home     Medicare:  [address]      ICD-10-CM ICD-9-CM    1. Hospital discharge follow-up  Z09 V67.59    2. Hypokalemia  E87.6 276.8    3. Hyperaldosteronism ([facility] [address]  E26.9 255.10    4. Adrenal adenoma, right  D35.01 227.0    5. Community acquired pneumonia, unspecified laterality  J18.9 5        Pursuant to the emergency declaration under the [address] [place] [address] waiver authority and the [employer] Act, this Virtual  Visit was conducted, with patient's consent, to reduce the patient's risk of exposure to COVID-19 and provide continuity of care for an established patient. History   Patients past medical, surgical and family histories were personally reviewed and updated.       Past Medical History:   Diagnosis Date    Asthma     Bilateral breast cysts 2021    Bronchiectasis     Bronchiectasis ([facility] [address] 2019    Bruxism     Chronic bronchitis     Hypertension     Presbyopia of both eyes 2019    Uterine myoma 2020    Vertigo 3/2014     Past Surgical History:   Procedure Laterality Date    HX BACK SURGERY      HX  SECTION  1990    x1 w/ twins    HX LOBECTOMY      RML     Family History   Problem Relation Age of Onset    Hypertension Mother     Glaucoma Sister     Hypertension Sister      Social History     Socioeconomic History    Marital status:      Spouse name: Not on file    Number of children: 3    Years of education: Not on file    Highest education level: Not on file   Occupational History    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   Tobacco Use    Smoking status: Never Smoker    Smokeless tobacco: Never Used   Substance and Sexual Activity    Alcohol use: No    Drug use: No    Sexual activity: Yes     Partners: Male   Other Topics Concern     Service Not Asked    Blood Transfusions Not Asked    Caffeine Concern Not Asked    Occupational Exposure Not Asked    Hobby Hazards Not Asked    Sleep Concern Not Asked    Stress Concern Not Asked    Weight Concern Not Asked    Special Diet Not Asked    Back Care Not Asked    Exercise Yes     Comment: each day    Bike Helmet Not Asked    Seat Belt Not Asked    Self-Exams Not Asked   Social History Narrative    Not on file     Social Determinants of Health     Financial Resource Strain:     Difficulty of Paying Living Expenses: Not on file   Food Insecurity:     Worried About Running Out of Food in the Last Year: Not on file    Pierre of Food in the Last Year: Not on file   Transportation Needs:     Lack of Transportation (Medical): Not on file    Lack of Transportation (Non-Medical):  Not on file   Physical Activity:     Days of Exercise per Week: Not on file    Minutes of Exercise per Session: Not on file   Stress:     Feeling of Stress : Not on file   Social Connections:     Frequency of Communication with Friends and Family: Not on file    Frequency of Social Gatherings with Friends and Family: Not on file    Attends Mosque Services: Not on file    Active Member of [address] [place] [address] [employer] or Organizations: Not on file    Attends Club or Organization Meetings: Not on file    Marital Status: Not on file   Intimate Partner Violence:     Fear of Current or Ex-Partner: Not on file    Emotionally Abused: Not on file    Physically Abused: Not on file    Sexually Abused: Not on file   Housing Stability:     Unable to Pay for Housing in the Last Year: Not on file    Number of [place] in the Last Year: Not on file    Unstable Housing in the Last Year: Not on file            Current Medications/Allergies Medications and Allergies reviewed:    Current Outpatient Medications   Medication Sig Dispense Refill    spironolactone (ALDACTONE) 50 mg tablet Take 1 Tablet by mouth two (2) times a day. 60 Tablet 0    potassium bicarb-citric acid (EFFER-K) 20 mEq tablet Take 2 Tablets by mouth two (2) times a day. 30 Tablet 0    amoxicillin-clavulanate (Augmentin) 875-125 mg per tablet Take 1 Tablet by mouth two (2) times a day. 10 Tablet 0    losartan (COZAAR) 100 mg tablet Take 1 Tablet by mouth daily. 30 Tablet 0    meclizine (ANTIVERT) 12.5 mg tablet TAKE 2 TABLETS BY MOUTH 3 TIMES A DAY AS NEEDED FOR DIZZINESS FOR UP TO 10 DAYS.  docosahexanoic acid-eicosapent 120-180 mg cap Take 1 Capsule by mouth daily.  fremanezumab-vfrm (AJOVY) 225 mg/1.5 mL auto-injector 1.5 mL by SubCUTAneous route every thirty (30) days. Indications: migraine prevention 1.5 mL 5    ubrogepant (Ubrelvy) 100 mg tablet Take one tablet at onset of migraine. Repeat one tablet in 2 hours if headache remains. Limit use to 2 days per week. 16 Tablet 5    triamcinolone (Nasacort) 55 mcg nasal inhaler 2 Sprays by Both Nostrils route daily. 1 Each 5    ondansetron (ZOFRAN ODT) 4 mg disintegrating tablet Take 1 Tablet by mouth every eight (8) hours as needed for Nausea or Vomiting. 30 Tablet 0    mometasone (Asmanex Twisthaler) 220 mcg/ actuation (120) aepb inhaler Take 2 Puffs by inhalation two (2) times a day. 1 Each 5    montelukast (SINGULAIR) 10 mg tablet Take 10 mg by mouth daily.  metoprolol tartrate (LOPRESSOR) 100 mg IR tablet Take 25 mg by mouth daily.  fluticasone propionate (FLONASE) 50 mcg/actuation nasal spray 2 Sprays by Both Nostrils route daily.  ipratropium (ATROVENT HFA) 17 mcg/actuation inhaler 20 mcg by Other route every six (6) hours as needed for Wheezing.  fish oil-omega-3 fatty acids 340-1,000 mg capsule Take 1 Capsule by mouth daily.       cholecalciferol (Vitamin D3) (1000 Units /25 mcg) tablet Take 5,000 Units by mouth daily.        Allergies   Allergen Reactions    Codeine Rash and Itching

## 2022-07-05 ENCOUNTER — PATIENT MESSAGE (OUTPATIENT)
Dept: FAMILY MEDICINE CLINIC | Age: 57
End: 2022-07-05

## 2022-07-05 DIAGNOSIS — E87.5 HYPERKALEMIA: Primary | ICD-10-CM

## 2022-07-06 ENCOUNTER — TELEPHONE (OUTPATIENT)
Dept: NEUROLOGY | Age: 57
End: 2022-07-06

## 2022-07-06 NOTE — TELEPHONE ENCOUNTER
Re: Ramírez Ruth PA request from [employer] portal. Created PA request in [address] [facility] [address], after review archived Key as pt is now using Ubrelvy.  Sent update to [facility] via weekly portal.

## 2022-07-16 DIAGNOSIS — I10 HYPERTENSION, UNSPECIFIED TYPE: ICD-10-CM

## 2022-07-17 RX ORDER — LOSARTAN POTASSIUM 100 MG/1
TABLET ORAL
Qty: 30 TABLET | Refills: 0 | Status: SHIPPED | OUTPATIENT
Start: 2022-07-17 | End: 2022-08-26 | Stop reason: SDUPTHER

## 2022-07-21 ENCOUNTER — OFFICE VISIT (OUTPATIENT)
Dept: ENDOCRINOLOGY | Age: 57
End: 2022-07-21
Payer: MEDICAID

## 2022-07-21 VITALS
HEIGHT: 69 IN | BODY MASS INDEX: 20.73 KG/M2 | HEART RATE: 91 BPM | WEIGHT: 140 LBS | SYSTOLIC BLOOD PRESSURE: 143 MMHG | DIASTOLIC BLOOD PRESSURE: 97 MMHG

## 2022-07-21 DIAGNOSIS — E26.9 HYPERALDOSTERONISM (HCC): ICD-10-CM

## 2022-07-21 DIAGNOSIS — E27.8 ADRENAL NODULE (HCC): Primary | ICD-10-CM

## 2022-07-21 PROCEDURE — 99215 OFFICE O/P EST HI 40 MIN: CPT | Performed by: INTERNAL MEDICINE

## 2022-07-21 RX ORDER — DEXAMETHASONE 1 MG/1
TABLET ORAL
Qty: 1 TABLET | Refills: 0 | Status: SHIPPED | OUTPATIENT
Start: 2022-07-21 | End: 2022-08-02

## 2022-07-21 NOTE — PROGRESS NOTES
Chief Complaint   Patient presents with    Follow-up     PCP and Pharmacy verified     History of Present Illness: Duong Triana is a 64 y.o. female here for follow up of hyperaldosteronism and an adrenal nodule. I initially saw her in the hospital in June 2022. Pt reported that she was previously seen by Dr. Samantha Miller of nephrology in 2017 for hypokalemia and was diagnosed with hyperaldosteronism. (Looking through her records she had an aldosterone of 283 in June 2017 in April 2016 her Aldosterone was normal at 26.1. She reported that Dr. Samantha Miller sent her for bilateral adrenal vein sampling at [facility] \"but he said the results were not good and would need to be repeated\". Dr. Samantha Miller started her on KCl. She moved to [facility] to be with family and Dr. Samantha Miller told her to follow up with a physician there. She notes that she never did, \"life distractions got in the way\". Pt moved back to [facility] a year ago\". She notes that she spoke with her PCP about the potassium and was told her potassium was fine and she was taken off the potassium \"about 8 months ago. \"    In the hospital her potassium was low at 2.2 and she was started on IV potassium and was started on Spironolactone. On 6/19/22 she was taken for an MRI and it showed a 15mm, lipid rich adrenal nodule. In the hospital she had an Aldosterone level drawn at 1020AM on 6/18/22. She receive her first dose of Spironolactone at 1700 on 6/18/22. The Aldosterone level came back at 13.4. She also had a random Cortisol level was 17. Pt was discharged home on Spironolactone 50mg daily and KCl 20meq BID. \"Since I received the hospital    I received your message and I stopped my Spironolactone on July 8th. My PCP has refilled my potassium, I am taking only one pill per day of the potassium. My BP at home has been very good they were running in the 120-130/70-80s.     Her BP today was 143/97    Pt notes she will get occasional dizziness, she denies CP, she does note some congestion in her chest and notes some \"pressure in my ears\". She has asthma but denies issues of SOB or GRACE. \"I had a panic attack and my PCP said I have a panic disorder. When I feel something occurring I start to breath in a paper bag and that feels better. \"  \"When I get the episodes I get nausea, dizziness, tremors and anxiety and I get  the pressure in my ear. \"  \"I am feeling much better and stronger since I have been out of the hospital\". Current Outpatient Medications   Medication Sig    dexAMETHasone (DECADRON) 1 mg tablet Take at 11PM the nigh before going for 8AM fasting labs. losartan (COZAAR) 100 mg tablet TAKE 1 TABLET BY MOUTH EVERY DAY    potassium bicarb-citric acid (EFFER-K) 20 mEq tablet Take 2 Tablets by mouth two (2) times a day. meclizine (ANTIVERT) 12.5 mg tablet TAKE 2 TABLETS BY MOUTH 3 TIMES A DAY AS NEEDED FOR DIZZINESS FOR UP TO 10 DAYS. docosahexanoic acid-eicosapent 120-180 mg cap Take 1 Capsule by mouth daily. fremanezumab-vfrm (AJOVY) 225 mg/1.5 mL auto-injector 1.5 mL by SubCUTAneous route every thirty (30) days. Indications: migraine prevention    triamcinolone (Nasacort) 55 mcg nasal inhaler 2 Sprays by Both Nostrils route daily. mometasone (Asmanex Twisthaler) 220 mcg/ actuation (120) aepb inhaler Take 2 Puffs by inhalation two (2) times a day. montelukast (SINGULAIR) 10 mg tablet Take 10 mg by mouth daily. fluticasone propionate (FLONASE) 50 mcg/actuation nasal spray 2 Sprays by Both Nostrils route daily. ipratropium (ATROVENT HFA) 17 mcg/actuation inhaler 20 mcg by Other route every six (6) hours as needed for Wheezing. fish oil-omega-3 fatty acids 340-1,000 mg capsule Take 1 Capsule by mouth daily. cholecalciferol (VITAMIN D3) (1000 Units /25 mcg) tablet Take 5,000 Units by mouth daily. No current facility-administered medications for this visit.      Allergies   Allergen Reactions    Codeine Rash and Itching Review of Systems:  - Cardiovascular: no chest pain  - Neurological: no tremors  - Integumentary: skin is normal    Physical Examination:  Blood pressure (!) 143/97, pulse 91, height 5' 9\" (1.753 m), weight 140 lb (63.5 kg). General: pleasant, no distress, good eye contact   Neck: no thyromegaly or thyroid bruits  Cardiovascular: regular, normal rate, nl s1 and s2, no m/r/g   Integumentary: skin is normal, no edema  Neurological: reflexes 2+ at biceps, no tremors  Psychiatric: normal mood and affect    Data Reviewed:   Component      Latest Ref Rng & Units 6/28/2022 6/18/2022   Sodium      136 - 145 mmol/L 138    Potassium      3.5 - 5.1 mmol/L 5.4 (H)    Chloride      97 - 108 mmol/L 105    CO2      21 - 32 mmol/L 27    Anion gap      5 - 15 mmol/L 6    Glucose      65 - 100 mg/dL 89    BUN      6 - 20 MG/DL 21 (H)    Creatinine      0.55 - 1.02 MG/DL 0.87    BUN/Creatinine ratio      12 - 20   24 (H)    GFR est AA      >60 ml/min/1.73m2 >60    GFR est non-AA      >60 ml/min/1.73m2 >60    Calcium      8.5 - 10.1 MG/DL 11.1 (H)    Bilirubin, total      0.2 - 1.0 MG/DL 0.8    ALT      12 - 78 U/L 32    AST      15 - 37 U/L 32    Alk. phosphatase      45 - 117 U/L 84    Protein, total      6.4 - 8.2 g/dL 8.9 (H)    Albumin      3.5 - 5.0 g/dL 3.6    Globulin      2.0 - 4.0 g/dL 5.3 (H)    A-G Ratio      1.1 - 2.2   0.7 (L)    Cortisol, a.m.      4.30 - 22.45 ug/dL  17.4   Aldosterone      0.0 - 30.0 ng/dL  13.4   Renin Activity      0.167 - 5.380 ng/mL/hr  <0.167 (L)       Assessment/Plan:   1) Hyperaldosteronism > Prior to 2016 she had a normal aldosterone, but after 2017 she did have high, but fluctuating aldosterone levels. In the hospital her Aldosterone level was not elevated and that was PRIOR to her receiving Spironolactone. Pt has been off the Spironolactone for 13 days.  I will order an AM fasting Aldosterone and Renin levels as well as renal function test to see what her potassium level is and to see if her Aldosterone level has increased. We discussed that if her Aldosterone level is high the next step will be adrenal vein sampling to confirm the excess production is from the adrenal nodule. 2) Adrenal nodule > Her MRI in June 2022 showed a 15mm right adrenal nodule, that was lipid rich. The size was unchanged from 2021. I will order an overnight dexamethasone suppression test with AM fasting ACTH, Cortisol, Aldosterone, Renin and DHEA-s to test her for any evidence of hypersecretion from the adrenal nodule. Pt voices understanding and agreement with the plan. RTC based on the above findings. I spent 40 minutes on this case and > 50% of the time was spent in counseling on the above issues. Patient Instructions   1) Dexamethasone: Take 1mg tablet at 11PM and the next day go to [facility], around 8AM to get your blood drawn. Do not eat after taking the pill and do not eat till AFTER you have had your blood drawn.

## 2022-07-21 NOTE — PATIENT INSTRUCTIONS
1) Dexamethasone: Take 1mg tablet at 11PM and the next day go to [facility], around 8AM to get your blood drawn. Do not eat after taking the pill and do not eat till AFTER you have had your blood drawn.

## 2022-07-22 ENCOUNTER — TELEPHONE (OUTPATIENT)
Dept: FAMILY MEDICINE CLINIC | Age: 57
End: 2022-07-22

## 2022-07-22 NOTE — TELEPHONE ENCOUNTER
Patient would like to go to a [facility] that is closer to her so that she can have all of her lab work done at the same time.  She would like the lab orders sent to the [facility] in [address] [place]

## 2022-07-30 LAB
ACTH PLAS-MCNC: 1.6 PG/ML (ref 7.2–63.3)
ALBUMIN SERPL-MCNC: 4.3 G/DL (ref 3.8–4.9)
ALDOST SERPL-MCNC: 10.5 NG/DL (ref 0–30)
BUN SERPL-MCNC: 11 MG/DL (ref 6–24)
BUN/CREAT SERPL: 18 (ref 9–23)
CALCIUM SERPL-MCNC: 9.9 MG/DL (ref 8.7–10.2)
CHLORIDE SERPL-SCNC: 105 MMOL/L (ref 96–106)
CO2 SERPL-SCNC: 26 MMOL/L (ref 20–29)
CORTIS AM PEAK SERPL-MCNC: 0.7 UG/DL (ref 6.2–19.4)
CREAT SERPL-MCNC: 0.62 MG/DL (ref 0.57–1)
DEXAMETHASONE SERPL-MCNC: 270 NG/DL
DHEA-S SERPL-MCNC: 20.5 UG/DL (ref 29.4–220.5)
EGFR: 104 ML/MIN/1.73
GLUCOSE SERPL-MCNC: 123 MG/DL (ref 65–99)
METANEPH FREE SERPL-MCNC: 15.9 PG/ML (ref 0–88)
NORMETANEPHRINE SERPL-MCNC: 57.2 PG/ML (ref 0–244)
PHOSPHATE SERPL-MCNC: 3.1 MG/DL (ref 3–4.3)
POTASSIUM SERPL-SCNC: 3.4 MMOL/L (ref 3.5–5.2)
RENIN PLAS-CCNC: <0.167 NG/ML/HR (ref 0.17–5.38)
SODIUM SERPL-SCNC: 145 MMOL/L (ref 134–144)

## 2022-08-02 ENCOUNTER — TELEPHONE (OUTPATIENT)
Dept: ENDOCRINOLOGY | Age: 57
End: 2022-08-02

## 2022-08-02 DIAGNOSIS — J01.41 ACUTE RECURRENT PANSINUSITIS: ICD-10-CM

## 2022-08-02 RX ORDER — POTASSIUM CHLORIDE 20 MEQ/1
20 TABLET, EXTENDED RELEASE ORAL DAILY
Qty: 90 TABLET | Refills: 3 | Status: SHIPPED | OUTPATIENT
Start: 2022-08-02 | End: 2022-08-26 | Stop reason: SDUPTHER

## 2022-08-02 RX ORDER — ONDANSETRON 4 MG/1
TABLET, ORALLY DISINTEGRATING ORAL
Qty: 30 TABLET | Refills: 0 | OUTPATIENT
Start: 2022-08-02

## 2022-08-02 NOTE — TELEPHONE ENCOUNTER
Spoke with pt regarding her labs. Since her Aldosterone level was not elevated I instructed her to stay off the Spironolactone. I increased her potasium to 20 meq per day. No evidence of cushing, pheochromocytoma or elevated DHEA-s.

## 2022-08-05 ENCOUNTER — APPOINTMENT (OUTPATIENT)
Dept: GENERAL RADIOLOGY | Age: 57
End: 2022-08-05
Attending: EMERGENCY MEDICINE
Payer: MEDICAID

## 2022-08-05 ENCOUNTER — HOSPITAL ENCOUNTER (EMERGENCY)
Age: 57
Discharge: HOME | End: 2022-08-05
Attending: EMERGENCY MEDICINE
Payer: MEDICAID

## 2022-08-05 VITALS
OXYGEN SATURATION: 98 % | HEART RATE: 71 BPM | SYSTOLIC BLOOD PRESSURE: 148 MMHG | HEIGHT: 69 IN | BODY MASS INDEX: 21.03 KG/M2 | WEIGHT: 142 LBS | RESPIRATION RATE: 18 BRPM | DIASTOLIC BLOOD PRESSURE: 98 MMHG | TEMPERATURE: 99.2 F

## 2022-08-05 DIAGNOSIS — J20.9 ACUTE BRONCHITIS, UNSPECIFIED ORGANISM: ICD-10-CM

## 2022-08-05 DIAGNOSIS — E87.6 HYPOKALEMIA: Primary | ICD-10-CM

## 2022-08-05 LAB
ALBUMIN SERPL-MCNC: 3.3 G/DL (ref 3.5–5)
ALBUMIN/GLOB SERPL: 0.8 {RATIO} (ref 1.1–2.2)
ALP SERPL-CCNC: 72 U/L (ref 45–117)
ALT SERPL-CCNC: 23 U/L (ref 12–78)
ANION GAP SERPL CALC-SCNC: 9 MMOL/L (ref 5–15)
AST SERPL-CCNC: 22 U/L (ref 15–37)
BASOPHILS # BLD: 0 K/UL (ref 0–0.1)
BASOPHILS NFR BLD: 0 % (ref 0–1)
BILIRUB SERPL-MCNC: 1.2 MG/DL (ref 0.2–1)
BUN SERPL-MCNC: 8 MG/DL (ref 6–20)
BUN/CREAT SERPL: 14 (ref 12–20)
CALCIUM SERPL-MCNC: 9.4 MG/DL (ref 8.5–10.1)
CHLORIDE SERPL-SCNC: 105 MMOL/L (ref 97–108)
CO2 SERPL-SCNC: 30 MMOL/L (ref 21–32)
CREAT SERPL-MCNC: 0.59 MG/DL (ref 0.55–1.02)
DIFFERENTIAL METHOD BLD: ABNORMAL
EOSINOPHIL # BLD: 0 K/UL (ref 0–0.4)
EOSINOPHIL NFR BLD: 0 % (ref 0–7)
ERYTHROCYTE [DISTWIDTH] IN BLOOD BY AUTOMATED COUNT: 13.5 % (ref 11.5–14.5)
GLOBULIN SER CALC-MCNC: 4.1 G/DL (ref 2–4)
GLUCOSE SERPL-MCNC: 100 MG/DL (ref 65–100)
HCT VFR BLD AUTO: 36 % (ref 35–47)
HGB BLD-MCNC: 12.3 G/DL (ref 11.5–16)
IMM GRANULOCYTES # BLD AUTO: 0 K/UL (ref 0–0.04)
IMM GRANULOCYTES NFR BLD AUTO: 0 % (ref 0–0.5)
LACTATE SERPL-SCNC: 0.7 MMOL/L (ref 0.4–2)
LYMPHOCYTES # BLD: 1.2 K/UL (ref 0.8–3.5)
LYMPHOCYTES NFR BLD: 12 % (ref 12–49)
MCH RBC QN AUTO: 29.6 PG (ref 26–34)
MCHC RBC AUTO-ENTMCNC: 34.2 G/DL (ref 30–36.5)
MCV RBC AUTO: 86.5 FL (ref 80–99)
MONOCYTES # BLD: 0.4 K/UL (ref 0–1)
MONOCYTES NFR BLD: 5 % (ref 5–13)
NEUTS SEG # BLD: 8.2 K/UL (ref 1.8–8)
NEUTS SEG NFR BLD: 83 % (ref 32–75)
NRBC # BLD: 0 K/UL (ref 0–0.01)
NRBC BLD-RTO: 0 PER 100 WBC
PLATELET # BLD AUTO: 205 K/UL (ref 150–400)
PMV BLD AUTO: 10.2 FL (ref 8.9–12.9)
POTASSIUM SERPL-SCNC: 2.5 MMOL/L (ref 3.5–5.1)
PROT SERPL-MCNC: 7.4 G/DL (ref 6.4–8.2)
RBC # BLD AUTO: 4.16 M/UL (ref 3.8–5.2)
SODIUM SERPL-SCNC: 144 MMOL/L (ref 136–145)
TROPONIN-HIGH SENSITIVITY: 53 NG/L (ref 0–51)
WBC # BLD AUTO: 9.9 K/UL (ref 3.6–11)

## 2022-08-05 PROCEDURE — 80053 COMPREHEN METABOLIC PANEL: CPT

## 2022-08-05 PROCEDURE — 74011250637 HC RX REV CODE- 250/637: Performed by: EMERGENCY MEDICINE

## 2022-08-05 PROCEDURE — 96361 HYDRATE IV INFUSION ADD-ON: CPT

## 2022-08-05 PROCEDURE — 87040 BLOOD CULTURE FOR BACTERIA: CPT

## 2022-08-05 PROCEDURE — 74011250636 HC RX REV CODE- 250/636: Performed by: EMERGENCY MEDICINE

## 2022-08-05 PROCEDURE — 71045 X-RAY EXAM CHEST 1 VIEW: CPT

## 2022-08-05 PROCEDURE — 96368 THER/DIAG CONCURRENT INF: CPT

## 2022-08-05 PROCEDURE — 83605 ASSAY OF LACTIC ACID: CPT

## 2022-08-05 PROCEDURE — 84484 ASSAY OF TROPONIN QUANT: CPT

## 2022-08-05 PROCEDURE — 74011250637 HC RX REV CODE- 250/637: Performed by: STUDENT IN AN ORGANIZED HEALTH CARE EDUCATION/TRAINING PROGRAM

## 2022-08-05 PROCEDURE — 96365 THER/PROPH/DIAG IV INF INIT: CPT

## 2022-08-05 PROCEDURE — 85025 COMPLETE CBC W/AUTO DIFF WBC: CPT

## 2022-08-05 PROCEDURE — 36415 COLL VENOUS BLD VENIPUNCTURE: CPT

## 2022-08-05 PROCEDURE — 93005 ELECTROCARDIOGRAM TRACING: CPT

## 2022-08-05 PROCEDURE — U0005 INFEC AGEN DETEC AMPLI PROBE: HCPCS

## 2022-08-05 PROCEDURE — 99285 EMERGENCY DEPT VISIT HI MDM: CPT

## 2022-08-05 RX ORDER — POTASSIUM CHLORIDE 750 MG/1
40 TABLET, FILM COATED, EXTENDED RELEASE ORAL
Status: COMPLETED | OUTPATIENT
Start: 2022-08-05 | End: 2022-08-05

## 2022-08-05 RX ORDER — POTASSIUM CHLORIDE 7.45 MG/ML
10 INJECTION INTRAVENOUS
Status: COMPLETED | OUTPATIENT
Start: 2022-08-05 | End: 2022-08-05

## 2022-08-05 RX ORDER — MAGNESIUM SULFATE HEPTAHYDRATE 40 MG/ML
2 INJECTION, SOLUTION INTRAVENOUS
Status: COMPLETED | OUTPATIENT
Start: 2022-08-05 | End: 2022-08-05

## 2022-08-05 RX ORDER — ACETAMINOPHEN 500 MG
1000 TABLET ORAL
Status: COMPLETED | OUTPATIENT
Start: 2022-08-05 | End: 2022-08-05

## 2022-08-05 RX ORDER — SODIUM CHLORIDE 0.9 % (FLUSH) 0.9 %
5-40 SYRINGE (ML) INJECTION EVERY 8 HOURS
Status: DISCONTINUED | OUTPATIENT
Start: 2022-08-05 | End: 2022-08-06 | Stop reason: HOSPADM

## 2022-08-05 RX ORDER — POTASSIUM CHLORIDE 7.45 MG/ML
20 INJECTION INTRAVENOUS
Status: DISCONTINUED | OUTPATIENT
Start: 2022-08-05 | End: 2022-08-05

## 2022-08-05 RX ADMIN — MAGNESIUM SULFATE HEPTAHYDRATE 2 G: 2 INJECTION, SOLUTION INTRAVENOUS at 19:51

## 2022-08-05 RX ADMIN — ACETAMINOPHEN 1000 MG: 500 TABLET ORAL at 20:06

## 2022-08-05 RX ADMIN — POTASSIUM CHLORIDE 40 MEQ: 750 TABLET, EXTENDED RELEASE ORAL at 18:24

## 2022-08-05 RX ADMIN — SODIUM CHLORIDE 1000 ML: 9 INJECTION, SOLUTION INTRAVENOUS at 17:21

## 2022-08-05 RX ADMIN — POTASSIUM CHLORIDE 10 MEQ: 7.46 INJECTION, SOLUTION INTRAVENOUS at 18:24

## 2022-08-05 RX ADMIN — SODIUM CHLORIDE 1000 ML: 9 INJECTION, SOLUTION INTRAVENOUS at 18:24

## 2022-08-05 NOTE — ED NOTES
Pt given crackers and soda per request. Pt resting quietly on stretcher in NAD. Pt denies complaints or requests at this time.

## 2022-08-05 NOTE — ED PROVIDER NOTES
EMERGENCY DEPARTMENT HISTORY AND PHYSICAL EXAM      Date: 8/5/2022  Patient Name: Henok Rawls    History of Presenting Illness     Chief Complaint   Patient presents with    Shortness of Breath       History Provided By: Patient    HPI: Henok Rawls, 64 y.o. female presents to the ED with cc of sob, cough, chills and fatigue for the past 3 days. Patient denies chest pain nausea vomiting or abdominal pain. There are no other associated symptoms, patient concerns, or physical findings at this time. I reviewed the vital signs, available nursing notes, past medical history, past surgical history, family history and social history. Vital Signs:  Patient Vitals for the past 12 hrs:   Temp Pulse Resp BP SpO2   08/05/22 1615 99.2 °F (37.3 °C) 87 16 (!) 159/88 95 %     Vital signs reviewed. Current Medications:  No current facility-administered medications on file prior to encounter. Current Outpatient Medications on File Prior to Encounter   Medication Sig Dispense Refill    potassium chloride (K-DUR, KLOR-CON M20) 20 mEq tablet Take 1 Tablet by mouth in the morning. 90 Tablet 3    losartan (COZAAR) 100 mg tablet TAKE 1 TABLET BY MOUTH EVERY DAY 30 Tablet 0    meclizine (ANTIVERT) 12.5 mg tablet TAKE 2 TABLETS BY MOUTH 3 TIMES A DAY AS NEEDED FOR DIZZINESS FOR UP TO 10 DAYS. docosahexanoic acid-eicosapent 120-180 mg cap Take 1 Capsule by mouth daily. fremanezumab-vfrm (AJOVY) 225 mg/1.5 mL auto-injector 1.5 mL by SubCUTAneous route every thirty (30) days. Indications: migraine prevention 1.5 mL 5    triamcinolone (Nasacort) 55 mcg nasal inhaler 2 Sprays by Both Nostrils route daily. 1 Each 5    mometasone (Asmanex Twisthaler) 220 mcg/ actuation (120) aepb inhaler Take 2 Puffs by inhalation two (2) times a day. 1 Each 5    montelukast (SINGULAIR) 10 mg tablet Take 10 mg by mouth daily.       fluticasone propionate (FLONASE) 50 mcg/actuation nasal spray 2 Sprays by Both Nostrils route daily.      ipratropium (ATROVENT HFA) 17 mcg/actuation inhaler 20 mcg by Other route every six (6) hours as needed for Wheezing. fish oil-omega-3 fatty acids 340-1,000 mg capsule Take 1 Capsule by mouth daily. cholecalciferol (VITAMIN D3) (1000 Units /25 mcg) tablet Take 5,000 Units by mouth daily. Past History     Past Medical History:  Past Medical History:   Diagnosis Date    Asthma     Bilateral breast cysts 2021    Bronchiectasis     Bronchiectasis ([address] 2019    Bruxism     Chronic bronchitis     Hypertension     Presbyopia of both eyes 2019    Uterine myoma 2020    Vertigo 3/2014       Past Surgical History:  Past Surgical History:   Procedure Laterality Date    HX BACK SURGERY      HX  SECTION  1990    x1 w/ twins    HX LOBECTOMY  1996    RML       Family History:  Family History   Problem Relation Age of Onset    Hypertension Mother     Glaucoma Sister     Hypertension Sister        Social History:  Social History     Tobacco Use    Smoking status: Never    Smokeless tobacco: Never   Substance Use Topics    Alcohol use: No    Drug use: No       Allergies: Allergies   Allergen Reactions    Codeine Rash and Itching         Review of Systems   Review of Systems   Constitutional:  Positive for chills and fatigue. Negative for fever. HENT:  Negative for sore throat. Eyes:  Negative for photophobia and redness. Respiratory:  Positive for cough and shortness of breath. Negative for wheezing. Cardiovascular:  Negative for chest pain and leg swelling. Gastrointestinal:  Negative for abdominal pain, blood in stool, nausea and vomiting. Genitourinary:  Negative for difficulty urinating, dysuria, hematuria, menstrual problem and vaginal bleeding. Musculoskeletal:  Negative for back pain and joint swelling. Neurological:  Negative for dizziness, seizures, syncope, speech difficulty, weakness, numbness and headaches. Hematological:  Negative for adenopathy. Psychiatric/Behavioral:  Negative for agitation, confusion and suicidal ideas. The patient is not nervous/anxious. All other systems reviewed and are negative. Physical Exam   Physical Exam  Vitals and nursing note reviewed. Constitutional:       General: She is not in acute distress. Appearance: She is well-developed. HENT:      Head: Normocephalic and atraumatic. Mouth/Throat:      Pharynx: No oropharyngeal exudate. Eyes:      General:         Left eye: No discharge. Extraocular Movements: Extraocular movements intact. Conjunctiva/sclera: Conjunctivae normal.      Pupils: Pupils are equal, round, and reactive to light. Neck:      Vascular: No JVD. Cardiovascular:      Rate and Rhythm: Normal rate and regular rhythm. Heart sounds: Normal heart sounds. Pulmonary:      Effort: Pulmonary effort is normal. No respiratory distress. Breath sounds: Examination of the right-middle field reveals rhonchi. Examination of the left-lower field reveals decreased breath sounds. Decreased breath sounds and rhonchi present. No wheezing. Comments: Diminished breath sounds on left base. Abdominal:      General: Bowel sounds are normal. There is no distension. Palpations: Abdomen is soft. Tenderness: There is no abdominal tenderness. There is no guarding or rebound. Musculoskeletal:         General: No tenderness. Normal range of motion. Cervical back: Normal range of motion and neck supple. Lymphadenopathy:      Cervical: No cervical adenopathy. Skin:     General: Skin is warm and dry. Findings: No rash. Neurological:      Mental Status: She is alert and oriented to person, place, and time. Cranial Nerves: No cranial nerve deficit. Deep Tendon Reflexes: Reflexes are normal and symmetric. Psychiatric:         Behavior: Behavior normal.       Emergency Department Course   ED Course:  Initial assessment performed.  The patient's complaints have been discussed, and they are in agreement with the care plan formulated and outlined with them. I have encouraged them to ask questions as they arise throughout their visit. EKG interpretation: (Preliminary)  Rhythm: normal sinus rhythm; and regular . Rate (approx.): 83; Axis: normal; MI interval: normal; QRS interval: normal ; ST/T wave: T wave inverted; Other findings: abnormal ekg. EKG read and interpreted by Gisele Barber MD     Medical Decision Making:  Asthma exacerbation, bronchitis, pneumonia, acute CHF, pneumothorax. Critical Care Time:CRITICAL CARE NOTE :    6:25 PM      IMPENDING DETERIORATION -Airway, Respiratory, Cardiovascular, and Metabolic    ASSOCIATED RISK FACTORS - Dysrhythmia, Metabolic changes, and Dehydration    MANAGEMENT- Bedside Assessment and Supervision of Care    INTERPRETATION -  Xrays, ECG, Blood Pressure, and Cardiac Output Measures     INTERVENTIONS - hemodynamic mngmt and Metobolic interventions    CASE REVIEW - Nursing and Family    TREATMENT RESPONSE -Stable    PERFORMED BY - Self        NOTES   :      I have spent 60 minutes of critical care time involved in lab review, consultations with specialist, family decision- making, bedside attention and documentation. During this entire length of time I was immediately available to the patient . Justin Pop MD                                             6:26 PM  Discussed pt's hx, disposition, and available diagnostic and imaging results with . Reviewed care plans. Agrees with plans as outlined. Care of pt transferred to [address] [place] [address] at 7:00 pm.   Written by Justin Pop MD    Procedure:      Progress note:   Time:    Disposition:  ADMITTED at 9:00 pm, patient is being admitted to the hospital. The results of their tests and reasons for their admission have been discussed with them and/or available family. They convey agreement and understanding for the need to be admitted and for their admission diagnosis. Consultation has been made with Eugenio Casiano for hospitalization. DISCHARGE PLAN:  1. Current Discharge Medication List        2. Follow-up Information    None       3. Return to ED if current symptoms worsen or new symptoms arise. 4. Follow up with None in 3-5 days. Diagnosis     Clinical Impression: No diagnosis found.

## 2022-08-05 NOTE — ED NOTES
Verbal shift change report given to Gale Vann RN (oncoming nurse) by Ezekiel Meraz RN (offgoing nurse). Report included the following information SBAR, ED Summary, MAR, and Recent Results.

## 2022-08-05 NOTE — ED NOTES
Left detailed message procedure cancelled. Surgery scheduling at [facility] notified. Will await return call to reschedule.    Verbal shift change report given to Lissa Baker RN (oncoming nurse) by Almeta Landau, RN (offgoing nurse). Report included the following information SBAR, ED Summary, MAR, and Recent Results.

## 2022-08-06 LAB
SARS-COV-2, XPLCVT: NOT DETECTED
SOURCE, COVRS: NORMAL

## 2022-08-06 NOTE — ED NOTES
Discharge instructions were given to the patient by Allie Fish RN. The patient left the Emergency Department ambulatory, alert and oriented and in no acute distress with 0 prescriptions. The patient was encouraged to call or return to the ED for worsening issues or problems and was encouraged to schedule a follow up appointment for continuing care. The patient verbalized understanding of discharge instructions and prescriptions, all questions were answered. The patient has no further concerns at this time.

## 2022-08-08 LAB
ATRIAL RATE: 83 BPM
CALCULATED P AXIS, ECG09: 78 DEGREES
CALCULATED R AXIS, ECG10: 72 DEGREES
CALCULATED T AXIS, ECG11: -41 DEGREES
DIAGNOSIS, 93000: NORMAL
P-R INTERVAL, ECG05: 152 MS
Q-T INTERVAL, ECG07: 338 MS
QRS DURATION, ECG06: 78 MS
QTC CALCULATION (BEZET), ECG08: 397 MS
VENTRICULAR RATE, ECG03: 83 BPM

## 2022-08-09 ENCOUNTER — TELEPHONE (OUTPATIENT)
Dept: FAMILY MEDICINE CLINIC | Age: 57
End: 2022-08-09

## 2022-08-09 ENCOUNTER — VIRTUAL VISIT (OUTPATIENT)
Dept: FAMILY MEDICINE CLINIC | Age: 57
End: 2022-08-09
Payer: MEDICAID

## 2022-08-09 DIAGNOSIS — E87.6 HYPOKALEMIA: ICD-10-CM

## 2022-08-09 DIAGNOSIS — I10 PRIMARY HYPERTENSION: Primary | ICD-10-CM

## 2022-08-09 PROCEDURE — 99213 OFFICE O/P EST LOW 20 MIN: CPT | Performed by: STUDENT IN AN ORGANIZED HEALTH CARE EDUCATION/TRAINING PROGRAM

## 2022-08-09 RX ORDER — HYDROCHLOROTHIAZIDE 12.5 MG/1
12.5 TABLET ORAL DAILY
Qty: 90 TABLET | Refills: 1 | Status: SHIPPED | OUTPATIENT
Start: 2022-08-09 | End: 2022-08-09

## 2022-08-09 NOTE — TELEPHONE ENCOUNTER
Called patient x 3 with no answer for visit today. If calls back, please reschedule appt.     Huma Thomas DO  Family Medicine Resident

## 2022-08-09 NOTE — PROGRESS NOTES
Dianna Chua  62 y.o. female  1965  [address]  [id number]   [address]:    Telemedicine Progress Note  Tomer Garcia DO       Encounter Date and Time: August 9, 2022 at 2:30 PM    Consent: Dianna Chua, who was seen by synchronous (real-time) audio-video technology, and/or her healthcare decision maker, is aware that this patient-initiated, Telehealth encounter on 8/9/2022 is a billable service, with coverage as determined by her insurance carrier. She is aware that she may receive a bill and has provided verbal consent to proceed: Yes. Dianna Chua, was evaluated through a synchronous (real-time) audio-video encounter. The patient (or guardian if applicable) is aware that this is a billable service, which includes applicable co-pays. This Virtual Visit was conducted with patient's (and/or legal guardian's) consent. The visit was conducted pursuant to the emergency declaration under the [address] [place] [address], [address] [place] [address] authority and the [employer] and [employer] Act. Patient identification was verified, and a caregiver was present when appropriate. The patient was located at: Home: [address] [place] [address]  The provider was located at: home           Chief Complaint   Patient presents with    Hypertension     History of Present Illness   Dianna Chua is a 62 y.o. female was evaluated by synchronous (real-time) audio-video technology from home, through a secure patient portal.    Hypertension: on losartan 100mg daily. BP running 140s/80s at home over the last 2 weeks. Reports BP at the ER was close to 160. Was on spironolactone for about a month but discontinued by endocrinologist for normal aldosterone level. Denies HA, dizziness.     Hypokalemia in the setting of hypoaldosteronism: Was seen in ER for fatigue last week and was found to have K of 2.5 - reportedly given K and Mg. Taking Klor con 20meq daily now, which was increased from 10meq. Review of Systems   Review of Systems   Constitutional:  Negative for chills and fever. HENT:  Negative for congestion. Respiratory:  Negative for cough and shortness of breath. Cardiovascular:  Negative for chest pain, palpitations and leg swelling. Gastrointestinal:  Negative for abdominal pain, nausea and vomiting. Skin:  Negative for rash. Vitals/Objective:     General: alert, cooperative, no distress   Mental  status: mental status: alert, oriented to person, place, and time, normal mood, behavior, speech, dress, motor activity, and thought processes   Resp: resp: normal effort and no respiratory distress   Neuro: neuro: no gross deficits   Skin: skin: no discoloration or lesions of concern on visible areas   Due to this being a TeleHealth evaluation, many elements of the physical examination are unable to be assessed. Assessment and Plan:   1. Primary hypertension BP log at home borderline. Would like < 140/90, but sounds like it runs right at that number. Prior office visits have been okay and ER visits show higher BP.  - continue losartan for now - may need to add amlodipine in the future or spironolactone again if endo agrees  - METABOLIC PANEL, BASIC; Future  - MAGNESIUM; Future    2. Hypokalemia On Klorcon 20meq daily. May need higher supplementation as was 2.5 in ER and given 40meq po and 10meq IV from what I can see along with Mg 2 g.  - METABOLIC PANEL, BASIC; Future  - MAGNESIUM; Future         We discussed the expected course, resolution and complications of the diagnosis(es) in detail. Medication risks, benefits, costs, interactions, and alternatives were discussed as indicated. I advised her to contact the office if her condition worsens, changes or fails to improve as anticipated. She expressed understanding with the diagnosis(es) and plan.  Patient understands that this encounter was a temporary measure, and the importance of further follow up and examination was emphasized. Patient verbalized understanding. Patient informed to follow up: Follow-up and Dispositions    Return in about 2 weeks (around 2022) for BP check. Electronically Signed: DO Zuly Soliz is a 62 y.o. female who was evaluated by an audio-video encounter for concerns as above. Patient identification was verified prior to start of the visit. A caregiver was present when appropriate. Due to this being a TeleHealth encounter (During  [facility] emergency), evaluation of the following organ systems was limited: Vitals/Constitutional/EENT/Resp/CV/GI//MS/Neuro/Skin/Heme-Lymph-Imm. Pursuant to the emergency declaration under the [facility], 1135 waiver authority and the [employer] Act, this Virtual Visit was conducted, with patient's (and/or legal guardian's) consent, to reduce the patient's risk of exposure to COVID-19 and provide necessary medical care. Services were provided through a synchronous discussion virtually to substitute for in-person clinic visit. I was at home. The patient was at home. History   Patients past medical, surgical and family histories were reviewed and updated.       Past Medical History:   Diagnosis Date    Asthma     Bilateral breast cysts 2021    Bronchiectasis     Bronchiectasis ([address] 2019    Bruxism     Chronic bronchitis     Hypertension     Presbyopia of both eyes 2019    Uterine myoma 2020    Vertigo 3/2014     Past Surgical History:   Procedure Laterality Date    HX BACK SURGERY      HX  SECTION  1990    x1 w/ twins    HX LOBECTOMY      RML     Family History   Problem Relation Age of Onset    Hypertension Mother     Glaucoma Sister     Hypertension Sister      Social History     Tobacco Use    Smoking status: Never Smokeless tobacco: Never   Substance Use Topics    Alcohol use: No    Drug use: No     Patient Active Problem List   Diagnosis Code    [place] syndrome E80.4    Hepatitis A B15.9    Chronic tension headaches G44.229    Insomnia G47.00    DDD (degenerative disc disease) [id number]    HTN (hypertension) I10    Bronchiectasis ([facility] J47.9    Vertigo R42    Health care maintenance Z00.00    Hypokalemia E87.6    Stress F43.9    Trapezius muscle spasm M62.838    Migraine with vertigo G43.109    Cervicalgia M54.2    Anxiety F41.9    Radiculopathy of cervical spine M54.12          Current Medications/Allergies   Medications and Allergies reviewed:    Current Outpatient Medications   Medication Sig Dispense Refill    potassium chloride (K-DUR, KLOR-CON M20) 20 mEq tablet Take 1 Tablet by mouth in the morning. 90 Tablet 3    losartan (COZAAR) 100 mg tablet TAKE 1 TABLET BY MOUTH EVERY DAY 30 Tablet 0    meclizine (ANTIVERT) 12.5 mg tablet TAKE 2 TABLETS BY MOUTH 3 TIMES A DAY AS NEEDED FOR DIZZINESS FOR UP TO 10 DAYS. docosahexanoic acid-eicosapent 120-180 mg cap Take 1 Capsule by mouth daily. fremanezumab-vfrm (AJOVY) 225 mg/1.5 mL auto-injector 1.5 mL by SubCUTAneous route every thirty (30) days. Indications: migraine prevention 1.5 mL 5    triamcinolone (Nasacort) 55 mcg nasal inhaler 2 Sprays by Both Nostrils route daily. 1 Each 5    mometasone (Asmanex Twisthaler) 220 mcg/ actuation (120) aepb inhaler Take 2 Puffs by inhalation two (2) times a day. 1 Each 5    montelukast (SINGULAIR) 10 mg tablet Take 10 mg by mouth daily. fluticasone propionate (FLONASE) 50 mcg/actuation nasal spray 2 Sprays by Both Nostrils route daily. ipratropium (ATROVENT HFA) 17 mcg/actuation inhaler 20 mcg by Other route every six (6) hours as needed for Wheezing. fish oil-omega-3 fatty acids 340-1,000 mg capsule Take 1 Capsule by mouth daily.       cholecalciferol (VITAMIN D3) (1000 Units /25 mcg) tablet Take 5,000 Units by mouth daily.        Allergies   Allergen Reactions    Codeine Rash and Itching

## 2022-08-09 NOTE — PROGRESS NOTES
[address] Medicine Residency Attending Addendum:  Dr. Rosalva Goodpasture, DO,  the patient and I were not physically present during this encounter. The resident and I are concurrently monitoring the patient care through appropriate telecommunication technology. I discussed the findings, assessment and plan with the resident and agree with the resident's findings and plan as documented in the resident's note.       Garett Trujillo MD

## 2022-08-10 LAB
BACTERIA SPEC CULT: NORMAL
SERVICE CMNT-IMP: NORMAL

## 2022-08-17 ENCOUNTER — OFFICE VISIT (OUTPATIENT)
Dept: FAMILY MEDICINE CLINIC | Age: 57
End: 2022-08-17
Payer: MEDICAID

## 2022-08-17 VITALS
WEIGHT: 140.4 LBS | TEMPERATURE: 98.4 F | RESPIRATION RATE: 16 BRPM | HEART RATE: 84 BPM | BODY MASS INDEX: 20.79 KG/M2 | DIASTOLIC BLOOD PRESSURE: 86 MMHG | HEIGHT: 69 IN | SYSTOLIC BLOOD PRESSURE: 132 MMHG | OXYGEN SATURATION: 97 %

## 2022-08-17 DIAGNOSIS — Z79.899 ENCOUNTER FOR LONG-TERM (CURRENT) USE OF MEDICATIONS: ICD-10-CM

## 2022-08-17 DIAGNOSIS — E87.6 HYPOKALEMIA: ICD-10-CM

## 2022-08-17 DIAGNOSIS — Z13.1 SCREENING FOR DIABETES MELLITUS: ICD-10-CM

## 2022-08-17 DIAGNOSIS — R53.82 CHRONIC FATIGUE: ICD-10-CM

## 2022-08-17 DIAGNOSIS — Z76.89 ESTABLISHING CARE WITH NEW DOCTOR, ENCOUNTER FOR: Primary | ICD-10-CM

## 2022-08-17 DIAGNOSIS — I10 PRIMARY HYPERTENSION: ICD-10-CM

## 2022-08-17 PROCEDURE — 99203 OFFICE O/P NEW LOW 30 MIN: CPT | Performed by: FAMILY MEDICINE

## 2022-08-17 RX ORDER — UBROGEPANT 100 MG/1
TABLET ORAL
COMMUNITY
Start: 2022-08-04

## 2022-08-17 RX ORDER — HYDROCHLOROTHIAZIDE 12.5 MG/1
12.5 TABLET ORAL DAILY
COMMUNITY
Start: 2022-08-09 | End: 2022-08-17 | Stop reason: ALTCHOICE

## 2022-08-17 NOTE — PROGRESS NOTES
Jose Miguel Birch is a 62 y.o. female, who's a new patient to our practice. Previous PCP: dr. Ronda Clayton in July 2022  Veronica Chester in June 2022  KATHERINE Magdaleno in May 2022  Last seen dr. Gabriel Mckeon 1 week ago, and she wants to change PCP     has a past medical history of Asthma, Bilateral breast cysts (02/2021), Bronchiectasis, Bronchiectasis ([address] (12/2019), Bruxism, Chronic bronchitis, Headache, Hypertension, Presbyopia of both eyes (01/2019), Uterine myoma (12/2020), and Vertigo (03/2014). HTN: Losartan 100mg    Asthma  Never smoker    C/o of chronic fatigues  Cbc TSH normal recently  Check B12, vit D    Wears hearing aids, tinnitis, vertigo  Sees ENT - \"they say nothing wrong\". Neuro: Dr. Haven Patel  also see them for vertigo and migraine - advised to f/up there  Kris Painter  ? adrenal nodule  Hypokalemia/ hyperaldosteronism  Advised her to f/up there. She wants to know her potassium right now and get send for infusion if needed. Told her we're not an ED. She can go there if need things done urgently  Potassium 20mg       Reviewed: active problem list, medication list, allergies, social history, health maintenance, notes from last encounter, lab results, imaging    A comprehensive review of systems was negative except for that written in the HPI, on 14 ROS. Allergies   Allergen Reactions    Codeine Rash and Itching     Current Outpatient Medications on File Prior to Visit   Medication Sig Dispense Refill    Ubrelvy 100 mg tablet TAKE 1 TAB AT ONSET OF MIGRAINE. REPEAT 1 TAB IN 2 HOURS IF HEADACHE REMAINS. LIMIT TO 2 DAYS/WK      potassium chloride (K-DUR, KLOR-CON M20) 20 mEq tablet Take 1 Tablet by mouth in the morning. 90 Tablet 3    losartan (COZAAR) 100 mg tablet TAKE 1 TABLET BY MOUTH EVERY DAY 30 Tablet 0    meclizine (ANTIVERT) 12.5 mg tablet TAKE 2 TABLETS BY MOUTH 3 TIMES A DAY AS NEEDED FOR DIZZINESS FOR UP TO 10 DAYS.       fremanezumab-vfrm (AJOVY) 225 mg/1.5 mL auto-injector 1.5 mL by SubCUTAneous route every thirty (30) days. Indications: migraine prevention 1.5 mL 5    triamcinolone (Nasacort) 55 mcg nasal inhaler 2 Sprays by Both Nostrils route daily. 1 Each 5    montelukast (SINGULAIR) 10 mg tablet Take 10 mg by mouth daily. fluticasone propionate (FLONASE) 50 mcg/actuation nasal spray 2 Sprays by Both Nostrils route daily. ipratropium (ATROVENT HFA) 17 mcg/actuation inhaler 20 mcg by Other route every six (6) hours as needed for Wheezing. cholecalciferol (VITAMIN D3) (1000 Units /25 mcg) tablet Take 5,000 Units by mouth daily. mometasone (Asmanex Twisthaler) 220 mcg/ actuation (120) aepb inhaler Take 2 Puffs by inhalation two (2) times a day. (Patient not taking: Reported on 8/17/2022) 1 Each 5    fish oil-omega-3 fatty acids 340-1,000 mg capsule Take 1 Capsule by mouth daily. (Patient not taking: Reported on 8/17/2022)       No current facility-administered medications on file prior to visit. Patient Active Problem List   Diagnosis Code    [place] syndrome E80.4    Hepatitis A B15.9    Chronic tension headaches G44.229    Insomnia G47.00    DDD (degenerative disc disease) [id number]    HTN (hypertension) I10    Bronchiectasis ([facility] J47.9    Vertigo R42    Health care maintenance Z00.00    Hypokalemia E87.6    Stress F43.9    Trapezius muscle spasm M62.838    Migraine with vertigo G43.109    Cervicalgia M54.2    Anxiety F41.9    Radiculopathy of cervical spine M54.12       Visit Vitals  /86   Pulse 84   Temp 98.4 °F (36.9 °C) (Temporal)   Resp 16   Ht 5' 9\" (1.753 m)   Wt 140 lb 6.4 oz (63.7 kg)   SpO2 97%   BMI 20.73 kg/m²     General appearance: alert, cooperative, no distress, appears stated age  Neurologic: Alert and oriented X 3, normal strength and tone, symmetric. Normal without focal findings. Cranial nerves 2-12 intact. Normal coordination and gait. Mental status: Alert, oriented, thought content appropriate, affect: stable, mood-congruent.     Head: Normocephalic, without obvious abnormality, atraumatic  Eyes: conjunctivae/corneas clear. PERRL, EOM's intact. Neck: supple, symmetrical, trachea midline, no JVD  Lungs: clear to auscultation bilaterally  Heart: regular rate and rhythm, S1, S2 normal, no murmur, click, rub or gallop  Abdomen: soft, non-tender. Extremities: extremities normal, atraumatic, no cyanosis or edema      Assessment/Plans:    Diagnoses and all orders for this visit:    1. Establishing care with new doctor, encounter for    2. Primary hypertension  -     METABOLIC PANEL, COMPREHENSIVE; Future    3. Hypokalemia  -     METABOLIC PANEL, COMPREHENSIVE; Future    4. Chronic fatigue  -     HEMOGLOBIN A1C WITH EAG; Future  -     METABOLIC PANEL, COMPREHENSIVE; Future  -     VITAMIN B12 & FOLATE; Future  -     VITAMIN D, 25 HYDROXY; Future    5. Encounter for long-term (current) use of medications  -     HEMOGLOBIN A1C WITH EAG; Future  -     METABOLIC PANEL, COMPREHENSIVE; Future  -     VITAMIN B12 & FOLATE; Future  -     VITAMIN D, 25 HYDROXY; Future    6. Screening for diabetes mellitus  -     HEMOGLOBIN A1C WITH EAG; Future    Discussed plans, risk/benefits of treatments/observations. Through the use of shared decision making, above plans were agreed upon. Medication compliance advised. Patient verbalized understanding. Follow-up and Dispositions    Return in about 2 days (around 8/19/2022) for chronic fatigue, anxiety, labs.          Nupur Shea MD  8/17/2022

## 2022-08-18 LAB
25(OH)D3+25(OH)D2 SERPL-MCNC: 53.4 NG/ML (ref 30–100)
ALBUMIN SERPL-MCNC: 4.2 G/DL (ref 3.8–4.9)
ALBUMIN/GLOB SERPL: 1.4 {RATIO} (ref 1.2–2.2)
ALP SERPL-CCNC: 93 IU/L (ref 44–121)
ALT SERPL-CCNC: 21 IU/L (ref 0–32)
AST SERPL-CCNC: 15 IU/L (ref 0–40)
BILIRUB SERPL-MCNC: 0.7 MG/DL (ref 0–1.2)
BUN SERPL-MCNC: 10 MG/DL (ref 6–24)
BUN/CREAT SERPL: 17 (ref 9–23)
CALCIUM SERPL-MCNC: 9.7 MG/DL (ref 8.7–10.2)
CHLORIDE SERPL-SCNC: 105 MMOL/L (ref 96–106)
CO2 SERPL-SCNC: 23 MMOL/L (ref 20–29)
CREAT SERPL-MCNC: 0.58 MG/DL (ref 0.57–1)
EGFR: 105 ML/MIN/1.73
EST. AVERAGE GLUCOSE BLD GHB EST-MCNC: 100 MG/DL
FOLATE SERPL-MCNC: 15.6 NG/ML
GLOBULIN SER CALC-MCNC: 2.9 G/DL (ref 1.5–4.5)
GLUCOSE SERPL-MCNC: 110 MG/DL (ref 65–99)
HBA1C MFR BLD: 5.1 % (ref 4.8–5.6)
POTASSIUM SERPL-SCNC: 3.5 MMOL/L (ref 3.5–5.2)
PROT SERPL-MCNC: 7.1 G/DL (ref 6–8.5)
SODIUM SERPL-SCNC: 143 MMOL/L (ref 134–144)
VIT B12 SERPL-MCNC: 588 PG/ML (ref 232–1245)

## 2022-08-26 ENCOUNTER — VIRTUAL VISIT (OUTPATIENT)
Dept: FAMILY MEDICINE CLINIC | Age: 57
End: 2022-08-26
Payer: MEDICAID

## 2022-08-26 DIAGNOSIS — E87.6 HYPOKALEMIA: ICD-10-CM

## 2022-08-26 DIAGNOSIS — I10 HYPERTENSION, UNSPECIFIED TYPE: ICD-10-CM

## 2022-08-26 DIAGNOSIS — F41.1 GENERALIZED ANXIETY DISORDER: Primary | ICD-10-CM

## 2022-08-26 DIAGNOSIS — J45.40 MODERATE PERSISTENT ASTHMA WITHOUT COMPLICATION: ICD-10-CM

## 2022-08-26 PROCEDURE — 99214 OFFICE O/P EST MOD 30 MIN: CPT | Performed by: FAMILY MEDICINE

## 2022-08-26 RX ORDER — LOSARTAN POTASSIUM 100 MG/1
100 TABLET ORAL DAILY
Qty: 30 TABLET | Refills: 2 | Status: SHIPPED | OUTPATIENT
Start: 2022-08-26 | End: 2022-10-17 | Stop reason: SDUPTHER

## 2022-08-26 RX ORDER — MINERAL OIL
ENEMA (ML) RECTAL
COMMUNITY

## 2022-08-26 RX ORDER — CITALOPRAM 10 MG/1
10 TABLET ORAL DAILY
Qty: 30 TABLET | Refills: 1 | Status: SHIPPED | OUTPATIENT
Start: 2022-08-26 | End: 2022-09-20 | Stop reason: SDUPTHER

## 2022-08-26 RX ORDER — ZINC GLUCONATE 10 MG
LOZENGE ORAL
COMMUNITY

## 2022-08-26 RX ORDER — POTASSIUM CHLORIDE 20 MEQ/1
20 TABLET, EXTENDED RELEASE ORAL DAILY
Qty: 90 TABLET | Refills: 3 | Status: SHIPPED | OUTPATIENT
Start: 2022-08-26

## 2022-08-26 RX ORDER — VITAMIN E 1000 UNIT
CAPSULE ORAL
COMMUNITY

## 2022-08-26 NOTE — PROGRESS NOTES
Chief Complaint   Patient presents with    Anxiety    Fatigue    Results     1 week check. 1. Have you been to the ER, urgent care clinic since your last visit? Hospitalized since your last visit? No    2. Have you seen or consulted any other health care providers outside of the [address] [place] [address] since your last visit? Include any pap smears or colon screening.  No

## 2022-08-26 NOTE — PROGRESS NOTES
Consent: Michell Clay, who was seen by synchronous (real-time) audio-video technology, and/or her healthcare decision maker, is aware that this patient-initiated, Telehealth encounter on 8/26/2022 is a billable service, with coverage as determined by her insurance carrier. She is aware that she may receive a bill and has provided verbal consent to proceed: Yes. Michell Clay is a 62 y.o. female    2nd visit w this physician,   He have seen 3 other PCP recently before me     and have 4 kids       has a past medical history of Asthma, Bilateral breast cysts (02/2021), Bronchiectasis, [address]asis ([facility] [address] (12/2019), Bruxism, Chronic bronchitis, Headache, Hypertension, Presbyopia of both eyes (01/2019), Uterine myoma (12/2020), and Vertigo (03/2014). Labs reviewed w pt    Anxiety: forever, the past year was the worse. I can't relax. My brain keep thinking, I can't turn off. Anxiety. Worries about everything. Doesn't sleep well. Denies depression  Her children have anxiety  Never have talked to psych or therapist in the past.   Denies soledad  Denies visual or auditory hallucination or delusion  Denies SI or HI  Start Celexa      Insomnia lays in bed up to 10hrs, but only sleep 5hrs carmel doesn't want anything for this now    HTN: Losartan 100mg     Hypokalemia takes K20meq kuhn    PUlm managed by specialist  Asthma  Never smoker      Wears hearing aids, tinnitis, vertigo  Sees ENT - \"they say nothing wrong\". Neuro: Dr. Everardo Quintero  also see them for vertigo and migraine - advised to f/up there  [place]     Dr. Stormy Perez  ? adrenal nodule  Hypokalemia/ hyperaldosteronism  Advised her to f/up there. She wants to know her potassium right now and get send for infusion if needed. Told her we're not an ED.  She can go there if need things done urgently  Potassium 20mg         Reviewed: active problem list, medication list, allergies, notes from last encounter, lab results    A comprehensive review of systems was negative except for that written in the HPI. Assessment & Plan:   Diagnoses and all orders for this visit:    1. Generalized anxiety disorder  -     citalopram (CELEXA) 10 mg tablet; Take 1 Tablet by mouth daily. 2. Hypertension, unspecified type  -     losartan (COZAAR) 100 mg tablet; Take 1 Tablet by mouth daily. 3. Hypokalemia  -     potassium chloride (K-DUR, KLOR-CON M20) 20 mEq tablet; Take 1 Tablet by mouth daily. 4. Moderate persistent asthma without complication  -     ipratropium (ATROVENT HFA) 17 mcg/actuation inhaler; Take 1 Puff by inhalation every six (6) hours as needed for Wheezing. Follow-up and Dispositions    Return in about 3 weeks (around 9/16/2022) for anxiety, new meds. 712  Subjective: Manjeet العلي is a 62 y.o. female who was seen for Anxiety, Fatigue, and Results      Prior to Admission medications    Medication Sig Start Date End Date Taking? Authorizing Provider   fexofenadine (ALLEGRA) 180 mg tablet Take  by mouth. Yes Provider, Historical   magnesium 250 mg tab Take  by mouth. Yes Provider, Historical   ascorbic acid, vitamin C, (Vitamin C) 1,000 mg tablet Take  by mouth. Yes Provider, Historical   zinc sulfate (ZINC-220 PO) Take  by mouth. Yes Provider, Historical   losartan (COZAAR) 100 mg tablet Take 1 Tablet by mouth daily. 8/26/22  Yes Kameron Slade MD   citalopram (CELEXA) 10 mg tablet Take 1 Tablet by mouth daily. 8/26/22  Yes Neeru Norton MD   potassium chloride (K-DUR, KLOR-CON M20) 20 mEq tablet Take 1 Tablet by mouth daily. 8/26/22  Yes Kameron Slade MD   ipratropium (ATROVENT HFA) 17 mcg/actuation inhaler Take 1 Puff by inhalation every six (6) hours as needed for Wheezing. 8/26/22  Yes Neeru Norton MD   Ubrelvy 100 mg tablet TAKE 1 TAB AT ONSET OF MIGRAINE.  REPEAT 1 TAB IN 2 HOURS IF HEADACHE REMAINS. LIMIT TO 2 DAYS/WK 8/4/22  Yes Provider, Historical   meclizine (ANTIVERT) 12.5 mg tablet TAKE 2 TABLETS BY MOUTH 3 TIMES A DAY AS NEEDED FOR DIZZINESS FOR UP TO 10 DAYS. 5/25/22  Yes Provider, Historical   fremanezumab-vfrm (AJOVY) 225 mg/1.5 mL auto-injector 1.5 mL by SubCUTAneous route every thirty (30) days. Indications: migraine prevention 6/8/22  Yes Karey Pacheco MD   fluticasone propionate (FLONASE) 50 mcg/actuation nasal spray 2 Sprays by Both Nostrils route daily. Yes Provider, Historical   cholecalciferol (VITAMIN D3) (1000 Units /25 mcg) tablet Take 5,000 Units by mouth daily. Yes Provider, Historical   triamcinolone (Nasacort) 55 mcg nasal inhaler 2 Sprays by Both Nostrils route daily. Patient not taking: Reported on 8/26/2022 5/19/22   Mayra Tucker MD   mometasone (Asmanex Twisthaler) 220 mcg/ actuation (120) aepb inhaler Take 2 Puffs by inhalation two (2) times a day. Patient not taking: No sig reported 10/14/21   Mayra Tucker MD   montelukast (SINGULAIR) 10 mg tablet Take 10 mg by mouth daily. Patient not taking: Reported on 8/26/2022 8/9/21   Provider, Historical   fish oil-omega-3 fatty acids 340-1,000 mg capsule Take 1 Capsule by mouth daily. Patient not taking: No sig reported    Provider, Historical     Allergies   Allergen Reactions    Codeine Rash and Itching         Objective:   Vital Signs: (As obtained by patient/caregiver at home)  There were no vitals taken for this visit.      Constitutional: [x] Appears well-developed and well-nourished [x] No apparent distress        Mental status: [x] Alert and awake  [x] Oriented to person/place/time [x] Able to follow commands      Eyes:   EOM    [x]  Normal      Sclera  [x]  Normal              Discharge [x]  None visible       HENT: [x] Normocephalic, atraumatic    [] Mouth/Throat: Mucous membranes are moist    External Ears [x] Normal      Neck: [x] No visualized mass     Pulmonary/Chest: [x] Respiratory effort normal   [x] No visualized signs of difficulty breathing or respiratory distress           Musculoskeletal:   [x] Normal gait with no signs of ataxia         [x] Normal range of motion of neck         Neurological:        [x] No Facial Asymmetry (Cranial nerve 7 motor function) (limited exam due to video visit)          [x] No gaze palsy              Skin:        [x] No significant exanthematous lesions or discoloration noted on facial skin                  Psychiatric:       [x] Normal Affect [] Abnormal -        [x] No Hallucinations      We discussed the expected course, resolution and complications of the diagnosis(es) in detail. Medication risks, benefits, costs, interactions, and alternatives were discussed as indicated. I advised her to contact the office if her condition worsens, changes or fails to improve as anticipated. She expressed understanding with the diagnosis(es) and plan. Cat Aceves is a 62 y.o. female being evaluated by a video visit encounter for concerns as above. A caregiver was present when appropriate. Due to this being a TeleHealth encounter (During WRS-03 public health emergency), evaluation of the following organ systems was limited: Vitals/Constitutional/EENT/Resp/CV/GI//MS/Neuro/Skin/Heme-Lymph-Imm. Pursuant to the emergency declaration under the [facility], 1135 waiver authority and the [employer] and [employer] General Act, this Virtual  Visit was conducted, with patient's (and/or legal guardian's) consent, to reduce the patient's risk of exposure to COVID-19 and provide necessary medical care. Services were provided through a video synchronous discussion virtually to substitute for in-person clinic visit. Patient and provider were located at their individual homes.         Matheus Jade MD

## 2022-09-13 ENCOUNTER — HOSPITAL ENCOUNTER (OUTPATIENT)
Dept: MAMMOGRAPHY | Age: 57
Discharge: HOME | End: 2022-09-13
Attending: STUDENT IN AN ORGANIZED HEALTH CARE EDUCATION/TRAINING PROGRAM
Payer: MEDICAID

## 2022-09-13 DIAGNOSIS — Z12.31 SCREENING MAMMOGRAM FOR BREAST CANCER: ICD-10-CM

## 2022-09-13 PROCEDURE — 77067 SCR MAMMO BI INCL CAD: CPT

## 2022-09-20 ENCOUNTER — VIRTUAL VISIT (OUTPATIENT)
Dept: FAMILY MEDICINE CLINIC | Age: 57
End: 2022-09-20
Payer: MEDICAID

## 2022-09-20 DIAGNOSIS — M62.838 NECK MUSCLE SPASM: ICD-10-CM

## 2022-09-20 DIAGNOSIS — F41.1 GENERALIZED ANXIETY DISORDER: Primary | ICD-10-CM

## 2022-09-20 DIAGNOSIS — R22.1 LUMP IN NECK: ICD-10-CM

## 2022-09-20 DIAGNOSIS — R42 VERTIGO: ICD-10-CM

## 2022-09-20 PROCEDURE — 99214 OFFICE O/P EST MOD 30 MIN: CPT | Performed by: FAMILY MEDICINE

## 2022-09-20 RX ORDER — CITALOPRAM 10 MG/1
10 TABLET ORAL DAILY
Qty: 30 TABLET | Refills: 1 | Status: SHIPPED | OUTPATIENT
Start: 2022-09-20 | End: 2022-10-17 | Stop reason: SDUPTHER

## 2022-09-20 RX ORDER — MECLIZINE HCL 12.5 MG 12.5 MG/1
12.5 TABLET ORAL
Qty: 30 TABLET | Refills: 0 | Status: SHIPPED | OUTPATIENT
Start: 2022-09-20

## 2022-09-20 NOTE — PROGRESS NOTES
Chief Complaint   Patient presents with    Anxiety     3 week check       1. Have you been to the ER, urgent care clinic since your last visit? Hospitalized since your last visit? No    2. Have you seen or consulted any other health care providers outside of the [address] [place] [address] since your last visit? Include any pap smears or colon screening.  No

## 2022-09-20 NOTE — PROGRESS NOTES
Consent: Ad Champion, who was seen by synchronous (real-time) audio-video technology, and/or her healthcare decision maker, is aware that this patient-initiated, Telehealth encounter on 9/20/2022 is a billable service, with coverage as determined by her insurance carrier. She is aware that she may receive a bill and has provided verbal consent to proceed: Yes. Ad Champion is a 62 y.o. 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 Labs reviewed w pt    Anxiety: forever, the past year was the worse. I can't relax. My brain keep thinking, I can't turn off. Anxiety. Worries about everything. Doesn't sleep well. Denies depression  Her children have anxiety  Never have talked to psych or therapist in the past.   Denies soledad  Denies visual or auditory hallucination or delusion  3 wks ago we Started Celexa   Sleep is better. Anxiety a little better. Denies SI or HI  Continue course    She sees chiropractor, says she have inflammation in spine, she wanted to see Spine ortho. Says she have lymph nodes around her neck. We'll f/up in office on this   Possible trigger point injection    Insomnia lays in bed up to 10hrs, but only sleep 5hrs carmel doesn't want anything for this now    HTN: Losartan 100mg     Hypokalemia takes K20meq daily    PUlm managed by specialist  Asthma  Never smoker      Wears hearing aids, tinnitis, vertigo  Sees ENT - \"they say nothing wrong\". Neuro: Dr. Parmjit Johnson  also see them for vertigo and migraine - advised to f/up there  [place]  She wants refill of meclizine 12.5 mg she only take prn about 3 X a week. Dr. Julianna Baer  ? adrenal nodule  Hypokalemia/ hyperaldosteronism  Advised her to f/up there. Potassium 20mg         Reviewed: active problem list, medication list, allergies, notes from last encounter, lab results    A comprehensive review of systems was negative except for that written in the HPI. Assessment & Plan:   Diagnoses and all orders for this visit:    1. Generalized anxiety disorder  -     citalopram (CELEXA) 10 mg tablet; Take 1 Tablet by mouth daily. 2. Vertigo  -     meclizine (ANTIVERT) 12.5 mg tablet; Take 1 Tablet by mouth every seventy-two (72) hours as needed for Dizziness. 3. Neck muscle spasm    4. Lump in neck      Follow-up and Dispositions    Return for neck lumps, muscle spasm possible trigger point injection. 712  Subjective: Casie Snow is a 62 y.o. female who was seen for Anxiety (3 week check)      Prior to Admission medications    Medication Sig Start Date End Date Taking? Authorizing Provider   meclizine (ANTIVERT) 12.5 mg tablet Take 1 Tablet by mouth every seventy-two (72) hours as needed for Dizziness. 9/20/22  Yes Waqas Velásquez MD   citalopram (CELEXA) 10 mg tablet Take 1 Tablet by mouth daily. 9/20/22  Yes Waqas Velásquez MD   fexofenadine (ALLEGRA) 180 mg tablet Take  by mouth. Yes Provider, Historical   magnesium 250 mg tab Take  by mouth. Yes Provider, Historical   ascorbic acid, vitamin C, (VITAMIN C) 1,000 mg tablet Take  by mouth. Yes Provider, Historical   zinc sulfate (ZINC-220 PO) Take  by mouth. Yes Provider, Historical   losartan (COZAAR) 100 mg tablet Take 1 Tablet by mouth daily. 8/26/22  Yes Jose Norton MD   potassium chloride (K-DUR, KLOR-CON M20) 20 mEq tablet Take 1 Tablet by mouth daily. 8/26/22  Yes Waqas Velásquez MD   ipratropium (ATROVENT HFA) 17 mcg/actuation inhaler Take 1 Puff by inhalation every six (6) hours as needed for Wheezing. 8/26/22  Yes Jose Norton MD   Ubrelvy 100 mg tablet TAKE 1 TAB AT ONSET OF MIGRAINE.  REPEAT 1 TAB IN 2 HOURS IF HEADACHE REMAINS. LIMIT TO 2 DAYS/WK 8/4/22  Yes Provider, Historical   fremanezumab-vfrm (AJOVY) 225 mg/1.5 mL auto-injector 1.5 mL by SubCUTAneous route every thirty (30) days. Indications: migraine prevention 6/8/22  Yes Elver Wick MD   montelukast (SINGULAIR) 10 mg tablet Take 10 mg by mouth daily. 8/9/21  Yes Provider, Historical   fluticasone propionate (FLONASE) 50 mcg/actuation nasal spray 2 Sprays by Both Nostrils route daily. Yes Provider, Historical   fish oil-omega-3 fatty acids 340-1,000 mg capsule Take 1 Capsule by mouth daily. Yes Provider, Historical   cholecalciferol (VITAMIN D3) (1000 Units /25 mcg) tablet Take 5,000 Units by mouth daily. Yes Provider, Historical   triamcinolone (Nasacort) 55 mcg nasal inhaler 2 Sprays by Both Nostrils route daily. Patient not taking: No sig reported 5/19/22   Prachi Talbert MD   mometasone (Asmanex Twisthaler) 220 mcg/ actuation (120) aepb inhaler Take 2 Puffs by inhalation two (2) times a day. Patient not taking: No sig reported 10/14/21   Prachi Talbert MD     Allergies   Allergen Reactions    Codeine Rash and Itching         Objective:   Vital Signs: (As obtained by patient/caregiver at home)  There were no vitals taken for this visit.      Constitutional: [x] Appears well-developed and well-nourished [x] No apparent distress        Mental status: [x] Alert and awake  [x] Oriented to person/place/time [x] Able to follow commands      Eyes:   EOM    [x]  Normal      Sclera  [x]  Normal              Discharge [x]  None visible       HENT: [x] Normocephalic, atraumatic    [] Mouth/Throat: Mucous membranes are moist    External Ears [x] Normal      Neck: [x] No visualized mass     Pulmonary/Chest: [x] Respiratory effort normal   [x] No visualized signs of difficulty breathing or respiratory distress           Musculoskeletal:   [x] Normal gait with no signs of ataxia         [x] Normal range of motion of neck Neurological:        [x] No Facial Asymmetry (Cranial nerve 7 motor function) (limited exam due to video visit)          [x] No gaze palsy              Skin:        [x] No significant exanthematous lesions or discoloration noted on facial skin                  Psychiatric:       [x] Normal Affect [] Abnormal -        [x] No Hallucinations      We discussed the expected course, resolution and complications of the diagnosis(es) in detail. Medication risks, benefits, costs, interactions, and alternatives were discussed as indicated. I advised her to contact the office if her condition worsens, changes or fails to improve as anticipated. She expressed understanding with the diagnosis(es) and plan. Swetha Holland is a 62 y.o. female being evaluated by a video visit encounter for concerns as above. A caregiver was present when appropriate. Due to this being a TeleHealth encounter (During VJSGV-02 public health emergency), evaluation of the following organ systems was limited: Vitals/Constitutional/EENT/Resp/CV/GI//MS/Neuro/Skin/Heme-Lymph-Imm. Pursuant to the emergency declaration under the [facility] waiver authority and the [employer] Act, this Virtual  Visit was conducted, with patient's (and/or legal guardian's) consent, to reduce the patient's risk of exposure to COVID-19 and provide necessary medical care. Services were provided through a video synchronous discussion virtually to substitute for in-person clinic visit. Patient and provider were located at their individual homes.         Dierdre Brunner, MD

## 2022-09-29 ENCOUNTER — OFFICE VISIT (OUTPATIENT)
Dept: FAMILY MEDICINE CLINIC | Age: 57
End: 2022-09-29
Payer: MEDICAID

## 2022-09-29 VITALS
HEART RATE: 76 BPM | SYSTOLIC BLOOD PRESSURE: 169 MMHG | OXYGEN SATURATION: 97 % | WEIGHT: 149 LBS | RESPIRATION RATE: 16 BRPM | HEIGHT: 69 IN | TEMPERATURE: 96.9 F | BODY MASS INDEX: 22.07 KG/M2 | DIASTOLIC BLOOD PRESSURE: 99 MMHG

## 2022-09-29 DIAGNOSIS — M62.838 NECK MUSCLE SPASM: ICD-10-CM

## 2022-09-29 DIAGNOSIS — M79.10 MYALGIA: ICD-10-CM

## 2022-09-29 DIAGNOSIS — R22.1 NECK MASS: ICD-10-CM

## 2022-09-29 DIAGNOSIS — R59.0 POSTERIOR AURICULAR LYMPHADENOPATHY: ICD-10-CM

## 2022-09-29 DIAGNOSIS — G89.4 CHRONIC PAIN SYNDROME: Primary | ICD-10-CM

## 2022-09-29 DIAGNOSIS — I10 PRIMARY HYPERTENSION: ICD-10-CM

## 2022-09-29 DIAGNOSIS — Z23 NEEDS FLU SHOT: ICD-10-CM

## 2022-09-29 PROCEDURE — 99213 OFFICE O/P EST LOW 20 MIN: CPT | Performed by: FAMILY MEDICINE

## 2022-09-29 PROCEDURE — 90686 IIV4 VACC NO PRSV 0.5 ML IM: CPT | Performed by: FAMILY MEDICINE

## 2022-09-29 RX ORDER — CYCLOBENZAPRINE HCL 5 MG
5 TABLET ORAL
Qty: 30 TABLET | Refills: 0 | Status: SHIPPED | OUTPATIENT
Start: 2022-09-29

## 2022-09-29 RX ORDER — AZELASTINE 1 MG/ML
SPRAY, METERED NASAL
COMMUNITY
Start: 2022-09-09

## 2022-09-29 RX ORDER — PROPRANOLOL HYDROCHLORIDE 40 MG/1
40 TABLET ORAL 2 TIMES DAILY
Qty: 60 TABLET | Refills: 1 | Status: SHIPPED | OUTPATIENT
Start: 2022-09-29 | End: 2022-10-17

## 2022-09-29 NOTE — PATIENT INSTRUCTIONS
Vaccine Information Statement    Influenza (Flu) Vaccine (Inactivated or Recombinant): What You Need to Know    Many vaccine information statements are available in Pashto and other languages. See www.immunize.org/vis. Hojas de información sobre vacunas están disponibles en español y en muchos otros idiomas. Visite www.immunize.org/vis. 1. Why get vaccinated? Influenza vaccine can prevent influenza (flu). Flu is a contagious disease that spreads around the [facility] every year, usually between October and May. Anyone can get the flu, but it is more dangerous for some people. Infants and young children, people 72 years and older, pregnant people, and people with certain health conditions or a weakened immune system are at greatest risk of flu complications. Pneumonia, bronchitis, sinus infections, and ear infections are examples of flu-related complications. If you have a medical condition, such as heart disease, cancer, or diabetes, flu can make it worse. Flu can cause fever and chills, sore throat, muscle aches, fatigue, cough, headache, and runny or stuffy nose. Some people may have vomiting and diarrhea, though this is more common in children than adults. In an average year, thousands of people in the [facility] die from flu, and many more are hospitalized. Flu vaccine prevents millions of illnesses and flu-related visits to the doctor each year. 2. Influenza vaccines     CDC recommends everyone 6 months and older get vaccinated every flu season. Children 6 months through 6years of age may need 2 doses during a single flu season. Everyone else needs only 1 dose each flu season. It takes about 2 weeks for protection to develop after vaccination. There are many flu viruses, and they are always changing. Each year a new flu vaccine is made to protect against the influenza viruses believed to be likely to cause disease in the upcoming flu season.  Even when the vaccine doesnt exactly match these viruses, it may still provide some protection. Influenza vaccine does not cause flu. Influenza vaccine may be given at the same time as other vaccines. 3. Talk with your health care provider    Tell your vaccination provider if the person getting the vaccine:  Has had an allergic reaction after a previous dose of influenza vaccine, or has any severe, life-threatening allergies   Has ever had Guillain-Barré Syndrome (also called GBS)    In some cases, your health care provider may decide to postpone influenza vaccination until a future visit. Influenza vaccine can be administered at any time during pregnancy. People who are or will be pregnant during influenza season should receive inactivated influenza vaccine. People with minor illnesses, such as a cold, may be vaccinated. People who are moderately or severely ill should usually wait until they recover before getting influenza vaccine. Your health care provider can give you more information. 4. Risks of a vaccine reaction    Soreness, redness, and swelling where the shot is given, fever, muscle aches, and headache can happen after influenza vaccination. There may be a very small increased risk of Guillain-Barré Syndrome (GBS) after inactivated influenza vaccine (the flu shot). [place] children who get the flu shot along with pneumococcal vaccine (PCV13) and/or DTaP vaccine at the same time might be slightly more likely to have a seizure caused by fever. Tell your health care provider if a child who is getting flu vaccine has ever had a seizure. People sometimes faint after medical procedures, including vaccination. Tell your provider if you feel dizzy or have vision changes or ringing in the ears. As with any medicine, there is a very remote chance of a vaccine causing a severe allergic reaction, other serious injury, or death. 5. What if there is a serious problem?     An allergic reaction could occur after the vaccinated person leaves the clinic. If you see signs of a severe allergic reaction (hives, swelling of the face and throat, difficulty breathing, a fast heartbeat, dizziness, or weakness), call 9-1-1 and get the person to the nearest hospital.    For other signs that concern you, call your health care provider. Adverse reactions should be reported to the Vaccine Adverse Event Reporting System (VAERS). Your health care provider will usually file this report, or you can do it yourself. Visit the VAERS website at www.vaers. [facility].gov or call 5-457.799.5380. VAERS is only for reporting reactions, and VAERS staff members do not give medical advice. 6. The National Vaccine Injury Compensation Program    The [facility] Vaccine Injury Compensation Program (VICP) is a federal program that was created to compensate people who may have been injured by certain vaccines. Claims regarding alleged injury or death due to vaccination have a time limit for filing, which may be as short as two years. Visit the VICP website at www.[facility].gov/vaccinecompensation or call 5-967.465.9668 to learn about the program and about filing a claim. 7. How can I learn more? Ask your health care provider. Call your local or state health department. Visit the website of the Food and Drug Administration (FDA) for vaccine package inserts and additional information at www.fda.gov/vaccines-blood-biologics/vaccines. Contact the Centers for Disease Control and Prevention (CDC): Call 4-322.450.7173 (1-800-CDC-INFO) or  Visit CDCs influenza website at www.cdc.gov/flu. Vaccine Information Statement   Inactivated Influenza Vaccine   8/6/2021  42 ESHA [id number]   Department of Health and Human Services  Centers for Disease Control and Prevention    Office Use Only

## 2022-09-29 NOTE — PROGRESS NOTES
Allie Roberts is a 62 y.o. 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 coughing X 2 days. Saw her pulm a few days ago was given another inhaler she haven't picked it up yet. Neck posterior auricula report lumps there for 1 year and progressively bigger. She would like US    Neck muscle tightness, chronic pain likely fibromyalgia  We'll consider this next time. For now she wants to try short term meds   We'll do flexril prn    HTN: BP have been high at home and here  Losartan 100mg  Start propranolol    Chronic Headaches, shoulder ache  Neuro: Dr. Supa Rhodes  also see them for vertigo and migraine - advised to f/up there  [facility]        Anxiety: forever, the past year was the worse. I can't relax. My brain keep thinking, I can't turn off. Anxiety. Worries about everything. Doesn't sleep well. Denies depression  Her children have anxiety  Never have talked to psych or therapist in the past.   Denies soledad  Denies visual or auditory hallucination or delusion  Denies SI or HI  Start Celexa                 Insomnia lays in bed up to 10hrs, but only sleep 5hrs carmel doesn't want anything for this now      Hypokalemia takes K20meq [place]     PUlm managed by specialist  Asthma  Never smoker      Wears hearing aids, tinnitis, vertigo  Sees ENT - \"they say nothing wrong\". Dr. Sheppard [place]  ? adrenal nodule  Hypokalemia/ hyperaldosteronism  Advised her to f/up there. She wants to know her potassium right now and get send for infusion if needed. Told her we're not an ED.  She can go there if need things done urgently  Potassium 20mg       Reviewed: active problem list, medication list, allergies, notes from last encounter, lab results    A comprehensive review of systems was negative except for that written in the HPI. Allergies   Allergen Reactions    Codeine Rash and Itching     Current Outpatient Medications on File Prior to Visit   Medication Sig Dispense Refill    azelastine (ASTELIN) 137 mcg (0.1 %) nasal spray SPRAY 2 (TWO) SPRAYS INTO EACH NOSTRIL TWICE DAILY      meclizine (ANTIVERT) 12.5 mg tablet Take 1 Tablet by mouth every seventy-two (72) hours as needed for Dizziness. 30 Tablet 0    citalopram (CELEXA) 10 mg tablet Take 1 Tablet by mouth daily. 30 Tablet 1    fexofenadine (ALLEGRA) 180 mg tablet Take  by mouth.      magnesium 250 mg tab Take  by mouth. ascorbic acid, vitamin C, (VITAMIN C) 1,000 mg tablet Take  by mouth. zinc sulfate (ZINC-220 PO) Take  by mouth.      losartan (COZAAR) 100 mg tablet Take 1 Tablet by mouth daily. 30 Tablet 2    potassium chloride (K-DUR, KLOR-CON M20) 20 mEq tablet Take 1 Tablet by mouth daily. 90 Tablet 3    ipratropium (ATROVENT HFA) 17 mcg/actuation inhaler Take 1 Puff by inhalation every six (6) hours as needed for Wheezing. 12.9 g 1    Ubrelvy 100 mg tablet TAKE 1 TAB AT ONSET OF MIGRAINE. REPEAT 1 TAB IN 2 HOURS IF HEADACHE REMAINS. LIMIT TO 2 DAYS/WK      fremanezumab-vfrm (AJOVY) 225 mg/1.5 mL auto-injector 1.5 mL by SubCUTAneous route every thirty (30) days. Indications: migraine prevention 1.5 mL 5    montelukast (SINGULAIR) 10 mg tablet Take 10 mg by mouth daily. fish oil-omega-3 fatty acids 340-1,000 mg capsule Take 1 Capsule by mouth daily. cholecalciferol (VITAMIN D3) (1000 Units /25 mcg) tablet Take 5,000 Units by mouth daily. triamcinolone (Nasacort) 55 mcg nasal inhaler 2 Sprays by Both Nostrils route daily. (Patient not taking: No sig reported) 1 Each 5    mometasone (Asmanex Twisthaler) 220 mcg/ actuation (120) aepb inhaler Take 2 Puffs by inhalation two (2) times a day.  (Patient not taking: Reported on 9/29/2022) 1 Each 5    fluticasone propionate (FLONASE) 50 mcg/actuation nasal spray 2 Sprays by Both Nostrils route daily. (Patient not taking: Reported on 9/29/2022)       No current facility-administered medications on file prior to visit. Patient Active Problem List   Diagnosis Code    [place] syndrome E80.4    Hepatitis A B15.9    Chronic tension headaches G44.229    Insomnia G47.00    DDD (degenerative disc disease) [id number]    HTN (hypertension) I10    Bronchiectasis ([address] J47.9    Vertigo R42    Health care maintenance Z00.00    Hypokalemia E87.6    Stress F43.9    Trapezius muscle spasm M62.838    Migraine with vertigo G43.109    Cervicalgia M54.2    Anxiety F41.9    Radiculopathy of cervical spine M54.12       Visit Vitals  BP (!) 169/99   Pulse 76   Temp 96.9 °F (36.1 °C) (Temporal)   Resp 16   Ht 5' 9\" (1.753 m)   Wt 149 lb (67.6 kg)   SpO2 97%   BMI 22.00 kg/m²     General appearance: alert, cooperative, no distress, appears stated age  Neurologic: Alert and oriented X 3, normal strength and tone, symmetric. Normal without focal findings. Cranial nerves 2-12 intact. Normal coordination and gait. Mental status: Alert, oriented, thought content appropriate, affect: stable, mood-congruent. Head: Normocephalic, without obvious abnormality, atraumatic  Eyes: conjunctivae/corneas clear. PERRL, EOM's intact. Neck: supple, symmetrical, trachea midline, no JVD  Lungs: clear to auscultation bilaterally  Heart: regular rate and rhythm, S1, S2 normal, no murmur, click, rub or gallop  Abdomen: soft, non-tender. Extremities: extremities normal, atraumatic, no cyanosis or edema      Assessment/Plans:    Diagnoses and all orders for this visit:    1. Chronic pain syndrome  -     cyclobenzaprine (FLEXERIL) 5 mg tablet; Take 1 Tablet by mouth three (3) times daily as needed for Muscle Spasm(s). 2. Posterior auricular lymphadenopathy  -     US THYROID/PARATHYROID/SOFT TISS; Future    3. Neck mass  -     US THYROID/PARATHYROID/SOFT TISS; Future    4.  Primary hypertension  -     propranoloL (INDERAL) 40 mg tablet; Take 1 Tablet by mouth two (2) times a day. 5. Needs flu shot  -     INFLUENZA, FLUARIX, FLULAVAL, FLUZONE (AGE 6 MO+), AFLURIA(AGE 3Y+) IM, PF, 0.5 ML    6. Myalgia  -     cyclobenzaprine (FLEXERIL) 5 mg tablet; Take 1 Tablet by mouth three (3) times daily as needed for Muscle Spasm(s). 7. Neck muscle spasm  -     cyclobenzaprine (FLEXERIL) 5 mg tablet; Take 1 Tablet by mouth three (3) times daily as needed for Muscle Spasm(s). Discussed plans, risk/benefits of treatments/observations. Through the use of shared decision making, above plans were agreed upon. Medication compliance advised. Patient verbalized understanding. Follow-up and Dispositions    Return in about 2 weeks (around 10/13/2022) for neck us, possible trigger point injection.            Destiny Palma MD  10/3/2022

## 2022-09-29 NOTE — PROGRESS NOTES
Chief Complaint   Patient presents with    Neck Pain    Headache       1. \"Have you been to the ER, urgent care clinic since your last visit? Hospitalized since your last visit? \" No    2. \"Have you seen or consulted any other health care providers outside of the [address] [place] [address] since your last visit? \" No     3. For patients aged 39-70: Has the patient had a colonoscopy / FIT/ Cologuard? Yes - Care Gap present. Rooming MA/LPN to request most recent results patient said GI lost results was done 6 yrs ago      If the patient is female:    4. For patients aged 41-77: Has the patient had a mammogram within the past 2 years? Yes - no Care Gap present      5. For patients aged 21-65: Has the patient had a pap smear? Yes - no Care Gap present    Order placed for Flulaval,  per Verbal Order from Dr. Haris Flores on 9/29/2022 due to need for immunization. Chalino Young is a 62 y.o. female who presents for routine immunizations. She denies any symptoms , reactions or allergies that would exclude them from being immunized today. Risks and adverse reactions were discussed and the VIS was given to them. All questions were addressed. She was observed for 15 min post injection. There were no reactions observed.     Aleksandra Lopez LPN

## 2022-10-12 ENCOUNTER — HOSPITAL ENCOUNTER (OUTPATIENT)
Dept: ULTRASOUND IMAGING | Age: 57
Discharge: HOME | End: 2022-10-12
Attending: FAMILY MEDICINE
Payer: MEDICAID

## 2022-10-12 DIAGNOSIS — R22.1 NECK MASS: ICD-10-CM

## 2022-10-12 DIAGNOSIS — R59.0 POSTERIOR AURICULAR LYMPHADENOPATHY: ICD-10-CM

## 2022-10-12 PROCEDURE — 76536 US EXAM OF HEAD AND NECK: CPT

## 2022-10-17 ENCOUNTER — OFFICE VISIT (OUTPATIENT)
Dept: FAMILY MEDICINE CLINIC | Age: 57
End: 2022-10-17
Payer: MEDICAID

## 2022-10-17 VITALS
BODY MASS INDEX: 22.28 KG/M2 | TEMPERATURE: 97.7 F | RESPIRATION RATE: 16 BRPM | SYSTOLIC BLOOD PRESSURE: 145 MMHG | HEIGHT: 69 IN | WEIGHT: 150.4 LBS | HEART RATE: 83 BPM | OXYGEN SATURATION: 96 % | DIASTOLIC BLOOD PRESSURE: 90 MMHG

## 2022-10-17 DIAGNOSIS — J40 BRONCHITIS: Primary | ICD-10-CM

## 2022-10-17 DIAGNOSIS — F41.1 GENERALIZED ANXIETY DISORDER: ICD-10-CM

## 2022-10-17 DIAGNOSIS — I10 PRIMARY HYPERTENSION: ICD-10-CM

## 2022-10-17 DIAGNOSIS — F41.9 ANXIETY: ICD-10-CM

## 2022-10-17 DIAGNOSIS — R22.1 LUMP IN NECK: ICD-10-CM

## 2022-10-17 DIAGNOSIS — I10 HYPERTENSION, UNSPECIFIED TYPE: ICD-10-CM

## 2022-10-17 PROCEDURE — 99214 OFFICE O/P EST MOD 30 MIN: CPT | Performed by: FAMILY MEDICINE

## 2022-10-17 RX ORDER — PROPRANOLOL HYDROCHLORIDE 60 MG/1
60 TABLET ORAL 2 TIMES DAILY
Qty: 180 TABLET | Refills: 1 | Status: SHIPPED | OUTPATIENT
Start: 2022-10-17

## 2022-10-17 RX ORDER — PROPRANOLOL HYDROCHLORIDE 60 MG/1
60 TABLET ORAL 2 TIMES DAILY
Qty: 60 TABLET | Refills: 2 | Status: SHIPPED | OUTPATIENT
Start: 2022-10-17 | End: 2022-10-17 | Stop reason: SDUPTHER

## 2022-10-17 RX ORDER — LOSARTAN POTASSIUM 100 MG/1
100 TABLET ORAL DAILY
Qty: 90 TABLET | Refills: 1 | Status: SHIPPED | OUTPATIENT
Start: 2022-10-17

## 2022-10-17 RX ORDER — CITALOPRAM 10 MG/1
10 TABLET ORAL DAILY
Qty: 90 TABLET | Refills: 1 | Status: SHIPPED | OUTPATIENT
Start: 2022-10-17

## 2022-10-17 RX ORDER — PREDNISONE 10 MG/1
TABLET ORAL
Qty: 21 TABLET | Refills: 0 | Status: SHIPPED | OUTPATIENT
Start: 2022-10-17

## 2022-10-17 NOTE — LETTER
10/17/2022    [address] [address]      Dear Marchia Aase:    Please find your most recent results below. Resulted Orders   US HEAD NECK SOFT TISSUE    Narrative    EXAM: US HEAD NECK SOFT TISSUE    INDICATION: 70-year-old female with Dx: Posterior auricular lymphadenopathy  [R59.0 (ICD-10-CM)]; Neck mass [R22.1 (ICD-10-CM)]. COMPARISON: None. TECHNIQUE:   Limited targeted real-time sonography of the left neck was performed with  multiple static images with particular focus on the appendix. FINDINGS:  In the region of patient's maximum symptoms along the posterior left upper neck  about the hairline, there was no definite mass lesion or drainable fluid  collection identified. Limited view of the contralateral side image for  comparison appears grossly similar with bilateral symmetry. Visualized subcutaneous tissue and surrounding muscles are grossly within normal  limits. Performing sonographer reported no probe tenderness. Impression    No mass or drainable fluid collection in the left neck.           Sincerely,      Anastasiya Yen MD

## 2022-10-17 NOTE — PROGRESS NOTES
Ruel Manning is a 62 y.o. female    [place]      has a past medical history of Asthma, Bilateral breast cysts (02/2021), Bronchiectasis, Bronchiectasis ([address] (12/2019), Bruxism, Chronic bronchitis, Headache, Hypertension, Presbyopia of both eyes (01/2019), Uterine myoma (12/2020), and Vertigo (03/2014). US neck normal, no mass or lymph id on US    Have been coughing for 3 weeks, denies fever, chills. She have inhalers, hx of COPD  Start prednisone taper dose    HTN: BP have been high at home and here  Losartan 100mg  increase propranolol to 60mg    Chronic Headaches, shoulder ache  Neuro: Dr. Tosha Berry  also see them for vertigo and migraine - advised to f/up there  Genaro Hanna    Anxiety: forever, the past year was the worse. I can't relax. My brain keep thinking, I can't turn off. Anxiety. Worries about everything. Doesn't sleep well. Denies depression  Her children have anxiety  Never have talked to psych or therapist in the past.   Denies soledad  Denies visual or auditory hallucination or delusion    Recently started Celexa 10mg  Her mood has been better, less anxious and sleep better. She doesn't want to increase dose  Denies Si or HI                Insomnia lays in bed up to 10hrs, but only sleep 5hrs carmel doesn't want anything for this now      Hypokalemia takes K20meq kuhn     PUlm managed by specialist  Asthma  Never smoker      Wears hearing aids, tinnitis, vertigo  Sees ENT - \"they say nothing wrong\". Dr. Claudette Cheese  ? adrenal nodule  Hypokalemia/ hyperaldosteronism  Advised her to f/up there. She wants to know her potassium right now and get send for infusion if needed. Told her we're not an ED. She can go there if need things done urgently  Potassium 20mg       Reviewed: active problem list, medication list, allergies, notes from last encounter, lab results    A comprehensive review of systems was negative except for that written in the HPI.       Allergies   Allergen Reactions Codeine Rash and Itching     Current Outpatient Medications on File Prior to Visit   Medication Sig Dispense Refill    azelastine (ASTELIN) 137 mcg (0.1 %) nasal spray SPRAY 2 (TWO) SPRAYS INTO EACH NOSTRIL TWICE DAILY      meclizine (ANTIVERT) 12.5 mg tablet Take 1 Tablet by mouth every seventy-two (72) hours as needed for Dizziness. 30 Tablet 0    fexofenadine (ALLEGRA) 180 mg tablet Take  by mouth.      magnesium 250 mg tab Take  by mouth. ascorbic acid, vitamin C, (VITAMIN C) 1,000 mg tablet Take  by mouth. zinc sulfate (ZINC-220 PO) Take  by mouth.      potassium chloride (K-DUR, KLOR-CON M20) 20 mEq tablet Take 1 Tablet by mouth daily. 90 Tablet 3    ipratropium (ATROVENT HFA) 17 mcg/actuation inhaler Take 1 Puff by inhalation every six (6) hours as needed for Wheezing. 12.9 g 1    Ubrelvy 100 mg tablet TAKE 1 TAB AT ONSET OF MIGRAINE. REPEAT 1 TAB IN 2 HOURS IF HEADACHE REMAINS. LIMIT TO 2 DAYS/WK      fremanezumab-vfrm (AJOVY) 225 mg/1.5 mL auto-injector 1.5 mL by SubCUTAneous route every thirty (30) days. Indications: migraine prevention 1.5 mL 5    montelukast (SINGULAIR) 10 mg tablet Take 10 mg by mouth daily. fish oil-omega-3 fatty acids 340-1,000 mg capsule Take 1 Capsule by mouth daily. cholecalciferol (VITAMIN D3) (1000 Units /25 mcg) tablet Take 5,000 Units by mouth daily. cyclobenzaprine (FLEXERIL) 5 mg tablet Take 1 Tablet by mouth three (3) times daily as needed for Muscle Spasm(s). (Patient not taking: Reported on 10/17/2022) 30 Tablet 0    mometasone (Asmanex Twisthaler) 220 mcg/ actuation (120) aepb inhaler Take 2 Puffs by inhalation two (2) times a day. (Patient not taking: No sig reported) 1 Each 5    fluticasone propionate (FLONASE) 50 mcg/actuation nasal spray 2 Sprays by Both Nostrils route daily. (Patient not taking: No sig reported)       No current facility-administered medications on file prior to visit.      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    Hepatitis A B15.9    Chronic tension headaches G44.229    Insomnia G47.00    DDD (degenerative disc disease) [id number]    HTN (hypertension) I10    Bronchiectasis ([address] J47.9    Vertigo R42    Health care maintenance Z00.00    Hypokalemia E87.6    Stress F43.9    Trapezius muscle spasm M62.838    Migraine with vertigo G43.109    Cervicalgia M54.2    Anxiety F41.9    Radiculopathy of cervical spine M54.12       Visit Vitals  BP (!) 145/90   Pulse 83   Temp 97.7 °F (36.5 °C) (Temporal)   Resp 16   Ht 5' 9\" (1.753 m)   Wt 150 lb 6.4 oz (68.2 kg)   SpO2 96%   BMI 22.21 kg/m²     General appearance: alert, cooperative, no distress, appears stated age  Neurologic: Alert and oriented X 3, normal strength and tone, symmetric. Normal without focal findings. Cranial nerves 2-12 intact. Normal coordination and gait. Mental status: Alert, oriented, thought content appropriate, affect: stable, mood-congruent. Head: Normocephalic, without obvious abnormality, atraumatic  Eyes: conjunctivae/corneas clear. PERRL, EOM's intact. Neck: supple, symmetrical, trachea midline, no JVD  Lungs: clear to auscultation bilaterally  Heart: regular rate and rhythm, S1, S2 normal, no murmur, click, rub or gallop  Abdomen: soft, non-tender. Extremities: extremities normal, atraumatic, no cyanosis or edema      Assessment/Plans:    Diagnoses and all orders for this visit:    1. Bronchitis  -     predniSONE (STERAPRED DS) 10 mg dose pack; See administration instruction per 10mg dose pack    2. Anxiety    3. Primary hypertension  -     propranoloL (INDERAL) 60 mg tablet; Take 1 Tablet by mouth two (2) times a day. 4. Lump in neck    5. Generalized anxiety disorder  -     citalopram (CELEXA) 10 mg tablet; Take 1 Tablet by mouth daily. 6. Hypertension, unspecified type  -     losartan (COZAAR) 100 mg tablet; Take 1 Tablet by mouth daily. Discussed plans, risk/benefits of treatments/observations.    Through the use of shared decision making, above plans were agreed upon. Medication compliance advised. Patient verbalized understanding. Follow-up and Dispositions    Return for f/up as needed.          Shay Cee MD  10/17/2022

## 2022-10-17 NOTE — PROGRESS NOTES
Chief Complaint   Patient presents with    Results       1. Have you been to the ER, urgent care clinic since your last visit? Hospitalized since your last visit? No    2. Have you seen or consulted any other health care providers outside of the [address] [place] [address] since your last visit? Include any pap smears or colon screening.  No

## 2022-12-13 DIAGNOSIS — R42 VERTIGO: ICD-10-CM

## 2022-12-13 DIAGNOSIS — J45.40 MODERATE PERSISTENT ASTHMA WITHOUT COMPLICATION: ICD-10-CM

## 2022-12-15 RX ORDER — IPRATROPIUM BROMIDE 17 UG/1
AEROSOL, METERED RESPIRATORY (INHALATION)
Qty: 12.9 EACH | Refills: 0 | Status: SHIPPED | OUTPATIENT
Start: 2022-12-15

## 2022-12-15 RX ORDER — MECLIZINE HCL 12.5 MG 12.5 MG/1
12.5 TABLET ORAL
Qty: 10 TABLET | Refills: 0 | Status: SHIPPED | OUTPATIENT
Start: 2022-12-15

## 2023-12-29 ENCOUNTER — HOSPITAL ENCOUNTER (OUTPATIENT)
Facility: HOSPITAL | Age: 58
Discharge: HOME | End: 2023-12-29
Attending: INTERNAL MEDICINE

## 2023-12-29 ENCOUNTER — TRANSCRIBE ORDERS (OUTPATIENT)
Facility: HOSPITAL | Age: 58
End: 2023-12-29

## 2023-12-29 DIAGNOSIS — E66.3 OVERWEIGHT (BMI 25.0-29.9): Primary | ICD-10-CM

## 2023-12-29 DIAGNOSIS — E66.3 OVERWEIGHT (BMI 25.0-29.9): ICD-10-CM

## 2023-12-29 PROCEDURE — 71046 X-RAY EXAM CHEST 2 VIEWS: CPT

## 2024-01-08 ENCOUNTER — TELEPHONE (OUTPATIENT)
Age: 59
End: 2024-01-08

## 2024-01-08 NOTE — TELEPHONE ENCOUNTER
Spoke with patient's daughter. Patient needs to schedule appt to be seen and leave a list of what meds she wants refilled.

## 2024-01-08 NOTE — TELEPHONE ENCOUNTER
Patient was on the schedule to be seen today    I spoke to patient,   she states she was out of the country and could not get home because of the war.  She is at here in the states now,  and is going to run out of her medications.   She would like to speak to the nurse and give her the names of the medications that she needs    Her phone is breaking up a lot    Please call daughter's phone [phone]

## 2024-01-11 ENCOUNTER — OFFICE VISIT (OUTPATIENT)
Age: 59
End: 2024-01-11

## 2024-01-11 VITALS
OXYGEN SATURATION: 94 % | RESPIRATION RATE: 16 BRPM | BODY MASS INDEX: 25.39 KG/M2 | TEMPERATURE: 98.9 F | HEIGHT: 69 IN | SYSTOLIC BLOOD PRESSURE: 102 MMHG | HEART RATE: 103 BPM | WEIGHT: 171.4 LBS | DIASTOLIC BLOOD PRESSURE: 62 MMHG

## 2024-01-11 DIAGNOSIS — Z11.59 NEED FOR HEPATITIS C SCREENING TEST: ICD-10-CM

## 2024-01-11 DIAGNOSIS — Z13.1 SCREENING FOR DIABETES MELLITUS: ICD-10-CM

## 2024-01-11 DIAGNOSIS — I10 PRIMARY HYPERTENSION: ICD-10-CM

## 2024-01-11 DIAGNOSIS — Z86.59 HISTORY OF ANXIETY: ICD-10-CM

## 2024-01-11 DIAGNOSIS — Z00.00 HEALTH CARE MAINTENANCE: Primary | ICD-10-CM

## 2024-01-11 DIAGNOSIS — Z79.899 LONG-TERM USE OF HIGH-RISK MEDICATION: ICD-10-CM

## 2024-01-11 DIAGNOSIS — E87.6 HYPOKALEMIA: ICD-10-CM

## 2024-01-11 DIAGNOSIS — Z13.220 SCREENING CHOLESTEROL LEVEL: ICD-10-CM

## 2024-01-11 PROBLEM — M62.838 TRAPEZIUS MUSCLE SPASM: Status: RESOLVED | Noted: 2021-08-25 | Resolved: 2024-01-11

## 2024-01-11 PROBLEM — F43.9 STRESS: Status: RESOLVED | Noted: 2021-08-25 | Resolved: 2024-01-11

## 2024-01-11 PROCEDURE — 3078F DIAST BP <80 MM HG: CPT | Performed by: FAMILY MEDICINE

## 2024-01-11 PROCEDURE — 3074F SYST BP LT 130 MM HG: CPT | Performed by: FAMILY MEDICINE

## 2024-01-11 PROCEDURE — 99214 OFFICE O/P EST MOD 30 MIN: CPT | Performed by: FAMILY MEDICINE

## 2024-01-11 RX ORDER — AMLODIPINE BESYLATE 5 MG/1
5 TABLET ORAL DAILY
COMMUNITY
End: 2024-01-11 | Stop reason: SDUPTHER

## 2024-01-11 RX ORDER — AMLODIPINE BESYLATE 5 MG/1
5 TABLET ORAL DAILY
Qty: 90 TABLET | Refills: 0 | Status: SHIPPED | OUTPATIENT
Start: 2024-01-11

## 2024-01-11 RX ORDER — SPIRONOLACTONE 25 MG/1
25 TABLET ORAL DAILY
Qty: 90 TABLET | Refills: 0 | Status: SHIPPED | OUTPATIENT
Start: 2024-01-11

## 2024-01-11 RX ORDER — LOSARTAN POTASSIUM 100 MG/1
100 TABLET ORAL DAILY
Qty: 90 TABLET | Refills: 0 | Status: SHIPPED | OUTPATIENT
Start: 2024-01-11

## 2024-01-11 RX ORDER — ONDANSETRON 4 MG/1
4 TABLET, ORALLY DISINTEGRATING ORAL PRN
COMMUNITY
Start: 2022-01-03

## 2024-01-11 RX ORDER — SPIRONOLACTONE 25 MG/1
25 TABLET ORAL DAILY
COMMUNITY
End: 2024-01-11 | Stop reason: SDUPTHER

## 2024-01-11 RX ORDER — PROPRANOLOL HYDROCHLORIDE 80 MG/1
80 TABLET ORAL DAILY
Qty: 90 TABLET | Refills: 0 | Status: SHIPPED | OUTPATIENT
Start: 2024-01-11

## 2024-01-11 RX ORDER — PROPRANOLOL HYDROCHLORIDE 80 MG/1
80 TABLET ORAL DAILY
COMMUNITY
End: 2024-01-11 | Stop reason: SDUPTHER

## 2024-01-11 RX ORDER — POTASSIUM CHLORIDE 600 MG/1
2 CAPSULE, EXTENDED RELEASE ORAL 2 TIMES DAILY
Qty: 360 CAPSULE | Refills: 0 | Status: SHIPPED | OUTPATIENT
Start: 2024-01-11

## 2024-01-11 RX ORDER — POTASSIUM CHLORIDE 600 MG/1
2 CAPSULE, EXTENDED RELEASE ORAL 2 TIMES DAILY
COMMUNITY
End: 2024-01-11 | Stop reason: SDUPTHER

## 2024-01-11 SDOH — ECONOMIC STABILITY: HOUSING INSECURITY
IN THE LAST 12 MONTHS, WAS THERE A TIME WHEN YOU DID NOT HAVE A STEADY PLACE TO SLEEP OR SLEPT IN A SHELTER (INCLUDING NOW)?: NO

## 2024-01-11 SDOH — ECONOMIC STABILITY: FOOD INSECURITY: WITHIN THE PAST 12 MONTHS, YOU WORRIED THAT YOUR FOOD WOULD RUN OUT BEFORE YOU GOT MONEY TO BUY MORE.: NEVER TRUE

## 2024-01-11 SDOH — ECONOMIC STABILITY: INCOME INSECURITY: HOW HARD IS IT FOR YOU TO PAY FOR THE VERY BASICS LIKE FOOD, HOUSING, MEDICAL CARE, AND HEATING?: NOT HARD AT ALL

## 2024-01-11 SDOH — ECONOMIC STABILITY: FOOD INSECURITY: WITHIN THE PAST 12 MONTHS, THE FOOD YOU BOUGHT JUST DIDN'T LAST AND YOU DIDN'T HAVE MONEY TO GET MORE.: NEVER TRUE

## 2024-01-11 ASSESSMENT — PATIENT HEALTH QUESTIONNAIRE - PHQ9
2. FEELING DOWN, DEPRESSED OR HOPELESS: 0
7. TROUBLE CONCENTRATING ON THINGS, SUCH AS READING THE NEWSPAPER OR WATCHING TELEVISION: 1
SUM OF ALL RESPONSES TO PHQ QUESTIONS 1-9: 5
1. LITTLE INTEREST OR PLEASURE IN DOING THINGS: 1
5. POOR APPETITE OR OVEREATING: 0
10. IF YOU CHECKED OFF ANY PROBLEMS, HOW DIFFICULT HAVE THESE PROBLEMS MADE IT FOR YOU TO DO YOUR WORK, TAKE CARE OF THINGS AT HOME, OR GET ALONG WITH OTHER PEOPLE: 1
8. MOVING OR SPEAKING SO SLOWLY THAT OTHER PEOPLE COULD HAVE NOTICED. OR THE OPPOSITE, BEING SO FIGETY OR RESTLESS THAT YOU HAVE BEEN MOVING AROUND A LOT MORE THAN USUAL: 0
3. TROUBLE FALLING OR STAYING ASLEEP: 3
4. FEELING TIRED OR HAVING LITTLE ENERGY: 0
6. FEELING BAD ABOUT YOURSELF - OR THAT YOU ARE A FAILURE OR HAVE LET YOURSELF OR YOUR FAMILY DOWN: 0
SUM OF ALL RESPONSES TO PHQ9 QUESTIONS 1 & 2: 1
SUM OF ALL RESPONSES TO PHQ QUESTIONS 1-9: 5
9. THOUGHTS THAT YOU WOULD BE BETTER OFF DEAD, OR OF HURTING YOURSELF: 0

## 2024-01-11 ASSESSMENT — ANXIETY QUESTIONNAIRES
2. NOT BEING ABLE TO STOP OR CONTROL WORRYING: 1
5. BEING SO RESTLESS THAT IT IS HARD TO SIT STILL: 0
3. WORRYING TOO MUCH ABOUT DIFFERENT THINGS: 1
1. FEELING NERVOUS, ANXIOUS, OR ON EDGE: 0
7. FEELING AFRAID AS IF SOMETHING AWFUL MIGHT HAPPEN: 0
4. TROUBLE RELAXING: 3
IF YOU CHECKED OFF ANY PROBLEMS ON THIS QUESTIONNAIRE, HOW DIFFICULT HAVE THESE PROBLEMS MADE IT FOR YOU TO DO YOUR WORK, TAKE CARE OF THINGS AT HOME, OR GET ALONG WITH OTHER PEOPLE: NOT DIFFICULT AT ALL
GAD7 TOTAL SCORE: 5
6. BECOMING EASILY ANNOYED OR IRRITABLE: 0

## 2024-01-11 NOTE — PROGRESS NOTES
Jenni Pearce is a 58 y.o. female    I've not seen her > 18 months.   Swiss lives in [place]   She travel there for year at a time.       Assessment/Plans:    Jenni was seen today for follow-up chronic condition.    Diagnoses and all orders for this visit:    Health care maintenance    Primary hypertension  -     amLODIPine (NORVASC) 5 MG tablet; Take 1 tablet by mouth daily  -     losartan (COZAAR) 100 MG tablet; Take 1 tablet by mouth daily  -     potassium chloride 8 MEQ CPCR; Take 2 capsules by mouth in the morning and at bedtime  -     propranolol (INDERAL) 80 MG tablet; Take 1 tablet by mouth daily  -     spironolactone (ALDACTONE) 25 MG tablet; Take 1 tablet by mouth daily  -     CBC; Future  -     Comprehensive Metabolic Panel; Future  -     TSH; Future    Hypokalemia  -     potassium chloride 8 MEQ CPCR; Take 2 capsules by mouth in the morning and at bedtime    History of anxiety    Long-term use of high-risk medication  -     CBC; Future  -     Comprehensive Metabolic Panel; Future  -     TSH; Future  -     Lipid Panel; Future  -     Hemoglobin A1C; Future  -     Hepatitis C Antibody; Future    Screening cholesterol level  -     Lipid Panel; Future    Screening for diabetes mellitus  -     Hemoglobin A1C; Future    Need for hepatitis C screening test  -     Hepatitis C Antibody; Future      Discussed plans, risk/benefits of treatments/observations.   Through the use of shared decision making, above plans were agreed upon.   Medication compliance advised.  Patient verbalized understanding.     Return in about 1 month (around 2/11/2024) for labs review, chronic medical issues.      Subjective:     has a past medical history of Asthma, Bilateral breast cysts, Bruxism, History of anxiety, History of headache, Hypertension, Presbyopia of both eyes, Uterine myoma, and Vertigo.    Since our last visit   Have been overseas.     Was in the hospital for irregular heart beat.     She wants a letter for her

## 2024-02-14 ENCOUNTER — HOSPITAL ENCOUNTER (OUTPATIENT)
Facility: HOSPITAL | Age: 59
Discharge: HOME | End: 2024-02-17
Payer: COMMERCIAL

## 2024-02-14 DIAGNOSIS — R93.89 ABNORMAL CXR (CHEST X-RAY): ICD-10-CM

## 2024-02-14 PROCEDURE — 6360000004 HC RX CONTRAST MEDICATION: Performed by: PHYSICIAN ASSISTANT

## 2024-02-14 PROCEDURE — 71260 CT THORAX DX C+: CPT

## 2024-02-14 RX ADMIN — IOPAMIDOL 80 ML: 755 INJECTION, SOLUTION INTRAVENOUS at 08:07

## 2024-02-15 ENCOUNTER — OFFICE VISIT (OUTPATIENT)
Age: 59
End: 2024-02-15
Payer: COMMERCIAL

## 2024-02-15 VITALS
SYSTOLIC BLOOD PRESSURE: 103 MMHG | DIASTOLIC BLOOD PRESSURE: 68 MMHG | HEIGHT: 69 IN | HEART RATE: 80 BPM | WEIGHT: 176.4 LBS | BODY MASS INDEX: 26.13 KG/M2

## 2024-02-15 DIAGNOSIS — E27.8 ADRENAL NODULE (HCC): ICD-10-CM

## 2024-02-15 DIAGNOSIS — E26.9 HYPERALDOSTERONISM, UNSPECIFIED (HCC): Primary | ICD-10-CM

## 2024-02-15 LAB — HBA1C MFR BLD HPLC: 5.2 %

## 2024-02-15 PROCEDURE — 99214 OFFICE O/P EST MOD 30 MIN: CPT | Performed by: INTERNAL MEDICINE

## 2024-02-15 PROCEDURE — 3078F DIAST BP <80 MM HG: CPT | Performed by: INTERNAL MEDICINE

## 2024-02-15 PROCEDURE — 3074F SYST BP LT 130 MM HG: CPT | Performed by: INTERNAL MEDICINE

## 2024-02-15 RX ORDER — DEXAMETHASONE 1 MG
1 TABLET ORAL ONCE
Qty: 1 TABLET | Refills: 0 | Status: SHIPPED | OUTPATIENT
Start: 2024-02-15 | End: 2024-02-15

## 2024-02-15 NOTE — PROGRESS NOTES
Chief Complaint   Patient presents with    Follow-up     PCP and Pharmacy verified     History of Present Illness: Jenni Pearce is a 58 y.o. female here for follow up of hyperaldosteronism and an adrenal nodule.  I initially saw her in the hospital in June 2022.   Pt reported that she was previously seen by Dr. Khalil of nephrology in 2017 for hypokalemia and was diagnosed with hyperaldosteronism. (Looking through her records she had an aldosterone of 283 in June 2017 in April 2016 her Aldosterone was normal at 26.1.  She reported that Dr. Khalil sent her for bilateral adrenal vein sampling at [facility] \"but he said the results were not good and would need to be repeated\". Dr. Khalil started her on KCl. She moved to [place] to be with family and Dr. Khalil told her to follow up with a physician there. She notes that she never did, \"life distractions got in the way\".  Pt moved back to [place] \"about a year ago\". She notes that she spoke with her PCP about the potassium and was told her potassium was fine and she was taken off the potassium \"about 8 months ago.\"    In the hospital her potassium was low at 2.2 and she was started on IV potassium and was started on Spironolactone.  On 6/19/22 she was taken for an MRI and it showed a 15mm, lipid rich adrenal nodule.     In the hospital she had an Aldosterone level drawn at 1020AM on 6/18/22. She receive her first dose of Spironolactone at 1700 on 6/18/22.  The Aldosterone level came back at 13.4. She also had a random Cortisol level was 17.  Pt was discharged home on Spironolactone 50mg daily and KCl 20meq BID.    At our last visit in June 2022 her MRI showed no change and her adrenal testing did not show any evidence of elevated aldosterone or any adrenal abnormalities.    \"I was over-seas and I got stuck, because of the war. I was in the hospital for the low potassium and they increased my potassium to 2 pills BID\"    She is taking Spironolactone 25mg

## 2024-02-20 ENCOUNTER — HOSPITAL ENCOUNTER (OUTPATIENT)
Facility: HOSPITAL | Age: 59
Discharge: HOME | End: 2024-02-23
Attending: INTERNAL MEDICINE
Payer: COMMERCIAL

## 2024-02-20 DIAGNOSIS — E27.8 ADRENAL NODULE (HCC): ICD-10-CM

## 2024-02-20 PROCEDURE — 6360000004 HC RX CONTRAST MEDICATION: Performed by: INTERNAL MEDICINE

## 2024-02-20 PROCEDURE — 74183 MRI ABD W/O CNTR FLWD CNTR: CPT

## 2024-02-20 PROCEDURE — A9575 INJ GADOTERATE MEGLUMI 0.1ML: HCPCS | Performed by: INTERNAL MEDICINE

## 2024-02-20 RX ORDER — GADOTERATE MEGLUMINE 376.9 MG/ML
16 INJECTION INTRAVENOUS ONCE
Status: COMPLETED | OUTPATIENT
Start: 2024-02-20 | End: 2024-02-20

## 2024-02-20 RX ADMIN — GADOTERATE MEGLUMINE 16 ML: 376.9 INJECTION, SOLUTION INTRAVENOUS at 15:53

## 2024-02-22 ENCOUNTER — TELEMEDICINE (OUTPATIENT)
Age: 59
End: 2024-02-22
Payer: COMMERCIAL

## 2024-02-22 DIAGNOSIS — E87.6 HYPOKALEMIA: ICD-10-CM

## 2024-02-22 DIAGNOSIS — I10 PRIMARY HYPERTENSION: Primary | ICD-10-CM

## 2024-02-22 PROCEDURE — 99213 OFFICE O/P EST LOW 20 MIN: CPT | Performed by: FAMILY MEDICINE

## 2024-02-22 RX ORDER — SPIRONOLACTONE 25 MG/1
25 TABLET ORAL DAILY
Qty: 90 TABLET | Refills: 0 | Status: SHIPPED | OUTPATIENT
Start: 2024-02-22

## 2024-02-22 RX ORDER — AMLODIPINE BESYLATE 5 MG/1
5 TABLET ORAL DAILY
Qty: 90 TABLET | Refills: 0 | Status: SHIPPED | OUTPATIENT
Start: 2024-02-22

## 2024-02-22 RX ORDER — PROPRANOLOL HYDROCHLORIDE 80 MG/1
80 TABLET ORAL DAILY
Qty: 90 TABLET | Refills: 0 | Status: SHIPPED | OUTPATIENT
Start: 2024-02-22

## 2024-02-22 RX ORDER — LEVOFLOXACIN 750 MG/1
TABLET, FILM COATED ORAL
COMMUNITY
Start: 2024-02-21

## 2024-02-22 RX ORDER — LOSARTAN POTASSIUM 100 MG/1
100 TABLET ORAL DAILY
Qty: 90 TABLET | Refills: 0 | Status: SHIPPED | OUTPATIENT
Start: 2024-02-22

## 2024-02-22 NOTE — PROGRESS NOTES
Chief Complaint   Patient presents with    Follow-up Chronic Condition     Check meds    1. Have you been to the ER, urgent care clinic since your last visit?  Hospitalized since your last visit?No    2. Have you seen or consulted any other health care providers outside of the [facility] since your last visit?  Include any pap smears or colon screening. Yes      
well-developed and well-nourished [x] No apparent distress      [] Abnormal-   Mental status  [x] Alert and awake  [x] Oriented to person/place/time [x]Able to follow commands      Eyes:  EOM    [x]  Normal  [] Abnormal-  Sclera  [x]  Normal  [] Abnormal -         Discharge [x]  None visible  [] Abnormal -    HENT:   [x] Normocephalic, atraumatic.  [] Abnormal   [x] Mouth/Throat: Mucous membranes are moist.     External Ears [x] Normal  [] Abnormal-     Neck: [x] No visualized mass     Pulmonary/Chest: [x] Respiratory effort normal.  [x] No visualized signs of difficulty breathing or respiratory distress        [] Abnormal-      Musculoskeletal:   [x] Normal gait with no signs of ataxia         [x] Normal range of motion of neck        [] Abnormal-       Neurological:        [x] No Facial Asymmetry (Cranial nerve 7 motor function) (limited exam to video visit)          [x] No gaze palsy        [] Abnormal-         Skin:        [x] No significant exanthematous lesions or discoloration noted on facial skin         [] Abnormal-            Psychiatric:       [x] Normal Affect [x] No Hallucinations        [] Abnormal-     Other pertinent observable physical exam findings-     ASSESSMENT/PLAN:  Jenni was seen today for follow-up chronic condition.    Diagnoses and all orders for this visit:    Primary hypertension  -     amLODIPine (NORVASC) 5 MG tablet; Take 1 tablet by mouth daily  -     losartan (COZAAR) 100 MG tablet; Take 1 tablet by mouth daily  -     propranolol (INDERAL) 80 MG tablet; Take 1 tablet by mouth daily  -     spironolactone (ALDACTONE) 25 MG tablet; Take 1 tablet by mouth daily    Hypokalemia      Return in about 6 months (around 8/22/2024) for chronic medical issues.    Jenni Pearce, was evaluated through a synchronous (real-time) audio-video encounter. The patient (or guardian if applicable) is aware that this is a billable service, which includes applicable co-pays. This Virtual Visit was

## 2024-02-25 ENCOUNTER — HOSPITAL ENCOUNTER (EMERGENCY)
Facility: HOSPITAL | Age: 59
Discharge: HOME | End: 2024-02-26
Attending: EMERGENCY MEDICINE
Payer: COMMERCIAL

## 2024-02-25 DIAGNOSIS — R11.0 NAUSEA: ICD-10-CM

## 2024-02-25 DIAGNOSIS — R51.9 NONINTRACTABLE HEADACHE, UNSPECIFIED CHRONICITY PATTERN, UNSPECIFIED HEADACHE TYPE: Primary | ICD-10-CM

## 2024-02-25 DIAGNOSIS — H81.10 BENIGN PAROXYSMAL POSITIONAL VERTIGO, UNSPECIFIED LATERALITY: ICD-10-CM

## 2024-02-25 LAB
ALBUMIN SERPL-MCNC: 3.4 G/DL (ref 3.5–5)
ALBUMIN/GLOB SERPL: 0.7 (ref 1.1–2.2)
ALP SERPL-CCNC: 109 U/L (ref 45–117)
ALT SERPL-CCNC: 47 U/L (ref 12–78)
ANION GAP SERPL CALC-SCNC: 2 MMOL/L (ref 5–15)
AST SERPL-CCNC: 38 U/L (ref 15–37)
BASOPHILS # BLD: 0.1 K/UL (ref 0–0.1)
BASOPHILS NFR BLD: 1 % (ref 0–1)
BILIRUB SERPL-MCNC: 1.1 MG/DL (ref 0.2–1)
BUN SERPL-MCNC: 14 MG/DL (ref 6–20)
BUN/CREAT SERPL: 18 (ref 12–20)
CALCIUM SERPL-MCNC: 10 MG/DL (ref 8.5–10.1)
CHLORIDE SERPL-SCNC: 108 MMOL/L (ref 97–108)
CO2 SERPL-SCNC: 28 MMOL/L (ref 21–32)
CREAT SERPL-MCNC: 0.78 MG/DL (ref 0.55–1.02)
DIFFERENTIAL METHOD BLD: ABNORMAL
EOSINOPHIL # BLD: 0.2 K/UL (ref 0–0.4)
EOSINOPHIL NFR BLD: 2 % (ref 0–7)
ERYTHROCYTE [DISTWIDTH] IN BLOOD BY AUTOMATED COUNT: 13.2 % (ref 11.5–14.5)
GLOBULIN SER CALC-MCNC: 4.9 G/DL (ref 2–4)
GLUCOSE SERPL-MCNC: 113 MG/DL (ref 65–100)
HCT VFR BLD AUTO: 41.8 % (ref 35–47)
HGB BLD-MCNC: 13.3 G/DL (ref 11.5–16)
IMM GRANULOCYTES # BLD AUTO: 0.1 K/UL (ref 0–0.04)
IMM GRANULOCYTES NFR BLD AUTO: 1 % (ref 0–0.5)
LYMPHOCYTES # BLD: 1.7 K/UL (ref 0.8–3.5)
LYMPHOCYTES NFR BLD: 15 % (ref 12–49)
MCH RBC QN AUTO: 29.8 PG (ref 26–34)
MCHC RBC AUTO-ENTMCNC: 31.8 G/DL (ref 30–36.5)
MCV RBC AUTO: 93.7 FL (ref 80–99)
MONOCYTES # BLD: 0.8 K/UL (ref 0–1)
MONOCYTES NFR BLD: 7 % (ref 5–13)
NEUTS SEG # BLD: 8.3 K/UL (ref 1.8–8)
NEUTS SEG NFR BLD: 74 % (ref 32–75)
NRBC # BLD: 0 K/UL (ref 0–0.01)
NRBC BLD-RTO: 0 PER 100 WBC
PLATELET # BLD AUTO: 253 K/UL (ref 150–400)
PMV BLD AUTO: 10.7 FL (ref 8.9–12.9)
POTASSIUM SERPL-SCNC: 3.8 MMOL/L (ref 3.5–5.1)
PROT SERPL-MCNC: 8.3 G/DL (ref 6.4–8.2)
RBC # BLD AUTO: 4.46 M/UL (ref 3.8–5.2)
SODIUM SERPL-SCNC: 138 MMOL/L (ref 136–145)
WBC # BLD AUTO: 11.1 K/UL (ref 3.6–11)

## 2024-02-25 PROCEDURE — 80053 COMPREHEN METABOLIC PANEL: CPT

## 2024-02-25 PROCEDURE — 85025 COMPLETE CBC W/AUTO DIFF WBC: CPT

## 2024-02-25 PROCEDURE — 36415 COLL VENOUS BLD VENIPUNCTURE: CPT

## 2024-02-25 PROCEDURE — 99284 EMERGENCY DEPT VISIT MOD MDM: CPT

## 2024-02-25 RX ORDER — MECLIZINE HCL 12.5 MG/1
50 TABLET ORAL ONCE
Status: COMPLETED | OUTPATIENT
Start: 2024-02-25 | End: 2024-02-26

## 2024-02-25 RX ORDER — 0.9 % SODIUM CHLORIDE 0.9 %
1000 INTRAVENOUS SOLUTION INTRAVENOUS ONCE
Status: COMPLETED | OUTPATIENT
Start: 2024-02-25 | End: 2024-02-26

## 2024-02-25 RX ORDER — ONDANSETRON 2 MG/ML
4 INJECTION INTRAMUSCULAR; INTRAVENOUS ONCE
Status: COMPLETED | OUTPATIENT
Start: 2024-02-25 | End: 2024-02-26

## 2024-02-25 ASSESSMENT — PAIN - FUNCTIONAL ASSESSMENT: PAIN_FUNCTIONAL_ASSESSMENT: 0-10

## 2024-02-25 ASSESSMENT — PAIN SCALES - GENERAL: PAINLEVEL_OUTOF10: 8

## 2024-02-25 ASSESSMENT — PAIN DESCRIPTION - PAIN TYPE: TYPE: CHRONIC PAIN

## 2024-02-25 ASSESSMENT — PAIN DESCRIPTION - LOCATION: LOCATION: ABDOMEN;EAR

## 2024-02-26 VITALS
OXYGEN SATURATION: 96 % | BODY MASS INDEX: 26.02 KG/M2 | HEIGHT: 69 IN | DIASTOLIC BLOOD PRESSURE: 71 MMHG | SYSTOLIC BLOOD PRESSURE: 110 MMHG | TEMPERATURE: 98.4 F | HEART RATE: 80 BPM | RESPIRATION RATE: 18 BRPM | WEIGHT: 175.71 LBS

## 2024-02-26 PROCEDURE — 96361 HYDRATE IV INFUSION ADD-ON: CPT

## 2024-02-26 PROCEDURE — 6370000000 HC RX 637 (ALT 250 FOR IP): Performed by: EMERGENCY MEDICINE

## 2024-02-26 PROCEDURE — 96374 THER/PROPH/DIAG INJ IV PUSH: CPT

## 2024-02-26 PROCEDURE — 2580000003 HC RX 258: Performed by: EMERGENCY MEDICINE

## 2024-02-26 PROCEDURE — 6360000002 HC RX W HCPCS: Performed by: EMERGENCY MEDICINE

## 2024-02-26 RX ORDER — ONDANSETRON 4 MG/1
4 TABLET, ORALLY DISINTEGRATING ORAL 3 TIMES DAILY PRN
Qty: 21 TABLET | Refills: 0 | Status: SHIPPED | OUTPATIENT
Start: 2024-02-26

## 2024-02-26 RX ORDER — MECLIZINE HYDROCHLORIDE 25 MG/1
25 TABLET ORAL 3 TIMES DAILY PRN
Qty: 30 TABLET | Refills: 0 | Status: SHIPPED | OUTPATIENT
Start: 2024-02-26 | End: 2024-03-07

## 2024-02-26 RX ORDER — BUTALBITAL, ACETAMINOPHEN AND CAFFEINE 300; 40; 50 MG/1; MG/1; MG/1
1 CAPSULE ORAL EVERY 4 HOURS PRN
Qty: 20 CAPSULE | Refills: 0 | Status: SHIPPED | OUTPATIENT
Start: 2024-02-26 | End: 2024-03-11

## 2024-02-26 RX ADMIN — SODIUM CHLORIDE 1000 ML: 9 INJECTION, SOLUTION INTRAVENOUS at 00:22

## 2024-02-26 RX ADMIN — ONDANSETRON 4 MG: 2 INJECTION INTRAMUSCULAR; INTRAVENOUS at 00:27

## 2024-02-26 RX ADMIN — MECLIZINE 50 MG: 12.5 TABLET ORAL at 00:23

## 2024-02-26 NOTE — ED PROVIDER NOTES
[facility] EMERGENCY DEPT  EMERGENCY DEPARTMENT HISTORY AND PHYSICAL EXAM      Date: 2024  Patient Name: Jenni Pearce  MRN: [id number]  Birthdate 1965  Date of evaluation: 2024  Provider: Jens Taylor MD   Note Started: 11:22 PM EST 24    HISTORY OF PRESENT ILLNESS     Chief Complaint   Patient presents with    Dizziness     Patient reports history of vertigo, states she began feeling dizzy yesterday, had a few hours of relief today but then it came back    Emesis     One episode of vomiting, patient reports nausea since yesterday    Otalgia     Bilateral ear pain, patient reports this occurs when she experiences vertigo       History Provided By: Patient    HPI: Jenni Pearce is a 58 y.o. female with known past medical history significant for vertigo says she began feeling dizzy yesterday and said a few hours of relief but comes back with some associated nausea and vomiting and some loose stools.  Patient states that she is get ear pain and this occurs when she experiences vertigo.  She is being seen and followed by neurology who have described it is similar to in many years but closer to vertigo.  She denies any fever, chills, chest pain, shortness of breath, rash, diarrhea, headache, night sweats    PAST MEDICAL HISTORY   Past Medical History:  Past Medical History:   Diagnosis Date    Asthma     Bilateral breast cysts 2021    Bruxism     History of anxiety     History of headache     Hypertension     Presbyopia of both eyes 2019    Uterine myoma 2020    Vertigo 2014       Past Surgical History:  Past Surgical History:   Procedure Laterality Date    BACK SURGERY  2006     SECTION  1990    x1 w/ twins    LOBECTOMY  1996    RML       Family History:  Family History   Problem Relation Age of Onset    Glaucoma Sister     Hypertension Sister     Hypertension Mother        Social History:  Social History     Tobacco Use    Smoking status: Never    Smokeless tobacco: Never

## 2024-02-26 NOTE — ED TRIAGE NOTES
Patient wheeled into triage from EMS with complaint of dizziness, nausea, and bilateral ear pain that began yesterday. Patient reports history of vertigo, states she usually takes over the counter dramamine for symptoms, reports this did not help. Patient reports being prescribed meclazine and zofran but does not have them filled right now. Patient also reporting bilateral ear pain describes it as \"a lot of pressure\", patient reports this is common with her when she is experiencing vertigo.

## 2024-02-28 LAB
ACTH PLAS-MCNC: <1.5 PG/ML (ref 7.2–63.3)
BUN SERPL-MCNC: 11 MG/DL (ref 6–24)
BUN/CREAT SERPL: 16 (ref 9–23)
CALCIUM SERPL-MCNC: 10.3 MG/DL (ref 8.7–10.2)
CHLORIDE SERPL-SCNC: 105 MMOL/L (ref 96–106)
CO2 SERPL-SCNC: 19 MMOL/L (ref 20–29)
CORTIS AM PEAK SERPL-MCNC: 1 UG/DL (ref 6.2–19.4)
CREAT SERPL-MCNC: 0.68 MG/DL (ref 0.57–1)
DHEA-S SERPL-MCNC: 22.4 UG/DL (ref 29.4–220.5)
EGFRCR SERPLBLD CKD-EPI 2021: 101 ML/MIN/1.73
GLUCOSE SERPL-MCNC: 133 MG/DL (ref 70–99)
POTASSIUM SERPL-SCNC: 4.4 MMOL/L (ref 3.5–5.2)
SODIUM SERPL-SCNC: 141 MMOL/L (ref 134–144)
TSH SERPL DL<=0.005 MIU/L-ACNC: 1.28 UIU/ML (ref 0.45–4.5)

## 2024-03-01 ENCOUNTER — TELEPHONE (OUTPATIENT)
Age: 59
End: 2024-03-01

## 2024-03-01 LAB — ALDOST SERPL-MCNC: 21.9 NG/DL (ref 0–30)

## 2024-03-01 NOTE — TELEPHONE ENCOUNTER
Patient called,  she went to ER this past Sunday      She was having headaches, vertigo, nausea  & vomiting, pressure in neck and head   pressure in ear, numbness in left hand      She continues to have these issues,      The pressure in neck and head is worse when she walks    The numbness in left hand comes and goes       She would like an appointment,  please call patient to advise      She has been having these issues for 10 years   she saw an ENT for this and he says this is not his  issue   referred her to a neurologist Dr Shukla  he only works in the hospital now     She has a new appointment with neurologist  5/23/24    [phone]

## 2024-03-01 NOTE — TELEPHONE ENCOUNTER
Patient requesting a call to discuss her current medical condition.    She stated went to the ER on 2/25/24

## 2024-03-01 NOTE — TELEPHONE ENCOUNTER
Spoke with patient. Not any better since went to ER. Has appt with Neuro in May. Instructed her to go to ER if getting worse.

## 2024-03-02 LAB
METANEPH FREE SERPL-MCNC: <25 PG/ML (ref 0–88)
NORMETANEPHRINE SERPL-MCNC: 35.7 PG/ML (ref 0–244)

## 2024-03-04 LAB
DEXAMETHASONE SERPL-MCNC: 383 NG/DL
RENIN PLAS-CCNC: 4.86 NG/ML/HR (ref 0.17–5.38)

## 2024-03-06 ENCOUNTER — TELEPHONE (OUTPATIENT)
Age: 59
End: 2024-03-06

## 2024-03-06 NOTE — TELEPHONE ENCOUNTER
Pt request mammogram order     [facility]     Requested through [employer]

## 2024-03-06 NOTE — TELEPHONE ENCOUNTER
SPOKE WITH PATIENT, SHE WAS RECENTLY SEEN IN THE ER AND WOULD LIKE A SOONER APPT WITH YOU, SHE C/O VERTIGO, PRESSURE BEHIND THE EYES, PRESSURE IN HER NECK, SHE STATES SHE HAS BEEN TO ENT AND THEY STATE IT IS NOT AN ENT ISSUE.     SHE ALSO WANTED YOU TO KNOW SHE SHE HAS A RIGHT KIDNEY NODULE AND GALLBLADDER STONES. PLEASE ADVISE THANKS

## 2024-03-20 ENCOUNTER — TELEPHONE (OUTPATIENT)
Age: 59
End: 2024-03-20

## 2024-03-20 NOTE — TELEPHONE ENCOUNTER
Pt called wanted to discuss her illness and situation and dizziness.    Pt wants to know if there can be a conference call between Dr. Manuel,  Dr. Patricia Neuroligist, Dr. Mora Pulmonary, Dr. Madhuri Wilcox     Pt # [phone]

## 2024-03-20 NOTE — TELEPHONE ENCOUNTER
Spoke with patient. She is concerned that with all these specialist. She wants PCP to coordinate between them. Informed her that she can schedule appt after seeing them to f/u here.  She verbalized understanding.

## 2024-03-21 ENCOUNTER — OFFICE VISIT (OUTPATIENT)
Age: 59
End: 2024-03-21
Payer: COMMERCIAL

## 2024-03-21 VITALS
RESPIRATION RATE: 20 BRPM | DIASTOLIC BLOOD PRESSURE: 74 MMHG | OXYGEN SATURATION: 98 % | HEART RATE: 70 BPM | SYSTOLIC BLOOD PRESSURE: 126 MMHG

## 2024-03-21 DIAGNOSIS — T75.3XXD MOTION SICKNESS, SUBSEQUENT ENCOUNTER: ICD-10-CM

## 2024-03-21 DIAGNOSIS — R42 LIGHTHEADEDNESS: ICD-10-CM

## 2024-03-21 DIAGNOSIS — R42 VERTIGO: Primary | ICD-10-CM

## 2024-03-21 PROCEDURE — 3074F SYST BP LT 130 MM HG: CPT

## 2024-03-21 PROCEDURE — 99214 OFFICE O/P EST MOD 30 MIN: CPT

## 2024-03-21 PROCEDURE — 3078F DIAST BP <80 MM HG: CPT

## 2024-03-21 ASSESSMENT — ENCOUNTER SYMPTOMS
EYE PAIN: 1
NAUSEA: 1

## 2024-03-21 NOTE — PROGRESS NOTES
Jenni Pearce is a 58 y.o. female who presents with the following  Chief Complaint   Patient presents with    Follow-up     Vertigo,        HPI  Patient is here today for follow-up for vertigo.  Patient was last seen June 8, 2022 by Dr. Olvera at which time the patient was complaining having headache, vertigo, tinnitus, hearing loss and was seen by ENT at [facility] who said that she did not have an ENT issue.  They question whether or not the symptoms were from migraine and she was on Ajovy with significant reduction in the frequency of her headaches however, it never helped her vertigo so, Dr. Olvera felt that the 2 issues were not related.  Patient had a CT of the head in June 2022 that was unremarkable and a MRI of the cervical spine in February 2022 with some disc bulging, canal stenosis without cord compression.  She was seen in the emergency room February 2024 for vertigo, pressure behind her eyes and neck she was given fluids and was discharged with Fioricet, meclizine and Zofran.  Today the patient states that she has been having these problems going on for 4 years now and she has dizziness, lightheadedness all of the time, pain in her ears and neck, and pressure behind her eyes.  Things will be worse if she tries to bend over and she will feel nauseous as well.  Sometimes she will have a headache in the back of her head, but her headaches are better than they used to be.  She has had a check of her vision that was reportedly normal.  She has never been referred for vestibular therapy.    Allergies   Allergen Reactions    Codeine Itching and Rash       Current Outpatient Medications   Medication Sig Dispense Refill    ondansetron (ZOFRAN-ODT) 4 MG disintegrating tablet Take 1 tablet by mouth 3 times daily as needed for Nausea or Vomiting 21 tablet 0    butalbital-APAP-caffeine (FIORICET) -40 MG CAPS per capsule Take 1 capsule by mouth every 4 hours as needed for Headaches 20 capsule 0    amLODIPine

## 2024-03-21 NOTE — PROGRESS NOTES
Dizzy all the time   Lighheaded   Pressure in the ear- ENT says nothing wrong   Pain in the back of her neck  Has been dealing with this for about 4 to 5 years   Doesn't have a normal life  Severe motion sickness   Took meclizine and zofran   She can not bend over that is her enemy

## 2024-03-25 ENCOUNTER — TELEPHONE (OUTPATIENT)
Age: 59
End: 2024-03-25

## 2024-03-26 ENCOUNTER — TRANSCRIBE ORDERS (OUTPATIENT)
Facility: HOSPITAL | Age: 59
End: 2024-03-26

## 2024-03-26 DIAGNOSIS — Z12.31 VISIT FOR SCREENING MAMMOGRAM: Primary | ICD-10-CM

## 2024-03-29 ENCOUNTER — HOSPITAL ENCOUNTER (OUTPATIENT)
Facility: HOSPITAL | Age: 59
End: 2024-03-29
Payer: COMMERCIAL

## 2024-03-29 DIAGNOSIS — R42 LIGHTHEADEDNESS: ICD-10-CM

## 2024-03-29 DIAGNOSIS — T75.3XXD MOTION SICKNESS, SUBSEQUENT ENCOUNTER: ICD-10-CM

## 2024-03-29 DIAGNOSIS — R42 VERTIGO: ICD-10-CM

## 2024-03-29 PROCEDURE — 6360000004 HC RX CONTRAST MEDICATION

## 2024-03-29 PROCEDURE — A9579 GAD-BASE MR CONTRAST NOS,1ML: HCPCS

## 2024-03-29 PROCEDURE — 70553 MRI BRAIN STEM W/O & W/DYE: CPT

## 2024-03-29 RX ADMIN — GADOTERIDOL 17 ML: 279.3 INJECTION, SOLUTION INTRAVENOUS at 10:26

## 2024-04-01 ENCOUNTER — TELEMEDICINE (OUTPATIENT)
Age: 59
End: 2024-04-01
Payer: COMMERCIAL

## 2024-04-01 DIAGNOSIS — I10 PRIMARY HYPERTENSION: ICD-10-CM

## 2024-04-01 DIAGNOSIS — E87.6 HYPOKALEMIA: ICD-10-CM

## 2024-04-01 DIAGNOSIS — J30.1 SEASONAL ALLERGIC RHINITIS DUE TO POLLEN: ICD-10-CM

## 2024-04-01 DIAGNOSIS — R09.81 NASAL SINUS CONGESTION: Primary | ICD-10-CM

## 2024-04-01 PROCEDURE — 99213 OFFICE O/P EST LOW 20 MIN: CPT | Performed by: FAMILY MEDICINE

## 2024-04-01 RX ORDER — CETIRIZINE HYDROCHLORIDE 10 MG/1
10 TABLET ORAL DAILY
Qty: 90 TABLET | Refills: 0 | Status: SHIPPED | OUTPATIENT
Start: 2024-04-01

## 2024-04-01 RX ORDER — AMOXICILLIN AND CLAVULANATE POTASSIUM 875; 125 MG/1; MG/1
1 TABLET, FILM COATED ORAL 2 TIMES DAILY
Qty: 14 TABLET | Refills: 0 | Status: SHIPPED | OUTPATIENT
Start: 2024-04-01 | End: 2024-04-08

## 2024-04-01 NOTE — PROGRESS NOTES
2024    TELEHEALTH EVALUATION -- Audio/Visual    HPI:    Jenni Pearce (:  1965) has requested an audio/video evaluation for the following concern(s):     has a past medical history of Asthma, Bilateral breast cysts, Bruxism, History of anxiety, History of headache, Hypertension, Presbyopia of both eyes, Uterine myoma, and Vertigo.    She saw a neurologist for vertigo.   REINALDO Wade  MRI brain: Mild mucosal thickening in the right frontal sinus, left maxillary sinus and left sphenoid sinus without air-fluid level. Trace bilateral mastoid effusions. The orbital contents are within normal limits. No significant osseous or scalp lesions are identified.  IMPRESSION: 1. No significant intracranial abnormality.  She's concerned that it is a cause of her vertigo and want me to treat it.  She have a history of asthma uncomplicated for the past couple years.  She does take allergy medication, but have not started them this spring season yet.  Currently taking azelastine nasal spray  We'll start augmentin BID X7 days  We'll add zyrtec daily      Below not address at this visit.    Hx of anxiety but haven't been on meds >18 months   Says she's doing fine doesn't need med.                 Insomnia lays in bed up to 10hrs, but only sleep 5hrs reji doesn't want anything for this now    Chronic Headaches, shoulder ache - but not been a problem b/c she haven't seen her neurologist or had meds > 18 months  Neuro: Dr. Olvera  also see them for vertigo and migraine - advised to f/up there  Ojovy, Ubrelvy - not on meds > 2 years     PUlm managed by specialist  Asthma  Never smoker  CT consolidation f/up       Dr. Dhaliwal  ?adrenal nodule  Hypokalemia/ hyperaldosteronism  Advised her to f/up there.  Potassium 20mg      Wears hearing aids, tinnitis, vertigo  Sees ENT - \"they said nothing wrong\".       Review of Systems    Prior to Visit Medications    Medication Sig Taking? Authorizing Provider   amoxicillin-clavulanate

## 2024-04-01 NOTE — PROGRESS NOTES
Chief Complaint   Patient presents with    Results     Review MRI, showed sinus problem     1. Have you been to the ER, urgent care clinic since your last visit?  Hospitalized since your last visit?Yes ER    2. Have you seen or consulted any other health care providers outside of the [facility] since your last visit?  Include any pap smears or colon screening. No

## 2024-04-02 ENCOUNTER — TELEPHONE (OUTPATIENT)
Age: 59
End: 2024-04-02

## 2024-04-02 RX ORDER — POTASSIUM CHLORIDE 600 MG/1
CAPSULE, EXTENDED RELEASE ORAL
Qty: 360 CAPSULE | Refills: 1 | Status: SHIPPED | OUTPATIENT
Start: 2024-04-02

## 2024-04-02 NOTE — TELEPHONE ENCOUNTER
Last appointment: 4/1/24  Next appointment: 8/26/24  Previous refill encounter(s): 1/11/24 #360    Requested Prescriptions     Pending Prescriptions Disp Refills    potassium chloride 8 MEQ CPCR [Pharmacy Med Name: POTASSIUM CL ER 8 MEQ CAPSULE] 360 capsule 1     Sig: TAKE 2 CAPSULES BY MOUTH IN THE MORNING AND AT BEDTIME *NEED NEW CARD*         For Pharmacy Admin Tracking Only    Program: Medication Refill  CPA in place:    Recommendation Provided To:   Intervention Detail: New Rx: 1, reason: Patient Preference  Intervention Accepted By:   Gap Closed?:    Time Spent (min): 5

## 2024-04-02 NOTE — TELEPHONE ENCOUNTER
Patient called      she needs a referral for nephrology she has a growth in right kidney,    her previous nephrologist has retired and she will need a new physician she prefers a [place] doctor if possible    She states Dr Manuel is aware of this    Patient's phone [phone]

## 2024-04-04 NOTE — TELEPHONE ENCOUNTER
jennyfer, MD Bari Sierra Cynthia P, LPN  Caller: Unspecified (2 days ago,  2:07 PM)  NOT kidneys but adrenal gland, this is known to her, Dr. Dhaliwal is following it, he ordered the MRI abdo.    Tell her to ask Endo if referral is needed or not.    She needs to call appropriate people that manage her conditions.    Mariela Manuel MD  4/2/2024    Spoke with patient. Informed her of above.

## 2024-04-13 DIAGNOSIS — I10 PRIMARY HYPERTENSION: ICD-10-CM

## 2024-04-15 RX ORDER — AMLODIPINE BESYLATE 5 MG/1
TABLET ORAL
Qty: 90 TABLET | Refills: 1 | Status: SHIPPED | OUTPATIENT
Start: 2024-04-15

## 2024-04-15 RX ORDER — PROPRANOLOL HYDROCHLORIDE 80 MG/1
TABLET ORAL
Qty: 90 TABLET | Refills: 1 | Status: SHIPPED | OUTPATIENT
Start: 2024-04-15

## 2024-04-15 RX ORDER — SPIRONOLACTONE 25 MG/1
25 TABLET ORAL DAILY
Qty: 90 TABLET | Refills: 1 | Status: SHIPPED | OUTPATIENT
Start: 2024-04-15

## 2024-04-15 NOTE — TELEPHONE ENCOUNTER
Last appointment: 4/1/24  Next appointment: 8/26/24  Previous refill encounter(s): 2/22/24 90 d/s    Requested Prescriptions     Pending Prescriptions Disp Refills    propranolol (INDERAL) 80 MG tablet [Pharmacy Med Name: PROPRANOLOL 80 MG TABLET] 90 tablet 1     Sig: TAKE 1 TABLET BY MOUTH EVERY DAY *NEED NEW CARD, OLD INSURANCE TERMINATED 08/31/23*    spironolactone (ALDACTONE) 25 MG tablet [Pharmacy Med Name: SPIRONOLACTONE 25 MG TABLET] 90 tablet 1     Sig: TAKE 1 TABLET BY MOUTH EVERY DAY    amLODIPine (NORVASC) 5 MG tablet [Pharmacy Med Name: AMLODIPINE BESYLATE 5 MG TAB] 90 tablet 1     Sig: TAKE 1 TABLET BY MOUTH EVERY DAY *NEED NEW INSURANCE CARD*         For Pharmacy Admin Tracking Only    Program: Medication Refill  CPA in place:    Recommendation Provided To:   Intervention Detail: New Rx: 3, reason: Patient Preference  Intervention Accepted By:   Gap Closed?:    Time Spent (min): 5

## 2024-05-03 ENCOUNTER — TELEMEDICINE (OUTPATIENT)
Age: 59
End: 2024-05-03
Payer: COMMERCIAL

## 2024-05-03 DIAGNOSIS — H93.13 BILATERAL TINNITUS: ICD-10-CM

## 2024-05-03 DIAGNOSIS — E87.6 HYPOKALEMIA: ICD-10-CM

## 2024-05-03 DIAGNOSIS — J01.00 ACUTE NON-RECURRENT MAXILLARY SINUSITIS: Primary | ICD-10-CM

## 2024-05-03 DIAGNOSIS — R11.0 NAUSEA: ICD-10-CM

## 2024-05-03 DIAGNOSIS — H92.09 EARACHE: ICD-10-CM

## 2024-05-03 DIAGNOSIS — R42 VERTIGO: ICD-10-CM

## 2024-05-03 DIAGNOSIS — I10 PRIMARY HYPERTENSION: ICD-10-CM

## 2024-05-03 DIAGNOSIS — Z71.84 COUNSELING ABOUT TRAVEL: ICD-10-CM

## 2024-05-03 PROCEDURE — 99214 OFFICE O/P EST MOD 30 MIN: CPT | Performed by: FAMILY MEDICINE

## 2024-05-03 RX ORDER — ONDANSETRON 4 MG/1
4 TABLET, ORALLY DISINTEGRATING ORAL EVERY 12 HOURS PRN
Qty: 30 TABLET | Refills: 0 | Status: SHIPPED | OUTPATIENT
Start: 2024-05-03

## 2024-05-03 RX ORDER — SPIRONOLACTONE 25 MG/1
25 TABLET ORAL DAILY
Qty: 90 TABLET | Refills: 1
Start: 2024-05-03

## 2024-05-03 RX ORDER — MECLIZINE HYDROCHLORIDE 25 MG/1
25 TABLET ORAL 2 TIMES DAILY PRN
Qty: 60 TABLET | Refills: 0 | Status: SHIPPED | OUTPATIENT
Start: 2024-05-03 | End: 2024-06-02

## 2024-05-03 RX ORDER — POTASSIUM CHLORIDE 600 MG/1
CAPSULE, EXTENDED RELEASE ORAL
Qty: 360 CAPSULE | Refills: 1
Start: 2024-05-03

## 2024-05-03 RX ORDER — AMOXICILLIN AND CLAVULANATE POTASSIUM 875; 125 MG/1; MG/1
1 TABLET, FILM COATED ORAL 2 TIMES DAILY
Qty: 14 TABLET | Refills: 0 | Status: SHIPPED | OUTPATIENT
Start: 2024-05-03 | End: 2024-05-10

## 2024-05-03 RX ORDER — PROPRANOLOL HYDROCHLORIDE 80 MG/1
TABLET ORAL
Qty: 90 TABLET | Refills: 1
Start: 2024-05-03

## 2024-05-03 RX ORDER — LOSARTAN POTASSIUM 100 MG/1
100 TABLET ORAL DAILY
Qty: 90 TABLET | Refills: 0 | Status: SHIPPED | OUTPATIENT
Start: 2024-05-03

## 2024-05-03 RX ORDER — AMLODIPINE BESYLATE 5 MG/1
TABLET ORAL
Qty: 90 TABLET | Refills: 1
Start: 2024-05-03

## 2024-05-03 NOTE — PROGRESS NOTES
Chief Complaint   Patient presents with    Nausea    Otalgia     Both ears     Both ears hurting. Dizzy over last week.     1. Have you been to the ER, urgent care clinic since your last visit?  Hospitalized since your last visit?No    2. Have you seen or consulted any other health care providers outside of the [facility] since your last visit?  Include any pap smears or colon screening. No    
[facility] [id number]  Provider was located at Home [place]Appt [place]  Confirm you are appropriately licensed, registered, or certified to deliver care in the state where the patient is located as indicated above. If you are not or unsure, please re-schedule the visit: Yes, I confirm.       Total time spent on this encounter:  >20 minutes    --Mariela Manuel MD on 5/6/2024 at 10:23 AM    An electronic signature was used to authenticate this note.

## 2024-05-07 ENCOUNTER — TELEPHONE (OUTPATIENT)
Age: 59
End: 2024-05-07

## 2024-05-07 NOTE — TELEPHONE ENCOUNTER
Patient called stating that she is going out of the country. Would like a letter in case she needs medical attention while out of the country explaining the condition that she has  and which medications she is taking for that condition and any medical advice that she may need to treat her condition while out of the country.   She will  the letter next week.

## 2024-05-10 DIAGNOSIS — R42 VERTIGO: ICD-10-CM

## 2024-05-10 DIAGNOSIS — R11.0 NAUSEA: ICD-10-CM

## 2024-05-10 RX ORDER — ONDANSETRON 4 MG/1
4 TABLET, ORALLY DISINTEGRATING ORAL EVERY 12 HOURS PRN
Qty: 30 TABLET | Refills: 0 | OUTPATIENT
Start: 2024-05-10

## 2024-05-10 RX ORDER — MECLIZINE HYDROCHLORIDE 25 MG/1
25 TABLET ORAL 2 TIMES DAILY PRN
Qty: 60 TABLET | Refills: 0 | OUTPATIENT
Start: 2024-05-10 | End: 2024-06-09

## 2024-05-10 NOTE — TELEPHONE ENCOUNTER
Rx's approved and sent on 5/3/24    For Pharmacy Admin Tracking Only    Program: Medication Refill  CPA in place:    Recommendation Provided To:   Intervention Detail: Discontinued Rx: 2, reason: Duplicate Therapy  Intervention Accepted By:   Gap Closed?:    Time Spent (min): 5

## 2024-05-19 ENCOUNTER — ANESTHESIA (OUTPATIENT)
Facility: HOSPITAL | Age: 59
End: 2024-05-19
Payer: COMMERCIAL

## 2024-05-19 ENCOUNTER — ANESTHESIA EVENT (OUTPATIENT)
Facility: HOSPITAL | Age: 59
End: 2024-05-19
Payer: COMMERCIAL

## 2024-05-19 ENCOUNTER — HOSPITAL ENCOUNTER (OUTPATIENT)
Facility: HOSPITAL | Age: 59
Setting detail: OBSERVATION
Discharge: HOME | End: 2024-05-20
Attending: STUDENT IN AN ORGANIZED HEALTH CARE EDUCATION/TRAINING PROGRAM | Admitting: STUDENT IN AN ORGANIZED HEALTH CARE EDUCATION/TRAINING PROGRAM
Payer: COMMERCIAL

## 2024-05-19 ENCOUNTER — APPOINTMENT (OUTPATIENT)
Facility: HOSPITAL | Age: 59
End: 2024-05-19
Payer: COMMERCIAL

## 2024-05-19 DIAGNOSIS — K81.0 ACUTE CHOLECYSTITIS: Primary | ICD-10-CM

## 2024-05-19 DIAGNOSIS — M54.12 RADICULOPATHY OF CERVICAL SPINE: ICD-10-CM

## 2024-05-19 LAB
ALBUMIN SERPL-MCNC: 3.5 G/DL (ref 3.5–5)
ALBUMIN/GLOB SERPL: 0.8 (ref 1.1–2.2)
ALP SERPL-CCNC: 99 U/L (ref 45–117)
ALT SERPL-CCNC: 48 U/L (ref 12–78)
ANION GAP SERPL CALC-SCNC: 4 MMOL/L (ref 5–15)
AST SERPL-CCNC: 31 U/L (ref 15–37)
BASOPHILS # BLD: 0.1 K/UL (ref 0–0.1)
BASOPHILS NFR BLD: 1 % (ref 0–1)
BILIRUB SERPL-MCNC: 1.2 MG/DL (ref 0.2–1)
BUN SERPL-MCNC: 13 MG/DL (ref 6–20)
BUN/CREAT SERPL: 18 (ref 12–20)
CALCIUM SERPL-MCNC: 10.4 MG/DL (ref 8.5–10.1)
CHLORIDE SERPL-SCNC: 110 MMOL/L (ref 97–108)
CO2 SERPL-SCNC: 25 MMOL/L (ref 21–32)
CREAT SERPL-MCNC: 0.74 MG/DL (ref 0.55–1.02)
DIFFERENTIAL METHOD BLD: NORMAL
EKG ATRIAL RATE: 70 BPM
EKG DIAGNOSIS: NORMAL
EKG P AXIS: 55 DEGREES
EKG P-R INTERVAL: 144 MS
EKG Q-T INTERVAL: 384 MS
EKG QRS DURATION: 84 MS
EKG QTC CALCULATION (BAZETT): 414 MS
EKG R AXIS: 42 DEGREES
EKG T AXIS: 37 DEGREES
EKG VENTRICULAR RATE: 70 BPM
EOSINOPHIL # BLD: 0.2 K/UL (ref 0–0.4)
EOSINOPHIL NFR BLD: 2 % (ref 0–7)
ERYTHROCYTE [DISTWIDTH] IN BLOOD BY AUTOMATED COUNT: 13 % (ref 11.5–14.5)
GLOBULIN SER CALC-MCNC: 4.5 G/DL (ref 2–4)
GLUCOSE SERPL-MCNC: 109 MG/DL (ref 65–100)
HCT VFR BLD AUTO: 40.8 % (ref 35–47)
HGB BLD-MCNC: 13.6 G/DL (ref 11.5–16)
IMM GRANULOCYTES # BLD AUTO: 0 K/UL (ref 0–0.04)
IMM GRANULOCYTES NFR BLD AUTO: 0 % (ref 0–0.5)
LIPASE SERPL-CCNC: 59 U/L (ref 13–75)
LYMPHOCYTES # BLD: 1.9 K/UL (ref 0.8–3.5)
LYMPHOCYTES NFR BLD: 25 % (ref 12–49)
MCH RBC QN AUTO: 29.6 PG (ref 26–34)
MCHC RBC AUTO-ENTMCNC: 33.3 G/DL (ref 30–36.5)
MCV RBC AUTO: 88.9 FL (ref 80–99)
MONOCYTES # BLD: 0.6 K/UL (ref 0–1)
MONOCYTES NFR BLD: 8 % (ref 5–13)
NEUTS SEG # BLD: 5.1 K/UL (ref 1.8–8)
NEUTS SEG NFR BLD: 64 % (ref 32–75)
NRBC # BLD: 0 K/UL (ref 0–0.01)
NRBC BLD-RTO: 0 PER 100 WBC
PLATELET # BLD AUTO: 213 K/UL (ref 150–400)
PMV BLD AUTO: 9.9 FL (ref 8.9–12.9)
POTASSIUM SERPL-SCNC: 3.7 MMOL/L (ref 3.5–5.1)
PROT SERPL-MCNC: 8 G/DL (ref 6.4–8.2)
RBC # BLD AUTO: 4.59 M/UL (ref 3.8–5.2)
SODIUM SERPL-SCNC: 139 MMOL/L (ref 136–145)
TROPONIN I SERPL HS-MCNC: 7 NG/L (ref 0–51)
WBC # BLD AUTO: 7.9 K/UL (ref 3.6–11)

## 2024-05-19 PROCEDURE — 96361 HYDRATE IV INFUSION ADD-ON: CPT

## 2024-05-19 PROCEDURE — 3600000004 HC SURGERY LEVEL 4 BASE: Performed by: STUDENT IN AN ORGANIZED HEALTH CARE EDUCATION/TRAINING PROGRAM

## 2024-05-19 PROCEDURE — 6370000000 HC RX 637 (ALT 250 FOR IP): Performed by: NURSE ANESTHETIST, CERTIFIED REGISTERED

## 2024-05-19 PROCEDURE — 3700000000 HC ANESTHESIA ATTENDED CARE: Performed by: STUDENT IN AN ORGANIZED HEALTH CARE EDUCATION/TRAINING PROGRAM

## 2024-05-19 PROCEDURE — 7100000000 HC PACU RECOVERY - FIRST 15 MIN: Performed by: STUDENT IN AN ORGANIZED HEALTH CARE EDUCATION/TRAINING PROGRAM

## 2024-05-19 PROCEDURE — 88304 TISSUE EXAM BY PATHOLOGIST: CPT

## 2024-05-19 PROCEDURE — 96374 THER/PROPH/DIAG INJ IV PUSH: CPT

## 2024-05-19 PROCEDURE — 85025 COMPLETE CBC W/AUTO DIFF WBC: CPT

## 2024-05-19 PROCEDURE — 3600000014 HC SURGERY LEVEL 4 ADDTL 15MIN: Performed by: STUDENT IN AN ORGANIZED HEALTH CARE EDUCATION/TRAINING PROGRAM

## 2024-05-19 PROCEDURE — 2580000003 HC RX 258: Performed by: STUDENT IN AN ORGANIZED HEALTH CARE EDUCATION/TRAINING PROGRAM

## 2024-05-19 PROCEDURE — C1758 CATHETER, URETERAL: HCPCS | Performed by: STUDENT IN AN ORGANIZED HEALTH CARE EDUCATION/TRAINING PROGRAM

## 2024-05-19 PROCEDURE — 2720000010 HC SURG SUPPLY STERILE: Performed by: STUDENT IN AN ORGANIZED HEALTH CARE EDUCATION/TRAINING PROGRAM

## 2024-05-19 PROCEDURE — 83690 ASSAY OF LIPASE: CPT

## 2024-05-19 PROCEDURE — 6360000002 HC RX W HCPCS: Performed by: STUDENT IN AN ORGANIZED HEALTH CARE EDUCATION/TRAINING PROGRAM

## 2024-05-19 PROCEDURE — 99285 EMERGENCY DEPT VISIT HI MDM: CPT

## 2024-05-19 PROCEDURE — 2500000003 HC RX 250 WO HCPCS: Performed by: STUDENT IN AN ORGANIZED HEALTH CARE EDUCATION/TRAINING PROGRAM

## 2024-05-19 PROCEDURE — 2709999900 HC NON-CHARGEABLE SUPPLY: Performed by: STUDENT IN AN ORGANIZED HEALTH CARE EDUCATION/TRAINING PROGRAM

## 2024-05-19 PROCEDURE — 6370000000 HC RX 637 (ALT 250 FOR IP): Performed by: STUDENT IN AN ORGANIZED HEALTH CARE EDUCATION/TRAINING PROGRAM

## 2024-05-19 PROCEDURE — 2500000003 HC RX 250 WO HCPCS: Performed by: NURSE ANESTHETIST, CERTIFIED REGISTERED

## 2024-05-19 PROCEDURE — 7100000001 HC PACU RECOVERY - ADDTL 15 MIN: Performed by: STUDENT IN AN ORGANIZED HEALTH CARE EDUCATION/TRAINING PROGRAM

## 2024-05-19 PROCEDURE — 3700000001 HC ADD 15 MINUTES (ANESTHESIA): Performed by: STUDENT IN AN ORGANIZED HEALTH CARE EDUCATION/TRAINING PROGRAM

## 2024-05-19 PROCEDURE — 76705 ECHO EXAM OF ABDOMEN: CPT

## 2024-05-19 PROCEDURE — 6370000000 HC RX 637 (ALT 250 FOR IP): Performed by: ANESTHESIOLOGY

## 2024-05-19 PROCEDURE — G0378 HOSPITAL OBSERVATION PER HR: HCPCS

## 2024-05-19 PROCEDURE — 80053 COMPREHEN METABOLIC PANEL: CPT

## 2024-05-19 PROCEDURE — 6360000002 HC RX W HCPCS: Performed by: NURSE ANESTHETIST, CERTIFIED REGISTERED

## 2024-05-19 PROCEDURE — 36415 COLL VENOUS BLD VENIPUNCTURE: CPT

## 2024-05-19 PROCEDURE — 96375 TX/PRO/DX INJ NEW DRUG ADDON: CPT

## 2024-05-19 PROCEDURE — 84484 ASSAY OF TROPONIN QUANT: CPT

## 2024-05-19 PROCEDURE — 6360000004 HC RX CONTRAST MEDICATION: Performed by: STUDENT IN AN ORGANIZED HEALTH CARE EDUCATION/TRAINING PROGRAM

## 2024-05-19 PROCEDURE — 2580000003 HC RX 258: Performed by: NURSE ANESTHETIST, CERTIFIED REGISTERED

## 2024-05-19 RX ORDER — HYDROMORPHONE HYDROCHLORIDE 1 MG/ML
0.5 INJECTION, SOLUTION INTRAMUSCULAR; INTRAVENOUS; SUBCUTANEOUS EVERY 5 MIN PRN
Status: DISCONTINUED | OUTPATIENT
Start: 2024-05-19 | End: 2024-05-19 | Stop reason: HOSPADM

## 2024-05-19 RX ORDER — SODIUM CHLORIDE 0.9 % (FLUSH) 0.9 %
5-40 SYRINGE (ML) INJECTION EVERY 12 HOURS SCHEDULED
Status: DISCONTINUED | OUTPATIENT
Start: 2024-05-19 | End: 2024-05-19 | Stop reason: HOSPADM

## 2024-05-19 RX ORDER — MORPHINE SULFATE 4 MG/ML
4 INJECTION, SOLUTION INTRAMUSCULAR; INTRAVENOUS
Status: DISCONTINUED | OUTPATIENT
Start: 2024-05-19 | End: 2024-05-20 | Stop reason: HOSPADM

## 2024-05-19 RX ORDER — ONDANSETRON 2 MG/ML
INJECTION INTRAMUSCULAR; INTRAVENOUS PRN
Status: DISCONTINUED | OUTPATIENT
Start: 2024-05-19 | End: 2024-05-19 | Stop reason: SDUPTHER

## 2024-05-19 RX ORDER — ALBUTEROL SULFATE 90 UG/1
AEROSOL, METERED RESPIRATORY (INHALATION) PRN
Status: DISCONTINUED | OUTPATIENT
Start: 2024-05-19 | End: 2024-05-19 | Stop reason: SDUPTHER

## 2024-05-19 RX ORDER — FENTANYL CITRATE 50 UG/ML
25 INJECTION, SOLUTION INTRAMUSCULAR; INTRAVENOUS EVERY 5 MIN PRN
Status: DISCONTINUED | OUTPATIENT
Start: 2024-05-19 | End: 2024-05-19 | Stop reason: HOSPADM

## 2024-05-19 RX ORDER — FENTANYL CITRATE 50 UG/ML
INJECTION, SOLUTION INTRAMUSCULAR; INTRAVENOUS PRN
Status: DISCONTINUED | OUTPATIENT
Start: 2024-05-19 | End: 2024-05-19 | Stop reason: SDUPTHER

## 2024-05-19 RX ORDER — SODIUM CHLORIDE 9 MG/ML
INJECTION, SOLUTION INTRAVENOUS PRN
Status: DISCONTINUED | OUTPATIENT
Start: 2024-05-19 | End: 2024-05-19 | Stop reason: HOSPADM

## 2024-05-19 RX ORDER — ROCURONIUM BROMIDE 10 MG/ML
INJECTION, SOLUTION INTRAVENOUS PRN
Status: DISCONTINUED | OUTPATIENT
Start: 2024-05-19 | End: 2024-05-19 | Stop reason: SDUPTHER

## 2024-05-19 RX ORDER — NALOXONE HYDROCHLORIDE 0.4 MG/ML
INJECTION, SOLUTION INTRAMUSCULAR; INTRAVENOUS; SUBCUTANEOUS PRN
Status: DISCONTINUED | OUTPATIENT
Start: 2024-05-19 | End: 2024-05-19 | Stop reason: HOSPADM

## 2024-05-19 RX ORDER — SODIUM CHLORIDE, SODIUM LACTATE, POTASSIUM CHLORIDE, CALCIUM CHLORIDE 600; 310; 30; 20 MG/100ML; MG/100ML; MG/100ML; MG/100ML
INJECTION, SOLUTION INTRAVENOUS CONTINUOUS
Status: DISCONTINUED | OUTPATIENT
Start: 2024-05-19 | End: 2024-05-20 | Stop reason: HOSPADM

## 2024-05-19 RX ORDER — SODIUM CHLORIDE 0.9 % (FLUSH) 0.9 %
5-40 SYRINGE (ML) INJECTION EVERY 12 HOURS SCHEDULED
Status: DISCONTINUED | OUTPATIENT
Start: 2024-05-19 | End: 2024-05-20 | Stop reason: HOSPADM

## 2024-05-19 RX ORDER — HYDROMORPHONE HYDROCHLORIDE 2 MG/ML
INJECTION, SOLUTION INTRAMUSCULAR; INTRAVENOUS; SUBCUTANEOUS PRN
Status: DISCONTINUED | OUTPATIENT
Start: 2024-05-19 | End: 2024-05-19 | Stop reason: SDUPTHER

## 2024-05-19 RX ORDER — GLYCOPYRROLATE 0.2 MG/ML
INJECTION INTRAMUSCULAR; INTRAVENOUS PRN
Status: DISCONTINUED | OUTPATIENT
Start: 2024-05-19 | End: 2024-05-19 | Stop reason: SDUPTHER

## 2024-05-19 RX ORDER — ONDANSETRON 2 MG/ML
4 INJECTION INTRAMUSCULAR; INTRAVENOUS ONCE
Status: COMPLETED | OUTPATIENT
Start: 2024-05-19 | End: 2024-05-19

## 2024-05-19 RX ORDER — KETOROLAC TROMETHAMINE 30 MG/ML
15 INJECTION, SOLUTION INTRAMUSCULAR; INTRAVENOUS
Status: COMPLETED | OUTPATIENT
Start: 2024-05-19 | End: 2024-05-19

## 2024-05-19 RX ORDER — ONDANSETRON 2 MG/ML
4 INJECTION INTRAMUSCULAR; INTRAVENOUS EVERY 6 HOURS PRN
Status: DISCONTINUED | OUTPATIENT
Start: 2024-05-19 | End: 2024-05-20 | Stop reason: HOSPADM

## 2024-05-19 RX ORDER — POTASSIUM CHLORIDE 7.45 MG/ML
10 INJECTION INTRAVENOUS PRN
Status: DISCONTINUED | OUTPATIENT
Start: 2024-05-19 | End: 2024-05-20 | Stop reason: HOSPADM

## 2024-05-19 RX ORDER — LIDOCAINE HYDROCHLORIDE 20 MG/ML
INJECTION, SOLUTION EPIDURAL; INFILTRATION; INTRACAUDAL; PERINEURAL PRN
Status: DISCONTINUED | OUTPATIENT
Start: 2024-05-19 | End: 2024-05-19 | Stop reason: SDUPTHER

## 2024-05-19 RX ORDER — BUPIVACAINE HYDROCHLORIDE 5 MG/ML
INJECTION, SOLUTION EPIDURAL; INTRACAUDAL PRN
Status: DISCONTINUED | OUTPATIENT
Start: 2024-05-19 | End: 2024-05-19 | Stop reason: ALTCHOICE

## 2024-05-19 RX ORDER — SODIUM CHLORIDE 0.9 % (FLUSH) 0.9 %
5-40 SYRINGE (ML) INJECTION PRN
Status: DISCONTINUED | OUTPATIENT
Start: 2024-05-19 | End: 2024-05-19 | Stop reason: HOSPADM

## 2024-05-19 RX ORDER — ONDANSETRON 2 MG/ML
4 INJECTION INTRAMUSCULAR; INTRAVENOUS
Status: DISCONTINUED | OUTPATIENT
Start: 2024-05-19 | End: 2024-05-19 | Stop reason: HOSPADM

## 2024-05-19 RX ORDER — CEFAZOLIN SODIUM/WATER 2 G/20 ML
SYRINGE (ML) INTRAVENOUS PRN
Status: DISCONTINUED | OUTPATIENT
Start: 2024-05-19 | End: 2024-05-19 | Stop reason: SDUPTHER

## 2024-05-19 RX ORDER — 0.9 % SODIUM CHLORIDE 0.9 %
1000 INTRAVENOUS SOLUTION INTRAVENOUS ONCE
Status: COMPLETED | OUTPATIENT
Start: 2024-05-19 | End: 2024-05-19

## 2024-05-19 RX ORDER — MORPHINE SULFATE 4 MG/ML
2 INJECTION, SOLUTION INTRAMUSCULAR; INTRAVENOUS
Status: DISCONTINUED | OUTPATIENT
Start: 2024-05-19 | End: 2024-05-20 | Stop reason: HOSPADM

## 2024-05-19 RX ORDER — SODIUM CHLORIDE 0.9 % (FLUSH) 0.9 %
5-40 SYRINGE (ML) INJECTION PRN
Status: DISCONTINUED | OUTPATIENT
Start: 2024-05-19 | End: 2024-05-20 | Stop reason: HOSPADM

## 2024-05-19 RX ORDER — ACETAMINOPHEN 325 MG/1
650 TABLET ORAL EVERY 6 HOURS
Status: DISCONTINUED | OUTPATIENT
Start: 2024-05-19 | End: 2024-05-20 | Stop reason: HOSPADM

## 2024-05-19 RX ORDER — PROCHLORPERAZINE EDISYLATE 5 MG/ML
5 INJECTION INTRAMUSCULAR; INTRAVENOUS
Status: DISCONTINUED | OUTPATIENT
Start: 2024-05-19 | End: 2024-05-19 | Stop reason: HOSPADM

## 2024-05-19 RX ORDER — POTASSIUM CHLORIDE 20 MEQ/1
40 TABLET, EXTENDED RELEASE ORAL PRN
Status: DISCONTINUED | OUTPATIENT
Start: 2024-05-19 | End: 2024-05-20 | Stop reason: HOSPADM

## 2024-05-19 RX ORDER — SODIUM CHLORIDE 9 MG/ML
INJECTION, SOLUTION INTRAVENOUS PRN
Status: DISCONTINUED | OUTPATIENT
Start: 2024-05-19 | End: 2024-05-20 | Stop reason: HOSPADM

## 2024-05-19 RX ORDER — ONDANSETRON 4 MG/1
4 TABLET, ORALLY DISINTEGRATING ORAL EVERY 8 HOURS PRN
Status: DISCONTINUED | OUTPATIENT
Start: 2024-05-19 | End: 2024-05-20 | Stop reason: HOSPADM

## 2024-05-19 RX ORDER — SUCCINYLCHOLINE/SOD CL,ISO/PF 200MG/10ML
SYRINGE (ML) INTRAVENOUS PRN
Status: DISCONTINUED | OUTPATIENT
Start: 2024-05-19 | End: 2024-05-19 | Stop reason: SDUPTHER

## 2024-05-19 RX ORDER — SCOLOPAMINE TRANSDERMAL SYSTEM 1 MG/1
1 PATCH, EXTENDED RELEASE TRANSDERMAL
Status: DISCONTINUED | OUTPATIENT
Start: 2024-05-19 | End: 2024-05-20 | Stop reason: HOSPADM

## 2024-05-19 RX ORDER — OXYCODONE HYDROCHLORIDE 5 MG/1
5 TABLET ORAL
Status: DISCONTINUED | OUTPATIENT
Start: 2024-05-19 | End: 2024-05-19 | Stop reason: HOSPADM

## 2024-05-19 RX ORDER — SODIUM CHLORIDE, SODIUM LACTATE, POTASSIUM CHLORIDE, CALCIUM CHLORIDE 600; 310; 30; 20 MG/100ML; MG/100ML; MG/100ML; MG/100ML
INJECTION, SOLUTION INTRAVENOUS CONTINUOUS PRN
Status: DISCONTINUED | OUTPATIENT
Start: 2024-05-19 | End: 2024-05-19 | Stop reason: SDUPTHER

## 2024-05-19 RX ORDER — PROPOFOL 10 MG/ML
INJECTION, EMULSION INTRAVENOUS PRN
Status: DISCONTINUED | OUTPATIENT
Start: 2024-05-19 | End: 2024-05-19 | Stop reason: SDUPTHER

## 2024-05-19 RX ORDER — MIDAZOLAM HYDROCHLORIDE 1 MG/ML
INJECTION INTRAMUSCULAR; INTRAVENOUS PRN
Status: DISCONTINUED | OUTPATIENT
Start: 2024-05-19 | End: 2024-05-19 | Stop reason: SDUPTHER

## 2024-05-19 RX ORDER — DEXAMETHASONE SODIUM PHOSPHATE 4 MG/ML
INJECTION, SOLUTION INTRA-ARTICULAR; INTRALESIONAL; INTRAMUSCULAR; INTRAVENOUS; SOFT TISSUE PRN
Status: DISCONTINUED | OUTPATIENT
Start: 2024-05-19 | End: 2024-05-19 | Stop reason: SDUPTHER

## 2024-05-19 RX ORDER — NEOSTIGMINE METHYLSULFATE 1 MG/ML
INJECTION, SOLUTION INTRAVENOUS PRN
Status: DISCONTINUED | OUTPATIENT
Start: 2024-05-19 | End: 2024-05-19 | Stop reason: SDUPTHER

## 2024-05-19 RX ORDER — HYDROMORPHONE HYDROCHLORIDE 1 MG/ML
0.5 INJECTION, SOLUTION INTRAMUSCULAR; INTRAVENOUS; SUBCUTANEOUS ONCE
Status: COMPLETED | OUTPATIENT
Start: 2024-05-19 | End: 2024-05-19

## 2024-05-19 RX ORDER — PHENYLEPHRINE HCL IN 0.9% NACL 0.4MG/10ML
SYRINGE (ML) INTRAVENOUS PRN
Status: DISCONTINUED | OUTPATIENT
Start: 2024-05-19 | End: 2024-05-19 | Stop reason: SDUPTHER

## 2024-05-19 RX ORDER — MAGNESIUM SULFATE IN WATER 40 MG/ML
2000 INJECTION, SOLUTION INTRAVENOUS PRN
Status: DISCONTINUED | OUTPATIENT
Start: 2024-05-19 | End: 2024-05-20 | Stop reason: HOSPADM

## 2024-05-19 RX ADMIN — ACETAMINOPHEN 650 MG: 325 TABLET ORAL at 16:43

## 2024-05-19 RX ADMIN — PROPOFOL 150 MG: 10 INJECTION, EMULSION INTRAVENOUS at 12:44

## 2024-05-19 RX ADMIN — SODIUM CHLORIDE, PRESERVATIVE FREE 10 ML: 5 INJECTION INTRAVENOUS at 21:07

## 2024-05-19 RX ADMIN — HYDROMORPHONE HYDROCHLORIDE 0.5 MG: 2 INJECTION INTRAMUSCULAR; INTRAVENOUS; SUBCUTANEOUS at 12:51

## 2024-05-19 RX ADMIN — SODIUM CHLORIDE 1000 ML: 9 INJECTION, SOLUTION INTRAVENOUS at 08:36

## 2024-05-19 RX ADMIN — ROCURONIUM BROMIDE 20 MG: 10 INJECTION INTRAVENOUS at 12:51

## 2024-05-19 RX ADMIN — Medication 140 MG: at 12:44

## 2024-05-19 RX ADMIN — MIDAZOLAM HYDROCHLORIDE 2 MG: 1 INJECTION, SOLUTION INTRAMUSCULAR; INTRAVENOUS at 12:35

## 2024-05-19 RX ADMIN — ROCURONIUM BROMIDE 10 MG: 10 INJECTION INTRAVENOUS at 12:44

## 2024-05-19 RX ADMIN — PROPOFOL 150 MCG/KG/MIN: 10 INJECTION, EMULSION INTRAVENOUS at 12:45

## 2024-05-19 RX ADMIN — ALBUTEROL SULFATE 3 PUFF: 90 AEROSOL, METERED RESPIRATORY (INHALATION) at 13:56

## 2024-05-19 RX ADMIN — ONDANSETRON 4 MG: 2 INJECTION INTRAMUSCULAR; INTRAVENOUS at 13:53

## 2024-05-19 RX ADMIN — Medication 80 MCG: at 12:58

## 2024-05-19 RX ADMIN — ROCURONIUM BROMIDE 20 MG: 10 INJECTION INTRAVENOUS at 13:07

## 2024-05-19 RX ADMIN — METRONIDAZOLE 500 MG: 500 INJECTION, SOLUTION INTRAVENOUS at 13:00

## 2024-05-19 RX ADMIN — Medication 2000 MG: at 12:48

## 2024-05-19 RX ADMIN — DEXAMETHASONE SODIUM PHOSPHATE 8 MG: 4 INJECTION INTRA-ARTICULAR; INTRALESIONAL; INTRAMUSCULAR; INTRAVENOUS; SOFT TISSUE at 12:48

## 2024-05-19 RX ADMIN — ONDANSETRON 4 MG: 2 INJECTION INTRAMUSCULAR; INTRAVENOUS at 08:35

## 2024-05-19 RX ADMIN — KETOROLAC TROMETHAMINE 15 MG: 30 INJECTION, SOLUTION INTRAMUSCULAR at 08:36

## 2024-05-19 RX ADMIN — HYDROMORPHONE HYDROCHLORIDE 0.5 MG: 2 INJECTION INTRAMUSCULAR; INTRAVENOUS; SUBCUTANEOUS at 13:53

## 2024-05-19 RX ADMIN — SODIUM CHLORIDE, POTASSIUM CHLORIDE, SODIUM LACTATE AND CALCIUM CHLORIDE: 600; 310; 30; 20 INJECTION, SOLUTION INTRAVENOUS at 12:35

## 2024-05-19 RX ADMIN — SODIUM CHLORIDE, POTASSIUM CHLORIDE, SODIUM LACTATE AND CALCIUM CHLORIDE: 600; 310; 30; 20 INJECTION, SOLUTION INTRAVENOUS at 16:45

## 2024-05-19 RX ADMIN — LIDOCAINE HYDROCHLORIDE 100 MG: 20 INJECTION, SOLUTION EPIDURAL; INFILTRATION; INTRACAUDAL; PERINEURAL at 12:40

## 2024-05-19 RX ADMIN — FENTANYL CITRATE 50 MCG: 50 INJECTION, SOLUTION INTRAMUSCULAR; INTRAVENOUS at 12:40

## 2024-05-19 RX ADMIN — GLYCOPYRROLATE 0.4 MG: 0.2 INJECTION INTRAMUSCULAR; INTRAVENOUS at 13:53

## 2024-05-19 RX ADMIN — ACETAMINOPHEN 650 MG: 325 TABLET ORAL at 23:54

## 2024-05-19 RX ADMIN — Medication 80 MCG: at 12:55

## 2024-05-19 RX ADMIN — Medication 4 MG: at 13:53

## 2024-05-19 RX ADMIN — HYDROMORPHONE HYDROCHLORIDE 0.5 MG: 1 INJECTION, SOLUTION INTRAMUSCULAR; INTRAVENOUS; SUBCUTANEOUS at 10:24

## 2024-05-19 RX ADMIN — SODIUM CHLORIDE, POTASSIUM CHLORIDE, SODIUM LACTATE AND CALCIUM CHLORIDE: 600; 310; 30; 20 INJECTION, SOLUTION INTRAVENOUS at 14:15

## 2024-05-19 ASSESSMENT — PAIN SCALES - GENERAL
PAINLEVEL_OUTOF10: 6
PAINLEVEL_OUTOF10: 6
PAINLEVEL_OUTOF10: 9
PAINLEVEL_OUTOF10: 1
PAINLEVEL_OUTOF10: 5

## 2024-05-19 ASSESSMENT — PAIN DESCRIPTION - PAIN TYPE: TYPE: SURGICAL PAIN

## 2024-05-19 ASSESSMENT — PAIN DESCRIPTION - LOCATION
LOCATION: ABDOMEN
LOCATION: ABDOMEN

## 2024-05-19 ASSESSMENT — PAIN DESCRIPTION - DESCRIPTORS
DESCRIPTORS: SORE
DESCRIPTORS: OTHER (COMMENT)

## 2024-05-19 ASSESSMENT — PAIN DESCRIPTION - ORIENTATION: ORIENTATION: ANTERIOR

## 2024-05-19 ASSESSMENT — PAIN - FUNCTIONAL ASSESSMENT: PAIN_FUNCTIONAL_ASSESSMENT: ACTIVITIES ARE NOT PREVENTED

## 2024-05-19 NOTE — OP NOTE
Operative Note      Patient: Jenni Pearce  YOB: 1965  MRN: [id number]    Date of Procedure: 5/19/2024    Pre-Op Diagnosis Codes:     * Acute cholecystitis [K81.0]    Post-Op Diagnosis: Same       Procedure(s):  LAPAROSCOPIC CHOLECYSTECTOMY WITH GRAMS    Surgeon(s):  Shala Last MD    Assistant:   Surgical Assistant: Casey Choi    Anesthesia: General    Estimated Blood Loss (mL): Minimal    Complications: None    Specimens:   ID Type Source Tests Collected by Time Destination   1 : GALLBLADDER Tissue Gallbladder SURGICAL PATHOLOGY Shala Last MD 5/19/2024 1309        Implants:  * No implants in log *      Drains:   NG/OG/NJ/NE Tube Orogastric 18 fr Center mouth (Active)       Findings:  Infection Present At Time Of Surgery (PATOS) (choose all levels that have infection present):  No infection present  Other Findings: Omental adhesions to anterior abdominal wall    Detailed Description of Procedure:   The patient was brought back to the operating room and placed in the supine position.  General endotracheal anesthesia was induced.   The abdomen was prepped and draped in the usual sterile fashion.  After timeout, a 5 mm supraumbilical incision was made.  Dissection was carried down to the fascia, which was elevated with a skin hook.  The peritoneum was accessed with a Veress needle and insufflated to 15 mmHg using CO2.  A 5 mm port was placed and inspection of the entry site was pristine without any evidence of injury.  Attention was turned to the right upper quadrant.  She had several dense omental adhesions to the anterior abdominal wall that were taken down with a LigaSure [place] to expose the right side of the abdomen.  Once this was complete, a 5 mm trocar was inserted after the infiltration of local in the lateral right quadrant.  Through this the gallbladder was elevated cephalad and laterally and 2 additional trocars were placed.  A 12 mm port was placed in the

## 2024-05-19 NOTE — ED PROVIDER NOTES
CURRENT MEDICATIONS      Current Discharge Medication List        CONTINUE these medications which have NOT CHANGED    Details   ondansetron (ZOFRAN-ODT) 4 MG disintegrating tablet Take 1 tablet by mouth every 12 hours as needed for Nausea or Vomiting  Qty: 30 tablet, Refills: 0    Associated Diagnoses: Vertigo; Nausea      losartan (COZAAR) 100 MG tablet Take 1 tablet by mouth daily  Qty: 90 tablet, Refills: 0    Associated Diagnoses: Primary hypertension      meclizine (ANTIVERT) 25 MG tablet Take 1 tablet by mouth 2 times daily as needed for Dizziness  Qty: 60 tablet, Refills: 0    Associated Diagnoses: Vertigo; Nausea      amLODIPine (NORVASC) 5 MG tablet TAKE 1 TABLET BY MOUTH EVERY DAY *NEED NEW INSURANCE CARD*  Qty: 90 tablet, Refills: 1    Associated Diagnoses: Primary hypertension      potassium chloride 8 MEQ CPCR TAKE 2 CAPSULES BY MOUTH IN THE MORNING AND AT BEDTIME *NEED NEW CARD*  Qty: 360 capsule, Refills: 1    Associated Diagnoses: Primary hypertension; Hypokalemia      propranolol (INDERAL) 80 MG tablet TAKE 1 TABLET BY MOUTH EVERY DAY *NEED NEW CARD, OLD INSURANCE TERMINATED 08/31/23*  Qty: 90 tablet, Refills: 1    Associated Diagnoses: Primary hypertension      spironolactone (ALDACTONE) 25 MG tablet Take 1 tablet by mouth daily  Qty: 90 tablet, Refills: 1    Associated Diagnoses: Primary hypertension      cetirizine (ZYRTEC) 10 MG tablet Take 1 tablet by mouth daily  Qty: 90 tablet, Refills: 0    Associated Diagnoses: Nasal sinus congestion; Seasonal allergic rhinitis due to pollen      butalbital-APAP-caffeine (FIORICET) -40 MG CAPS per capsule Take 1 capsule by mouth every 4 hours as needed for Headaches  Qty: 20 capsule, Refills: 0      metFORMIN (GLUCOPHAGE) 850 MG tablet Take 1 tablet by mouth daily      azelastine (ASTELIN) 0.1 % nasal spray SPRAY 2 (TWO) SPRAYS INTO EACH NOSTRIL TWICE DAILY      vitamin D (CHOLECALCIFEROL) 25 MCG (1000 UT) TABS tablet Take 5 tablets by mouth

## 2024-05-19 NOTE — H&P
MD Mariela   butalbital-APAP-caffeine (FIORICET) -40 MG CAPS per capsule Take 1 capsule by mouth every 4 hours as needed for Headaches 2/26/24 5/3/24  BrandonJens MD   metFORMIN (GLUCOPHAGE) 850 MG tablet Take 1 tablet by mouth daily    Provider, MD Brenda   azelastine (ASTELIN) 0.1 % nasal spray SPRAY 2 (TWO) SPRAYS INTO EACH NOSTRIL TWICE DAILY 22   Automatic Reconciliation, Ar   vitamin D (CHOLECALCIFEROL) 25 MCG (1000 UT) TABS tablet Take 5 tablets by mouth daily    Automatic Reconciliation, Ar   fluticasone (FLONASE) 50 MCG/ACT nasal spray 2 sprays by Nasal route daily  Patient not taking: Reported on 2024    Automatic Reconciliation, Ar   ipratropium (ATROVENT HFA) 17 MCG/ACT inhaler TAKE 1 PUFF BY INHALATION EVERY SIX HOURS AS NEEDED FOR WHEEZING. 22   Automatic Reconciliation, Ar   mometasone (ASMANEX, 120 METERED DOSES,) 220 MCG/ACT AEPB inhaler Inhale 440 mcg into the lungs 2 times daily 10/14/21   Automatic Reconciliation, Ar         ALLERGIES:    Allergies   Allergen Reactions    Codeine Itching and Rash       Objective:   Blood pressure 108/61, pulse 68, temperature 98.1 °F (36.7 °C), temperature source Oral, resp. rate 16, height 1.753 m (5' 9\"), weight 79.9 kg (176 lb 2.4 oz), SpO2 99 %.  Temp (24hrs), Av.1 °F (36.7 °C), Min:98.1 °F (36.7 °C), Max:98.1 °F (36.7 °C)      Body mass index is 26.01 kg/m².    Physical Exam:    General: Well-appearing, no acute distress  HEENT: Extraocular muscles intact, trachea midline, normal range of motion  Lungs: Normal work of breathing, nontachypneic  Heart: Regular rate and rhythm  Abdomen: Soft, nondistended, mild tenderness to palpation in the right upper quadrant  Extremities: Warm, well-perfused  Neuro: Grossly intact  Psych: Appropriate      LABS:  Recent Labs     24  0755   WBC 7.9   HGB 13.6   HCT 40.8        Recent Labs     24  0755      K 3.7   *   CO2 25   BUN 13     Recent Labs

## 2024-05-20 ENCOUNTER — TELEPHONE (OUTPATIENT)
Age: 59
End: 2024-05-20

## 2024-05-20 VITALS
HEIGHT: 69 IN | DIASTOLIC BLOOD PRESSURE: 78 MMHG | SYSTOLIC BLOOD PRESSURE: 120 MMHG | HEART RATE: 58 BPM | BODY MASS INDEX: 26.09 KG/M2 | WEIGHT: 176.15 LBS | TEMPERATURE: 97.5 F | OXYGEN SATURATION: 97 % | RESPIRATION RATE: 18 BRPM

## 2024-05-20 DIAGNOSIS — R11.0 NAUSEA: ICD-10-CM

## 2024-05-20 DIAGNOSIS — R42 VERTIGO: ICD-10-CM

## 2024-05-20 LAB
ALBUMIN SERPL-MCNC: 3 G/DL (ref 3.5–5)
ALBUMIN/GLOB SERPL: 0.7 (ref 1.1–2.2)
ALP SERPL-CCNC: 89 U/L (ref 45–117)
ALT SERPL-CCNC: 39 U/L (ref 12–78)
ANION GAP SERPL CALC-SCNC: 5 MMOL/L (ref 5–15)
AST SERPL-CCNC: 26 U/L (ref 15–37)
BASOPHILS # BLD: 0 K/UL (ref 0–0.1)
BASOPHILS NFR BLD: 0 % (ref 0–1)
BILIRUB DIRECT SERPL-MCNC: 0.2 MG/DL (ref 0–0.2)
BILIRUB SERPL-MCNC: 0.9 MG/DL (ref 0.2–1)
BUN SERPL-MCNC: 11 MG/DL (ref 6–20)
BUN/CREAT SERPL: 19 (ref 12–20)
CALCIUM SERPL-MCNC: 9.9 MG/DL (ref 8.5–10.1)
CHLORIDE SERPL-SCNC: 109 MMOL/L (ref 97–108)
CO2 SERPL-SCNC: 23 MMOL/L (ref 21–32)
CREAT SERPL-MCNC: 0.58 MG/DL (ref 0.55–1.02)
DIFFERENTIAL METHOD BLD: ABNORMAL
EOSINOPHIL # BLD: 0 K/UL (ref 0–0.4)
EOSINOPHIL NFR BLD: 0 % (ref 0–7)
ERYTHROCYTE [DISTWIDTH] IN BLOOD BY AUTOMATED COUNT: 12.5 % (ref 11.5–14.5)
GLOBULIN SER CALC-MCNC: 4.1 G/DL (ref 2–4)
GLUCOSE SERPL-MCNC: 126 MG/DL (ref 65–100)
HCT VFR BLD AUTO: 36.8 % (ref 35–47)
HGB BLD-MCNC: 12.2 G/DL (ref 11.5–16)
IMM GRANULOCYTES # BLD AUTO: 0 K/UL (ref 0–0.04)
IMM GRANULOCYTES NFR BLD AUTO: 0 % (ref 0–0.5)
LYMPHOCYTES # BLD: 0.9 K/UL (ref 0.8–3.5)
LYMPHOCYTES NFR BLD: 11 % (ref 12–49)
MCH RBC QN AUTO: 29.7 PG (ref 26–34)
MCHC RBC AUTO-ENTMCNC: 33.2 G/DL (ref 30–36.5)
MCV RBC AUTO: 89.5 FL (ref 80–99)
MONOCYTES # BLD: 0.3 K/UL (ref 0–1)
MONOCYTES NFR BLD: 4 % (ref 5–13)
NEUTS SEG # BLD: 7.2 K/UL (ref 1.8–8)
NEUTS SEG NFR BLD: 85 % (ref 32–75)
NRBC # BLD: 0 K/UL (ref 0–0.01)
NRBC BLD-RTO: 0 PER 100 WBC
PLATELET # BLD AUTO: 192 K/UL (ref 150–400)
PMV BLD AUTO: 10.1 FL (ref 8.9–12.9)
POTASSIUM SERPL-SCNC: 3.8 MMOL/L (ref 3.5–5.1)
PROT SERPL-MCNC: 7.1 G/DL (ref 6.4–8.2)
RBC # BLD AUTO: 4.11 M/UL (ref 3.8–5.2)
SODIUM SERPL-SCNC: 137 MMOL/L (ref 136–145)
WBC # BLD AUTO: 8.5 K/UL (ref 3.6–11)

## 2024-05-20 PROCEDURE — 99024 POSTOP FOLLOW-UP VISIT: CPT | Performed by: NURSE PRACTITIONER

## 2024-05-20 PROCEDURE — 80076 HEPATIC FUNCTION PANEL: CPT

## 2024-05-20 PROCEDURE — 6370000000 HC RX 637 (ALT 250 FOR IP): Performed by: STUDENT IN AN ORGANIZED HEALTH CARE EDUCATION/TRAINING PROGRAM

## 2024-05-20 PROCEDURE — 85025 COMPLETE CBC W/AUTO DIFF WBC: CPT

## 2024-05-20 PROCEDURE — 80048 BASIC METABOLIC PNL TOTAL CA: CPT

## 2024-05-20 PROCEDURE — 36415 COLL VENOUS BLD VENIPUNCTURE: CPT

## 2024-05-20 PROCEDURE — G0378 HOSPITAL OBSERVATION PER HR: HCPCS

## 2024-05-20 RX ORDER — SCOLOPAMINE TRANSDERMAL SYSTEM 1 MG/1
1 PATCH, EXTENDED RELEASE TRANSDERMAL
Status: DISCONTINUED | OUTPATIENT
Start: 2024-05-20 | End: 2024-05-20 | Stop reason: HOSPADM

## 2024-05-20 RX ORDER — OXYCODONE HYDROCHLORIDE 5 MG/1
5 TABLET ORAL EVERY 6 HOURS PRN
Qty: 10 TABLET | Refills: 0 | Status: SHIPPED | OUTPATIENT
Start: 2024-05-20 | End: 2024-05-23

## 2024-05-20 RX ORDER — SCOLOPAMINE TRANSDERMAL SYSTEM 1 MG/1
1 PATCH, EXTENDED RELEASE TRANSDERMAL
Qty: 3 PATCH | Refills: 0 | Status: SHIPPED | OUTPATIENT
Start: 2024-05-20

## 2024-05-20 RX ORDER — TRAMADOL HYDROCHLORIDE 50 MG/1
50 TABLET ORAL EVERY 6 HOURS PRN
Qty: 12 TABLET | Refills: 0 | Status: SHIPPED | OUTPATIENT
Start: 2024-05-20 | End: 2024-05-23

## 2024-05-20 RX ADMIN — ACETAMINOPHEN 650 MG: 325 TABLET ORAL at 11:45

## 2024-05-20 RX ADMIN — ACETAMINOPHEN 650 MG: 325 TABLET ORAL at 05:50

## 2024-05-20 ASSESSMENT — PAIN DESCRIPTION - DESCRIPTORS: DESCRIPTORS: ACHING

## 2024-05-20 ASSESSMENT — PAIN DESCRIPTION - LOCATION: LOCATION: ABDOMEN

## 2024-05-20 ASSESSMENT — PAIN SCALES - GENERAL
PAINLEVEL_OUTOF10: 7
PAINLEVEL_OUTOF10: 5

## 2024-05-20 NOTE — PLAN OF CARE
Problem: Pain  Goal: Verbalizes/displays adequate comfort level or baseline comfort level  5/20/2024 0256 by Maico Kelly RN  Outcome: Progressing  5/19/2024 1941 by Ibis Hurd RN  Outcome: Progressing  5/19/2024 1940 by Ibis uHrd RN  Outcome: Progressing     Problem: Safety - Adult  Goal: Free from fall injury  Outcome: Progressing  Flowsheets (Taken 5/19/2024 1910)  Free From Fall Injury:   Instruct family/caregiver on patient safety   Based on caregiver fall risk screen, instruct family/caregiver to ask for assistance with transferring infant if caregiver noted to have fall risk factors     Problem: Discharge Planning  Goal: Discharge to home or other facility with appropriate resources  Outcome: Progressing  Flowsheets (Taken 5/19/2024 1910)  Discharge to home or other facility with appropriate resources: Identify barriers to discharge with patient and caregiver     Problem: ABCDS Injury Assessment  Goal: Absence of physical injury  Outcome: Progressing  Flowsheets (Taken 5/19/2024 1910)  Absence of Physical Injury: Implement safety measures based on patient assessment

## 2024-05-20 NOTE — PLAN OF CARE
Problem: Pain  Goal: Verbalizes/displays adequate comfort level or baseline comfort level  5/20/2024 1248 by Rafaela Armendariz RN  Outcome: Adequate for Discharge  5/20/2024 1131 by Rafaela Armendariz RN  Outcome: Progressing  5/20/2024 0256 by Maico Kelly RN  Outcome: Progressing     Problem: Safety - Adult  Goal: Free from fall injury  5/20/2024 1248 by Rafaela Armendariz RN  Outcome: Adequate for Discharge  5/20/2024 1131 by Rafaela Armendariz RN  Outcome: Progressing  5/20/2024 0256 by Maico Kelly RN  Outcome: Progressing  Flowsheets (Taken 5/19/2024 1910)  Free From Fall Injury:   Instruct family/caregiver on patient safety   Based on caregiver fall risk screen, instruct family/caregiver to ask for assistance with transferring infant if caregiver noted to have fall risk factors     Problem: Discharge Planning  Goal: Discharge to home or other facility with appropriate resources  5/20/2024 1248 by Rafaela Armendariz RN  Outcome: Adequate for Discharge  5/20/2024 1131 by Rafaela Armendariz RN  Outcome: Progressing  5/20/2024 0256 by Maico Kelly RN  Outcome: Progressing  Flowsheets (Taken 5/19/2024 1910)  Discharge to home or other facility with appropriate resources: Identify barriers to discharge with patient and caregiver     Problem: ABCDS Injury Assessment  Goal: Absence of physical injury  5/20/2024 1248 by Rafaela Armendariz RN  Outcome: Adequate for Discharge  5/20/2024 1131 by Rafaela Armendariz RN  Outcome: Progressing  5/20/2024 0256 by Maico Kelly RN  Outcome: Progressing  Flowsheets (Taken 5/19/2024 1910)  Absence of Physical Injury: Implement safety measures based on patient assessment

## 2024-05-20 NOTE — PROGRESS NOTES
End of Shift Note    Bedside shift change report given to RODRÍGUEZ Russo (oncoming nurse) by Maico Kelly RN (offgoing nurse).  Report included the following information SBAR, Kardex, ED Summary, OR Summary, Procedure Summary, Intake/Output, MAR, Accordion, Recent Results, and Med Rec Status    Shift worked:  7 PM - 7:30 AM     Shift summary and any significant changes:     Pain managable with scheduled Tylenol 650 mg PO q 6 hr.  Vital signs are stable.  Surgical sites no bleeding/ no hematoma noted.     Concerns for physician to address:       Zone phone for oncoming shift:          Activity:     Number times ambulated in hallways past shift: 3  Number of times OOB to chair past shift: 2    Cardiac:   Cardiac Monitoring: No           Access:  Current line(s): PIV     Genitourinary:   Urinary status: voiding    Respiratory:      Chronic home O2 use?: NO  Incentive spirometer at bedside: YES       GI:     Current diet:  Diet NPO  Passing flatus: YES  Tolerating current diet: YES       Pain Management:   Patient states pain is manageable on current regimen: YES    Skin:     Interventions: float heels, increase time out of bed, and PT/OT consult    Patient Safety:  Fall Score:    Interventions: bed/chair alarm, gripper socks, pt to call before getting OOB, and stay with me (per policy)       Length of Stay:  Expected LOS: 1  Actual LOS: 0      Maico Kelly RN

## 2024-05-20 NOTE — DISCHARGE SUMMARY
Post- Surgical Discharge Summary    Patient ID:  Jenni Pearce  [id number]  female  58 y.o.  1965    Admit date: 5/19/2024    Discharge date: 05/20/24     Admitting Physician: Shala Last MD     Date of Surgery:   5/19/2024     Preoperative Diagnosis:  Acute cholecystitis [K81.0]    Postoperative Diagnosis:   * No post-op diagnosis entered *    Procedure(s):  LAPAROSCOPIC CHOLECYSTECTOMY WITH GRAMS     Anesthesia Type:   General     Surgeon: Shala Last MD                            HPI:  Pt is a 58 y.o. female who has a history of Acute cholecystitis [K81.0] who presents at this time for a laparoscopic cholecystectomy.    Problem List:   Patient Active Problem List   Diagnosis    Radiculopathy of cervical spine    Migraine with vertigo    Cervicalgia    Vertigo    HTN (hypertension)    Bronchiectasis (HCC)    Insomnia    Chronic tension headaches    Hepatitis A    [place] syndrome    Anxiety    Hypokalemia    Acute cholecystitis        Hospital Course:  The patient underwent surgery. Intra-operative complications: None; patient tolerated the procedure well. Was taken to the PACU in stable condition and then transferred to the surgical floor.      Pathology is pending. Surgeon will follow results as outpatient.    Postoperative Pain Management:  Oxycodone     Postoperative transfusions:    Number of units banked PRBCs =   none     Post Op complications: None     Incisions  - clean, dry and intact. No significant erythema or swelling. Wound(s) appear to be healing without any evidence of infection.      Patient mobilized with nursing and was found to be safe and steady with ambulation.     Discharged to: Home     Condition on Discharge: Stable     Discharge instructions:    - Take pain medications as prescribed  - Diet regular  - Discharge activity:    - Activity as tolerated    - Ambulate several times a day   - No heavy lifting for 4 weeks   - Do not drive for two weeks or while on opioid

## 2024-05-20 NOTE — PROGRESS NOTES
Pt AOX4, able to make needs known. Abdominal incision dry, clean, and intact. Pt has order to discharge home. Discharge instructions given, pt verbalized understanding. IV removed, catheter intact. No redness or swelling noted. All belongings sent home with pt. Pt accompanied home by daughter.

## 2024-05-20 NOTE — PROGRESS NOTES
Bedside shift change report given to Maico Kelly RN (oncoming nurse) by Ibis Hurd RN  (offgoing nurse). Report included the following information Nurse Handoff Report, Index, ED Encounter Summary, ED SBAR, Adult Overview, Surgery Report, Intake/Output, MAR, Recent Results, Med Rec Status, Cardiac Rhythm N/A, and Alarm Parameters.

## 2024-05-20 NOTE — TELEPHONE ENCOUNTER
Patient called,   wanting to  a letter that Dr Manuel was suppose to have written for her    She will need [place] to go [place]     regarding her health condition    She will be leaving 5-27-24     Patient's phone  [phone]    Patient wanted you to know she had gallbladder surgery,  she was discharged today    This was at [facility]

## 2024-05-20 NOTE — PROGRESS NOTES
PCP hospital follow-up transitional care appointment has been scheduled with Dr. Mariela Manuel on 5/28/24 5889. [facility] placed [facility] information AVS for patient resource.   Pending patient discharge.  Felisha Coon, Care Management Assistant

## 2024-05-20 NOTE — PLAN OF CARE
Problem: Pain  Goal: Verbalizes/displays adequate comfort level or baseline comfort level  5/20/2024 1131 by Rafaela Armendariz RN  Outcome: Progressing  5/20/2024 0256 by Maico Kelly RN  Outcome: Progressing     Problem: Safety - Adult  Goal: Free from fall injury  5/20/2024 1131 by Rafaela Armendariz RN  Outcome: Progressing  5/20/2024 0256 by Maico Kelly RN  Outcome: Progressing  Flowsheets (Taken 5/19/2024 1910)  Free From Fall Injury:   Instruct family/caregiver on patient safety   Based on caregiver fall risk screen, instruct family/caregiver to ask for assistance with transferring infant if caregiver noted to have fall risk factors     Problem: Discharge Planning  Goal: Discharge to home or other facility with appropriate resources  5/20/2024 1131 by Rafaela Armendariz RN  Outcome: Progressing  5/20/2024 0256 by Maico Kelly RN  Outcome: Progressing  Flowsheets (Taken 5/19/2024 1910)  Discharge to home or other facility with appropriate resources: Identify barriers to discharge with patient and caregiver     Problem: ABCDS Injury Assessment  Goal: Absence of physical injury  5/20/2024 1131 by Rafaela Armendariz RN  Outcome: Progressing  5/20/2024 0256 by Maico Kelly RN  Outcome: Progressing  Flowsheets (Taken 5/19/2024 1910)  Absence of Physical Injury: Implement safety measures based on patient assessment

## 2024-05-20 NOTE — PROGRESS NOTES
Pt called and stated she was discharged home with a rx for oxycodone and has an allergy to codeine, perfect serve message sent to Liliana Obrien NP and Dr. Last,

## 2024-05-21 ENCOUNTER — TELEPHONE (OUTPATIENT)
Age: 59
End: 2024-05-21

## 2024-05-21 RX ORDER — MECLIZINE HYDROCHLORIDE 25 MG/1
25 TABLET ORAL 2 TIMES DAILY PRN
Qty: 60 TABLET | Refills: 0 | OUTPATIENT
Start: 2024-05-21 | End: 2024-06-20

## 2024-05-21 NOTE — TELEPHONE ENCOUNTER
Pt was sent in an rx for codine and she said she is allergic to it. Wants another rx sent into the pharmacy [phone]

## 2024-05-21 NOTE — TELEPHONE ENCOUNTER
Spoke with patient, informed her NP Liliana Obrien sent over a prescription for tramadol on 05/20/2024 and she needs to pick it up and return the oxycodone. Stated her son will pick med up tomorrow 05/22/24. Express understanding.

## 2024-05-29 ENCOUNTER — TELEPHONE (OUTPATIENT)
Age: 59
End: 2024-05-29

## 2024-05-29 NOTE — TELEPHONE ENCOUNTER
Pt called in to see if she can get a letter for the airport stating she just had surgery and she can't lift heavy items. [phone]

## 2025-02-24 NOTE — TELEPHONE ENCOUNTER
Gama RICHARD initiated through Cover my meds key # [id number] - [place] Case ID: [id number]    Approvedtoday  Request Reference Number: [id number]. Estelleella Records 225/1.5 is approved through 08/26/2022.  For further questions, call [employer] at [phone]    [facility] with pt requesting which pharmacy she would like [place] to be sent independent

## (undated) DEVICE — SYRINGE MED 10ML LUERLOCK TIP W/O SFTY DISP

## (undated) DEVICE — MANIFOLD REPROC SUCT 4 PRT F/NEPTUNE 2 WST MGMT SYS

## (undated) DEVICE — SOLUTION IV 500ML 0.9% SOD CHL PH 5 INJ USP VIAFLX PLAS

## (undated) DEVICE — TROCAR: Brand: KII FIOS FIRST ENTRY

## (undated) DEVICE — SET TUBE INSUFFLATION HI FLOW PNEUMOCLEAR

## (undated) DEVICE — Device

## (undated) DEVICE — SOLUTION IV 1000ML 0.9% SOD CHL PH 5 INJ USP VIAFLX PLAS

## (undated) DEVICE — SYRINGE MED 20ML STD CLR PLAS LUERLOCK TIP N CTRL DISP

## (undated) DEVICE — SYSTEM SMK EVAC LAP TBNG FILTER HSNG BENT STYL PNK SEE CLR

## (undated) DEVICE — GLOVE SURG SZ 7 L12IN FNGR THK79MIL GRN LTX FREE

## (undated) DEVICE — C-ARM: Brand: UNBRANDED

## (undated) DEVICE — SUTURE VICRYL + SZ 4-0 L27IN ABSRB UD PS-2 3/8 CIR REV CUT VCP426H

## (undated) DEVICE — NEEDLE INSUFFLATION 14 GAX120 MM SURGINEEDLE

## (undated) DEVICE — SUTURE VICRYL SZ 1 L36IN ABSRB UD L36MM CT-1 1/2 CIR J947H

## (undated) DEVICE — TROCAR: Brand: KII SLEEVE

## (undated) DEVICE — SPONGE GZ W4XL4IN COT 12 PLY TYP VII WVN C FLD DSGN STERILE

## (undated) DEVICE — GENERAL LAPAROSCOPY-MRMC: Brand: MEDLINE INDUSTRIES, INC.

## (undated) DEVICE — GLOVE SURG SZ 7 CRM LTX FREE POLYISOPRENE POLYMER BEAD ANTI

## (undated) DEVICE — COVER,MAYO STAND,XL,STERILE: Brand: MEDLINE

## (undated) DEVICE — APPLIER CLP M/L SHFT DIA5MM 15 LIG LIGAMAX 5

## (undated) DEVICE — LIQUIBAND RAPID ADHESIVE 36/CS 0.8ML: Brand: MEDLINE

## (undated) DEVICE — 3M™ IOBAN™ 2 ANTIMICROBIAL INCISE DRAPE 6650EZ: Brand: IOBAN™ 2

## (undated) DEVICE — DECANTER BAG 9": Brand: MEDLINE INDUSTRIES, INC.

## (undated) DEVICE — ELECTRODE ES 36CM LAP FLAT L HK COAT DISP CLEANCOAT

## (undated) DEVICE — HYPODERMIC SAFETY NEEDLE: Brand: MAGELLAN

## (undated) DEVICE — OPEN-END URETERAL CATHETER: Brand: COOK